# Patient Record
Sex: FEMALE
[De-identification: names, ages, dates, MRNs, and addresses within clinical notes are randomized per-mention and may not be internally consistent; named-entity substitution may affect disease eponyms.]

---

## 2018-01-30 ENCOUNTER — HOSPITAL ENCOUNTER (OUTPATIENT)
Dept: HOSPITAL 75 - RAD | Age: 58
End: 2018-01-30
Attending: NURSE PRACTITIONER
Payer: COMMERCIAL

## 2018-01-30 DIAGNOSIS — I73.9: Primary | ICD-10-CM

## 2018-01-30 PROCEDURE — 93922 UPR/L XTREMITY ART 2 LEVELS: CPT

## 2018-01-30 NOTE — DIAGNOSTIC IMAGING REPORT
INDICATION: Peripheral vascular disease.



FINDINGS: The right ankle-brachial index was 1.08. Left

ankle-brachial index was 1.18.



IMPRESSION: Normal bilateral ankle-brachial indices.



Dictated by: 



  Dictated on workstation # [id number]

## 2019-01-10 ENCOUNTER — HOSPITAL ENCOUNTER (EMERGENCY)
Dept: HOSPITAL 75 - ER | Age: 59
Discharge: HOME | End: 2019-01-10
Payer: COMMERCIAL

## 2019-01-10 VITALS — DIASTOLIC BLOOD PRESSURE: 97 MMHG | SYSTOLIC BLOOD PRESSURE: 151 MMHG

## 2019-01-10 VITALS — WEIGHT: 159 LBS | BODY MASS INDEX: 28.17 KG/M2 | HEIGHT: 63 IN

## 2019-01-10 DIAGNOSIS — N39.0: ICD-10-CM

## 2019-01-10 DIAGNOSIS — I10: ICD-10-CM

## 2019-01-10 DIAGNOSIS — L03.116: ICD-10-CM

## 2019-01-10 DIAGNOSIS — I73.9: ICD-10-CM

## 2019-01-10 DIAGNOSIS — Z98.890: ICD-10-CM

## 2019-01-10 DIAGNOSIS — F17.210: ICD-10-CM

## 2019-01-10 DIAGNOSIS — L03.115: Primary | ICD-10-CM

## 2019-01-10 DIAGNOSIS — M06.9: ICD-10-CM

## 2019-01-10 DIAGNOSIS — F41.9: ICD-10-CM

## 2019-01-10 DIAGNOSIS — F19.10: ICD-10-CM

## 2019-01-10 DIAGNOSIS — Z88.5: ICD-10-CM

## 2019-01-10 LAB
ALBUMIN SERPL-MCNC: 3.7 GM/DL (ref 3.2–4.5)
ALP SERPL-CCNC: 151 U/L (ref 40–136)
ALT SERPL-CCNC: 43 U/L (ref 0–55)
APTT BLD: 24 SEC (ref 24–35)
APTT PPP: (no result) S
BACTERIA #/AREA URNS HPF: (no result) /HPF
BARBITURATES UR QL: NEGATIVE
BASOPHILS # BLD AUTO: 0 10^3/UL (ref 0–0.1)
BASOPHILS NFR BLD AUTO: 0 % (ref 0–10)
BENZODIAZ UR QL SCN: NEGATIVE
BILIRUB SERPL-MCNC: 0.7 MG/DL (ref 0.1–1)
BILIRUB UR QL STRIP: NEGATIVE
BUN/CREAT SERPL: 14
CALCIUM SERPL-MCNC: 9.2 MG/DL (ref 8.5–10.1)
CAOX CRY #/AREA URNS LPF: (no result) /LPF
CHLORIDE SERPL-SCNC: 107 MMOL/L (ref 98–107)
CO2 SERPL-SCNC: 21 MMOL/L (ref 21–32)
COCAINE UR QL: NEGATIVE
CREAT SERPL-MCNC: 0.73 MG/DL (ref 0.6–1.3)
EOSINOPHIL # BLD AUTO: 0.1 10^3/UL (ref 0–0.3)
EOSINOPHIL NFR BLD AUTO: 2 % (ref 0–10)
ERYTHROCYTE [DISTWIDTH] IN BLOOD BY AUTOMATED COUNT: 13.3 % (ref 10–14.5)
ERYTHROCYTE [SEDIMENTATION RATE] IN BLOOD: 13 MM/HR (ref 0–30)
FIBRINOGEN PPP-MCNC: (no result) MG/DL
GFR SERPLBLD BASED ON 1.73 SQ M-ARVRAT: > 60 ML/MIN
GLUCOSE SERPL-MCNC: 139 MG/DL (ref 70–105)
GLUCOSE UR STRIP-MCNC: NEGATIVE MG/DL
HCT VFR BLD CALC: 38 % (ref 35–52)
HGB BLD-MCNC: 13.1 G/DL (ref 11.5–16)
INR PPP: 1 (ref 0.8–1.4)
KETONES UR QL STRIP: NEGATIVE
LEUKOCYTE ESTERASE UR QL STRIP: (no result)
LYMPHOCYTES # BLD AUTO: 0.8 X 10^3 (ref 1–4)
LYMPHOCYTES NFR BLD AUTO: 23 % (ref 12–44)
MAGNESIUM SERPL-MCNC: 1.7 MG/DL (ref 1.8–2.4)
MANUAL DIFFERENTIAL PERFORMED BLD QL: NO
MCH RBC QN AUTO: 31 PG (ref 25–34)
MCHC RBC AUTO-ENTMCNC: 35 G/DL (ref 32–36)
MCV RBC AUTO: 89 FL (ref 80–99)
METHADONE UR QL SCN: NEGATIVE
METHAMPHETAMINE SCREEN URINE S: NEGATIVE
MONOCYTES # BLD AUTO: 0.2 X 10^3 (ref 0–1)
MONOCYTES NFR BLD AUTO: 6 % (ref 0–12)
NEUTROPHILS # BLD AUTO: 2.3 X 10^3 (ref 1.8–7.8)
NEUTROPHILS NFR BLD AUTO: 70 % (ref 42–75)
NITRITE UR QL STRIP: NEGATIVE
OPIATES UR QL SCN: NEGATIVE
OXYCODONE UR QL: NEGATIVE
PH UR STRIP: 6 [PH] (ref 5–9)
PLATELET # BLD: 144 10^3/UL (ref 130–400)
PMV BLD AUTO: 9.8 FL (ref 7.4–10.4)
POTASSIUM SERPL-SCNC: 3.8 MMOL/L (ref 3.6–5)
PROPOXYPH UR QL: NEGATIVE
PROT SERPL-MCNC: 6.7 GM/DL (ref 6.4–8.2)
PROT UR QL STRIP: (no result)
PROTHROMBIN TIME: 13.5 SEC (ref 12.2–14.7)
RBC # BLD AUTO: 4.23 10^6/UL (ref 4.35–5.85)
RBC #/AREA URNS HPF: (no result) /HPF
SODIUM SERPL-SCNC: 140 MMOL/L (ref 135–145)
SP GR UR STRIP: 1.02 (ref 1.02–1.02)
SQUAMOUS #/AREA URNS HPF: (no result) /HPF
TRICYCLICS UR QL SCN: NEGATIVE
TSH SERPL DL<=0.05 MIU/L-ACNC: 1.29 UIU/ML (ref 0.35–4.94)
UROBILINOGEN UR-MCNC: 4 MG/DL
WBC # BLD AUTO: 3.3 10^3/UL (ref 4.3–11)
WBC #/AREA URNS HPF: (no result) /HPF

## 2019-01-10 PROCEDURE — 80320 DRUG SCREEN QUANTALCOHOLS: CPT

## 2019-01-10 PROCEDURE — 84443 ASSAY THYROID STIM HORMONE: CPT

## 2019-01-10 PROCEDURE — 80053 COMPREHEN METABOLIC PANEL: CPT

## 2019-01-10 PROCEDURE — 93005 ELECTROCARDIOGRAM TRACING: CPT

## 2019-01-10 PROCEDURE — 85610 PROTHROMBIN TIME: CPT

## 2019-01-10 PROCEDURE — 36415 COLL VENOUS BLD VENIPUNCTURE: CPT

## 2019-01-10 PROCEDURE — 85025 COMPLETE CBC W/AUTO DIFF WBC: CPT

## 2019-01-10 PROCEDURE — 84484 ASSAY OF TROPONIN QUANT: CPT

## 2019-01-10 PROCEDURE — 85652 RBC SED RATE AUTOMATED: CPT

## 2019-01-10 PROCEDURE — 83735 ASSAY OF MAGNESIUM: CPT

## 2019-01-10 PROCEDURE — 86141 C-REACTIVE PROTEIN HS: CPT

## 2019-01-10 PROCEDURE — 80306 DRUG TEST PRSMV INSTRMNT: CPT

## 2019-01-10 PROCEDURE — 83880 ASSAY OF NATRIURETIC PEPTIDE: CPT

## 2019-01-10 PROCEDURE — 71046 X-RAY EXAM CHEST 2 VIEWS: CPT

## 2019-01-10 PROCEDURE — 87088 URINE BACTERIA CULTURE: CPT

## 2019-01-10 PROCEDURE — 96372 THER/PROPH/DIAG INJ SC/IM: CPT

## 2019-01-10 PROCEDURE — 93041 RHYTHM ECG TRACING: CPT

## 2019-01-10 PROCEDURE — 81000 URINALYSIS NONAUTO W/SCOPE: CPT

## 2019-01-10 PROCEDURE — 85730 THROMBOPLASTIN TIME PARTIAL: CPT
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
## 2019-01-10 NOTE — XMS REPORT
Continuity of Care Document

 Created on: 01/10/2019



Cindy Negrete

External Reference #: [id number]

: 1960

Sex: Female



Demographics







 Preferred Language  Unknown

 

 Marital Status  Unknown

 

 Confucianist Affiliation  Unknown

 

 Race  Unknown

 

 Ethnic Group  Unknown





Author







 Author  [facility]

 

 Organization  [facility]

 

 Address  Unknown

 

 Phone  Unavailable



              



Allergies

      





 Active            Description            Code            Type            
Severity            Reaction            Onset            Reported/Identified   
         Relationship to Patient            Clinical Status        

 

 Yes            amphetamine                         Drug Allergy            N/A
            N/A                         2014                             
     

 

 Yes            Benzodiazepines                         Drug Allergy            
N/A            N/A                         2014                          
        

 

 Yes            hydrocodone                         Drug Allergy            N/A
            N/A                         2014                             
     



                      



Medications

      



There is no data.                  



Problems

      





 Date Dx Coded            Attending            Type            Code            
Diagnosis            Diagnosed By        

 

 2012            GALEN THOMPSON DO                         070.70         
   UNSPECIFIED VIRAL HEPATITIS C WITHOUT HEPATIC COMA                     

 

 2012            GALEN THOMPSON DO                         078.10         
   VIRAL WARTS UNSPECIFIED                     

 

 2012            GALEN THOMPSON DO                         401.1          
  HYPERTENSION, BENIGN ESSENTIAL                     

 

 2012            GALEN THOMPSON DO                         716.90         
   ARTHRITIS/ ARTHROPATHY, UNSPECIFIED                     

 

 2012            GALEN THOMPSON DO                         V70.0          
  ROUTINE GENERAL MEDICAL EXAMINATION AT A HEALTH CARE FACILITY                
     

 

 2012            GALEN THOMPSON DO                         V76.10         
   BREAST CANCER SCREENING                     

 

 2012            GALEN THOMPSON DO                         V76.2          
  CERVICAL CANCER SCREENING (PAP SMEAR)                     

 

 2012            RACHAEL DOAN PA-C                         070.70    
        UNSPECIFIED VIRAL HEPATITIS C WITHOUT HEPATIC COMA                     

 

 2012            RACHAEL DOAN PA-C                         078.10    
        VIRAL WARTS UNSPECIFIED                     

 

 2012            RACHAEL DOAN PA-C                         401.1     
       HYPERTENSION, BENIGN ESSENTIAL                     

 

 2012            RACHAEL DOAN PA-C                         716.90    
        ARTHRITIS/ ARTHROPATHY, UNSPECIFIED                     

 

 2012            RACHAEL DOAN PA-C                         V70.0     
       ROUTINE GENERAL MEDICAL EXAMINATION AT A HEALTH CARE FACILITY           
          

 

 2012            RACHAEL DOAN PA-C                         V76.10    
        BREAST CANCER SCREENING                     

 

 2012            RACHAEL DOAN PA-C                         V76.2     
       CERVICAL CANCER SCREENING (PAP SMEAR)                     

 

 2012                                      070.70            HEPATITIS, C 
VIRUS                     

 

 2012                                      078.10            VIRAL WARTS 
UNSPECIFIED                     

 

 2012                                      401.1            ESSENTIAL 
HYPERTENSION BENIGN                     

 

 2012                                      716.90            ARTHRITIS/ 
ARTHROPATHY, UNSPECIFIED                     

 

 2012                                      V70.0            ROUTINE 
GENERAL MEDICAL EXAMINATION AT A HEALTH CARE FACILITY                     

 

 2012                                      V76.10            BREAST 
CANCER SCREENING                     

 

 2012                                      V76.2            CERVICAL 
CANCER SCREENING (PAP SMEAR)                     

 

 2012                                      070.70            HEPATITIS, C 
VIRUS                     

 

 2012                                      078.10            VIRAL WARTS 
UNSPECIFIED                     

 

 2012                                      401.1            ESSENTIAL 
HYPERTENSION BENIGN                     

 

 2012                                      716.90            ARTHRITIS/ 
ARTHROPATHY, UNSPECIFIED                     

 

 2012                                      V70.0            ROUTINE 
GENERAL MEDICAL EXAMINATION AT A HEALTH CARE FACILITY                     

 

 2012                                      V76.10            BREAST 
CANCER SCREENING                     

 

 2012                                      V76.2            CERVICAL 
CANCER SCREENING (PAP SMEAR)                     

 

 2012                                      070.70            HEPATITIS, C 
VIRUS                     

 

 2012                                      078.10            VIRAL WARTS 
UNSPECIFIED                     

 

 2012                                      401.1            ESSENTIAL 
HYPERTENSION BENIGN                     

 

 2012                                      716.90            ARTHRITIS/ 
ARTHROPATHY, UNSPECIFIED                     

 

 2012                                      V70.0            ROUTINE 
GENERAL MEDICAL EXAMINATION AT A HEALTH CARE FACILITY                     

 

 2012                                      V76.10            BREAST 
CANCER SCREENING                     

 

 2012                                      V76.2            CERVICAL 
CANCER SCREENING (PAP SMEAR)                     

 

 2012                                      070.70            HEPATITIS, C 
VIRUS                     

 

 2012                                      078.10            VIRAL WARTS 
UNSPECIFIED                     

 

 2012                                      401.1            ESSENTIAL 
HYPERTENSION BENIGN                     

 

 2012                                      716.90            ARTHRITIS/ 
ARTHROPATHY, UNSPECIFIED                     

 

 2012                                      V70.0            ROUTINE 
GENERAL MEDICAL EXAMINATION AT A HEALTH CARE FACILITY                     

 

 2012                                      V76.10            BREAST 
CANCER SCREENING                     

 

 2012                                      V76.2            CERVICAL 
CANCER SCREENING (PAP SMEAR)                     

 

 2012                                      070.70            HEPATITIS, C 
VIRUS                     

 

 2012                                      078.10            VIRAL WARTS 
UNSPECIFIED                     

 

 2012                                      401.1            ESSENTIAL 
HYPERTENSION BENIGN                     

 

 2012                                      716.90            ARTHRITIS/ 
ARTHROPATHY, UNSPECIFIED                     

 

 2012                                      V70.0            ROUTINE 
GENERAL MEDICAL EXAMINATION AT A HEALTH CARE FACILITY                     

 

 2012                                      V76.10            BREAST 
CANCER SCREENING                     

 

 2012                                      V76.2            CERVICAL 
CANCER SCREENING (PAP SMEAR)                     

 

 2012                                      070.70            HEPATITIS, C 
VIRUS                     

 

 2012                                      078.10            VIRAL WARTS 
UNSPECIFIED                     

 

 2012                                      401.1            ESSENTIAL 
HYPERTENSION BENIGN                     

 

 2012                                      716.90            ARTHRITIS/ 
ARTHROPATHY, UNSPECIFIED                     

 

 2012                                      V70.0            ROUTINE 
GENERAL MEDICAL EXAMINATION AT A HEALTH CARE FACILITY                     

 

 2012                                      V76.10            BREAST 
CANCER SCREENING                     

 

 2012                                      V76.2            CERVICAL 
CANCER SCREENING (PAP SMEAR)                     

 

 2012                                      070.70            HEPATITIS, C 
VIRUS                     

 

 2012                                      078.10            VIRAL WARTS 
UNSPECIFIED                     

 

 2012                                      401.1            ESSENTIAL 
HYPERTENSION BENIGN                     

 

 2012                                      716.90            ARTHRITIS/ 
ARTHROPATHY, UNSPECIFIED                     

 

 2012                                      V70.0            ROUTINE 
GENERAL MEDICAL EXAMINATION AT A HEALTH CARE FACILITY                     

 

 2012                                      V76.10            BREAST 
CANCER SCREENING                     

 

 2012                                      V76.2            CERVICAL 
CANCER SCREENING (PAP SMEAR)                     

 

 2012                                      070.70            HEPATITIS, C 
VIRUS                     

 

 2012                                      078.10            VIRAL WARTS 
UNSPECIFIED                     

 

 2012                                      401.1            ESSENTIAL 
HYPERTENSION BENIGN                     

 

 2012                                      716.90            ARTHRITIS/ 
ARTHROPATHY, UNSPECIFIED                     

 

 2012                                      V70.0            ROUTINE 
GENERAL MEDICAL EXAMINATION AT A HEALTH CARE FACILITY                     

 

 2012                                      V76.10            BREAST 
CANCER SCREENING                     

 

 2012                                      V76.2            CERVICAL 
CANCER SCREENING (PAP SMEAR)                     

 

 2012            CARLINE DDS, MICHAEL ROMAN                         070.70    
        HEPATITIS, C VIRUS                     

 

 2012            CARLINE DDS, MICHAEL ROMAN                         078.10    
        VIRAL WARTS UNSPECIFIED                     

 

 2012            CARLINE DDS, MICHAEL ROMAN                         401.1     
       ESSENTIAL HYPERTENSION BENIGN                     

 

 2012            CARLINE DDS, MICHAEL ROMAN                         716.90    
        ARTHRITIS/ ARTHROPATHY, UNSPECIFIED                     

 

 2012            CARLINE DDS, MICHAEL ROMAN                         V70.0     
       ROUTINE GENERAL MEDICAL EXAMINATION AT A HEALTH CARE FACILITY           
          

 

 2012            CARLINE DDS, MICHAEL ROMAN                         V76.10    
        BREAST CANCER SCREENING                     

 

 2012            CARLINE DDS, MICHAEL ROMAN                         V76.2     
       CERVICAL CANCER SCREENING (PAP SMEAR)                     

 

 2012            CARLINE DDS, MICHAEL ROMAN                         070.70    
        HEPATITIS, C VIRUS                     

 

 2012            CARLINE DDS, MICHAEL ROMAN                         078.10    
        VIRAL WARTS UNSPECIFIED                     

 

 2012            CARLINE DDS, MICHAEL ROMAN                         401.1     
       ESSENTIAL HYPERTENSION BENIGN                     

 

 2012            CARLINE DDS, MICHAEL ROMAN                         716.90    
        ARTHRITIS/ ARTHROPATHY, UNSPECIFIED                     

 

 2012            MICHAEL CROWLEY DDS                         V70.0     
       ROUTINE GENERAL MEDICAL EXAMINATION AT A HEALTH CARE FACILITY           
          

 

 2012            MICHAEL CROWLEY DDS                         V76.10    
        BREAST CANCER SCREENING                     

 

 2012            MICHAEL CROWLEY DDS                         V76.2     
       CERVICAL CANCER SCREENING (PAP SMEAR)                     

 

 2012            GALEN THOMPSON DO                         070.70         
   HEPATITIS, C VIRUS                     

 

 2012            GALEN THOMPSON DO K                         078.10         
   VIRAL WARTS UNSPECIFIED                     

 

 2012            GALEN THOMPSON DO K                         401.1          
  ESSENTIAL HYPERTENSION BENIGN                     

 

 2012            GEENA THOMPSON DOA K                         716.90         
   ARTHRITIS/ ARTHROPATHY, UNSPECIFIED                     

 

 2012            GEENA THOMPSON DOA K                         V70.0          
  ROUTINE GENERAL MEDICAL EXAMINATION AT A HEALTH CARE FACILITY                
     

 

 2012            GALEN THOMPSON DO                         V76.10         
   BREAST CANCER SCREENING                     

 

 2012            GEENA THOMPSON DOA K                         V76.2          
  CERVICAL CANCER SCREENING (PAP SMEAR)                     

 

 2012            GALEN THOMPSON DO K                         070.70         
   HEPATITIS, C VIRUS                     

 

 2012            GALEN THOMPSON DO K                         078.10         
   VIRAL WARTS UNSPECIFIED                     

 

 2012            GEENA THOMPSON DOA K                         401.1          
  ESSENTIAL HYPERTENSION BENIGN                     

 

 2012            GEENA THOMPSON DOA K                         716.90         
   ARTHRITIS/ ARTHROPATHY, UNSPECIFIED                     

 

 2012            GEEAN THOMPSON DOA K                         V70.0          
  ROUTINE GENERAL MEDICAL EXAMINATION AT A HEALTH CARE FACILITY                
     

 

 2012            GEENA THOMPSON DOA K                         V76.10         
   BREAST CANCER SCREENING                     

 

 2012            GEENA THOMPSON DOA K                         V76.2          
  CERVICAL CANCER SCREENING (PAP SMEAR)                     

 

 2012            WHITE DDS, MAYURI J                         070.70     
       HEPATITIS, C VIRUS                     

 

 2012            WHITE DDS, MAYURI J                         078.10     
       VIRAL WARTS UNSPECIFIED                     

 

 2012            WHITE DDS, MAYURI J                         401.1      
      ESSENTIAL HYPERTENSION BENIGN                     

 

 2012            WHITE DDS, MAYURI J                         716.90     
       ARTHRITIS/ ARTHROPATHY, UNSPECIFIED                     

 

 2012            WHITE DDS, MAYURI J                         V70.0      
      ROUTINE GENERAL MEDICAL EXAMINATION AT A HEALTH CARE FACILITY            
         

 

 2012            WHITE DDS, MAYURI J                         V76.10     
       BREAST CANCER SCREENING                     

 

 2012            WHITE DDS, MAYURI J                         V76.2      
      CERVICAL CANCER SCREENING (PAP SMEAR)                     

 

 2012            THOMPSON DO, GALEN K                         070.70         
   HEPATITIS, C VIRUS                     

 

 2012            GALEN THOMPSON DO                         078.10         
   VIRAL WARTS UNSPECIFIED                     

 

 2012            GALEN THOMPSON DO                         401.1          
  ESSENTIAL HYPERTENSION BENIGN                     

 

 2012            GALEN THOMPSON DO                         716.90         
   ARTHRITIS/ ARTHROPATHY, UNSPECIFIED                     

 

 2012            GALEN THOMPSON DO                         V70.0          
  ROUTINE GENERAL MEDICAL EXAMINATION AT A HEALTH CARE FACILITY                
     

 

 2012            GALEN THOMPSON DO                         V76.10         
   BREAST CANCER SCREENING                     

 

 2012            GALEN THOMPSON DO                         V76.2          
  CERVICAL CANCER SCREENING (PAP SMEAR)                     

 

 2012            GALEN THOMPSON DO                         070.70         
   UNSPECIFIED VIRAL HEPATITIS C WITHOUT HEPATIC COMA                     

 

 2012            GALEN THOMPSON DO                         078.10         
   VIRAL WARTS UNSPECIFIED                     

 

 2012            GALEN THOMPSON DO                         401.1          
  HYPERTENSION, BENIGN ESSENTIAL                     

 

 2012            GALEN TOHMPSON DO                         716.90         
   ARTHRITIS/ ARTHROPATHY, UNSPECIFIED                     

 

 2012            GALEN THOMPSON DO                         V70.0          
  ROUTINE GENERAL MEDICAL EXAMINATION AT A HEALTH CARE FACILITY                
     

 

 2012            GALEN THOMPSON DO                         V76.10         
   BREAST CANCER SCREENING                     

 

 2012            GALEN THOMPSON DO                         V76.2          
  CERVICAL CANCER SCREENING (PAP SMEAR)                     

 

 10/22/2012            GALEN THOMPSON DO                         443.0          
  RAYNAUD'S SYNDROME                     

 

 10/22/2012            GALEN THOMPSON DO                         786.50         
   CHEST PAIN                     

 

 10/22/2012            RACHAEL DOAN PA-C                         443.0     
       RAYNAUD'S SYNDROME                     

 

 10/22/2012            RACHAEL DOAN PA-C                         786.50    
        CHEST PAIN                     

 

 10/22/2012                                      443.0            RAYNAUD'S 
SYNDROME                     

 

 10/22/2012                                      786.50            CHEST PAIN  
                   

 

 10/22/2012                                      443.0            RAYNAUD'S 
SYNDROME                     

 

 10/22/2012                                      786.50            CHEST PAIN  
                   

 

 10/22/2012                                      443.0            RAYNAUD'S 
SYNDROME                     

 

 10/22/2012                                      786.50            CHEST PAIN  
                   

 

 10/22/2012                                      443.0            RAYNAUD'S 
SYNDROME                     

 

 10/22/2012                                      786.50            CHEST PAIN  
                   

 

 10/22/2012                                      443.0            RAYNAUD'S 
SYNDROME                     

 

 10/22/2012                                      786.50            CHEST PAIN  
                   

 

 10/22/2012                                      443.0            RAYNAUD'S 
SYNDROME                     

 

 10/22/2012                                      786.50            CHEST PAIN  
                   

 

 10/22/2012                                      443.0            RAYNAUD'S 
SYNDROME                     

 

 10/22/2012                                      786.50            CHEST PAIN  
                   

 

 10/22/2012                                      443.0            RAYNAUD'S 
SYNDROME                     

 

 10/22/2012                                      786.50            CHEST PAIN  
                   

 

 10/22/2012            CARLINE DDS, MICHAEL ROMAN                         443.0     
       RAYNAUD'S SYNDROME                     

 

 10/22/2012            CARLINE DDS, MICHAEL ROMAN                         786.50    
        CHEST PAIN                     

 

 10/22/2012            CARLINE DDS, MICHAEL ROMAN                         443.0     
       RAYNAUD'S SYNDROME                     

 

 10/22/2012            CARLINE DDS, MICHAEL ROMAN                         786.50    
        CHEST PAIN                     

 

 10/22/2012            THOMPSON DO, GALEN K                         443.0          
  RAYNAUD'S SYNDROME                     

 

 10/22/2012            THOMPSON DO, GALEN K                         786.50         
   CHEST PAIN                     

 

 10/22/2012            THOMPSON DO, GALEN K                         443.0          
  RAYNAUD'S SYNDROME                     

 

 10/22/2012            THOMPSON DO, GALEN K                         786.50         
   CHEST PAIN                     

 

 10/22/2012            WHITE DDS, MAYURI J                         443.0      
      RAYNAUD'S SYNDROME                     

 

 10/22/2012            WHITE DDS, MAYURI J                         786.50     
       CHEST PAIN                     

 

 10/22/2012            THOMPSON DO, GALEN K                         443.0          
  RAYNAUD'S SYNDROME                     

 

 10/22/2012            THOMPSON DO, GALEN K                         786.50         
   CHEST PAIN                     

 

 10/22/2012            THOMPSON DO, GALEN K                         443.0          
  RAYNAUD'S SYNDROME                     

 

 10/22/2012            THOMPSON DO, GALEN K                         786.50         
   CHEST PAIN                     

 

 2012            THOMPSON DO, GALEN K                         714.0          
  RHEUMATOID ARTHRITIS                     

 

 2012            RACHAEL DOAN PA-C                         714.0     
       RHEUMATOID ARTHRITIS                     

 

 2012                                      714.0            RHEUMATOID 
ARTHRITIS                     

 

 2012                                      714.0            RHEUMATOID 
ARTHRITIS                     

 

 2012                                      714.0            RHEUMATOID 
ARTHRITIS                     

 

 2012                                      714.0            RHEUMATOID 
ARTHRITIS                     

 

 2012                                      714.0            RHEUMATOID 
ARTHRITIS                     

 

 2012                                      714.0            RHEUMATOID 
ARTHRITIS                     

 

 2012                                      714.0            RHEUMATOID 
ARTHRITIS                     

 

 2012                                      714.0            RHEUMATOID 
ARTHRITIS                     

 

 2012            CARLINE DDS, MICHAEL ROMAN                         714.0     
       RHEUMATOID ARTHRITIS                     

 

 2012            CARLINE HYLTONS, MICHAEL ROMAN                         714.0     
       RHEUMATOID ARTHRITIS                     

 

 2012            THOMPSON DO GALEN K                         714.0          
  RHEUMATOID ARTHRITIS                     

 

 2012            THOMPSON  GALEN YOON                         714.0          
  RHEUMATOID ARTHRITIS                     

 

 2012            WHITE CONCETTAS, MAYURI J                         714.0      
      RHEUMATOID ARTHRITIS                     

 

 2012            JAY HUTCHISON GALEN K                         714.0          
  RHEUMATOID ARTHRITIS                     

 

 2012            THOMPSON DO GALEN K                         714.0          
  RHEUMATOID ARTHRITIS                     

 

 2013            RACHAEL DOAN PA-C                         795.51    
        POSITIVE TB SKIN TEST                     

 

 2013                                      795.51            POSITIVE TB 
SKIN TEST                     

 

 2013                                      795.51            POSITIVE TB 
SKIN TEST                     

 

 2013                                      795.51            POSITIVE TB 
SKIN TEST                     

 

 2013                                      795.51            POSITIVE TB 
SKIN TEST                     

 

 2013                                      795.51            POSITIVE TB 
SKIN TEST                     

 

 2013                                      795.51            POSITIVE TB 
SKIN TEST                     

 

 2013                                      795.51            POSITIVE TB 
SKIN TEST                     

 

 2013                                      795.51            POSITIVE TB 
SKIN TEST                     

 

 2013            CARLINE CRUZ, MICHAEL ROMAN                         795.51    
        POSITIVE TB SKIN TEST                     

 

 2013            CARLINE HYLTONS, MICHAEL ROMAN                         795.51    
        POSITIVE TB SKIN TEST                     

 

 2013            GALEN THOMPSON DO                         795.51         
   POSITIVE TB SKIN TEST                     

 

 2013            THOMPSON GALEN HUTCHISON                         795.51         
   POSITIVE TB SKIN TEST                     

 

 2013            SHAHRAM HYLTONS, MAYURI BARRERA                         795.51     
       POSITIVE TB SKIN TEST                     

 

 2013            GALEN THOMPSON DO                         795.51         
   POSITIVE TB SKIN TEST                     

 

 2013                                      V05.3            [place] DX   
                  

 

 2013                                      V05.3            [place] DX   
                  

 

 2013                                      V05.3            [place] DX   
                  

 

 2013                                      V05.3            [place] DX   
                  

 

 2013                                      V05.3            [place] DX   
                  

 

 2013                                      V05.3            [place] DX   
                  

 

 2013                                      V05.3            [place] DX   
                  

 

 2013                                      V05.3            [place] DX   
                  

 

 2013            CARLINE CRUZ, MICHAEL ROMAN                         V05.3     
       [place] DX                     

 

 2013            CARLINE CRUZ, MICHAEL ROMAN                         V05.3     
       [place] DX                     

 

 2013            GALEN THOMPSON DO                         V05.3          
  [place] DX                     

 

 2013            GALEN THOMPSON DO                         V05.3          
  [place] DX                     

 

 2013            MAYURI OMALLEY DDS                         V05.3      
      [place] DX                     

 

 2013            GALEN THOMPSON DO                         V05.3          
  [place] DX                     

 

 2013                                      309.28            AD ADJ D/O W 
ANX DEP MOOD                     

 

 2013                                      309.28            AD ADJ D/O W 
ANX DEP MOOD                     

 

 2013                                      309.28            AD ADJ D/O W 
ANX DEP MOOD                     

 

 2013                                      309.28            AD ADJ D/O W 
ANX DEP MOOD                     

 

 2013                                      309.28            AD ADJ D/O W 
ANX DEP MOOD                     

 

 2013                                      309.28            AD ADJ D/O W 
ANX DEP MOOD                     

 

 2013                                      309.28            AD ADJ D/O W 
ANX DEP MOOD                     

 

 2013            MICHAEL CROWLEY DDS                         309.28    
        AD ADJ D/O W ANX DEP MOOD                     

 

 2013            MICHAEL CROWLEY DDS                         309.28    
        AD ADJ D/O W ANX DEP MOOD                     

 

 2013            GALEN THOMPSON DO                         309.28         
   AD ADJ D/O W ANX DEP MOOD                     

 

 2013            GALEN THOMPSON DO                         309.28         
   AD ADJ D/O W ANX DEP MOOD                     

 

 2013            MAYURI OMALLEY DDS                         309.28     
       AD ADJ D/O W ANX DEP MOOD                     

 

 2013            GALEN THOMPSON DO                         309.28         
   AD ADJ D/O W ANX DEP MOOD                     

 

 2013            MICHAEL CROWLEY DDS                         E870.3    
        ACCIDENT - CUT/PUNCTURE/PERF DRUING INJECTION                     

 

 2013            MICHAEL CROWLEY DDS                         E870.3    
        ACCIDENT - CUT/PUNCTURE/PERF DRUING INJECTION                     

 

 2013            GALEN THOMPSON DO                         E870.3         
   ACCIDENT - CUT/PUNCTURE/PERF DRUING INJECTION                     

 

 2013            GALNE THOMPSON DO                         E870.3         
   ACCIDENT - CUT/PUNCTURE/PERF DRUING INJECTION                     

 

 2013            MAYURI OMALLEY DDS                         E870.3     
       ACCIDENT - CUT/PUNCTURE/PERF DRUING INJECTION                     

 

 2013            GALEN THOMPSON DO                         E870.3         
   ACCIDENT - CUT/PUNCTURE/PERF DRUING INJECTION                     

 

 2014            GALEN THOMPSON DO                         625.8          
  OTHER SPECIFIED SYMPTOMS ASSOCIATED WITH FEMALE GENITAL ORGANS               
      

 

 2014            GALEN THOMPSON DO                         625.8          
  OTHER SPECIFIED SYMPTOMS ASSOCIATED WITH FEMALE GENITAL ORGANS               
      

 

 2014            MAYURI OMALLEY DDS                         625.8      
      OTHER SPECIFIED SYMPTOMS ASSOCIATED WITH FEMALE GENITAL ORGANS           
          

 

 2014            GALEN THOMPSON DO                         625.8          
  OTHER SPECIFIED SYMPTOMS ASSOCIATED WITH FEMALE GENITAL ORGANS               
      

 

 2014            GALEN THOMPSON DO                         305.70         
   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 
USE                     

 

 2014            GALEN THOMPSON DO                         719.40         
   PAIN IN JOINT SITE UNSPECIFIED                     

 

 2014            WHITE DDSMAYURI                         305.70     
       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 
UNSPECIFIED USE                     

 

 2014            WHITE DDSMAYURI                         719.40     
       PAIN IN JOINT SITE UNSPECIFIED                     

 

 2014            GALEN THOMPSON DO K                         305.70         
   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 
USE                     

 

 2014            GALEN THOMPSON DO                         719.40         
   PAIN IN JOINT SITE UNSPECIFIED                     

 

 2014            GALEN THOMPSON DO                         456.8          
  VARICES OF OTHER SITES                     

 

 2014            GALEN THOMPSON DO                         708.9          
  UNSPECIFIED URTICARIA                     

 

 2018            RACHAEL FITZGERALD            Ot            V76.12     
       OTH SCREEN MAMMO-MALIGN NEOPLASM OF TRISTAN                     

 

 2018            MADARON CLARISSA ARON ARNP            Ot            I73.9      
      PERIPHERAL VASCULAR DISEASE, UNSPECIFIED                     

 

 2018            JAY DOBSONA ARON ARNP            Ot            I73.9      
      PERIPHERAL VASCULAR DISEASE, UNSPECIFIED                     

 

 2018            RACHAEL FITZGERALD            Ot            V76.12     
       OTH SCREEN MAMMO-MALIGN NEOPLASM OF TRISTAN                     

 

 2018            JAY DOBSONA ARON ARNP            Ot            I73.9      
      PERIPHERAL VASCULAR DISEASE, UNSPECIFIED                     

 

 2018            RACHAEL FITZGERALD            Ot            V76.12     
       OTH SCREEN MAMMO-MALIGN NEOPLASM OF TRISTAN                     

 

 2018            JAY DOBSONA ARON ARNP            Ot            I73.9      
      PERIPHERAL VASCULAR DISEASE, UNSPECIFIED                     



                                                                               
                                                                               
                                                                               
                                                                               
                                                                               
                                                                               
                



Procedures

      





 Code            Description            Performed By            Performed On   
     

 

             [id number]                                  ROUTINE VENIPUNCTURE       
                            2012        

 

             33315                                  EKG, TRACING               
                    2012        

 

             36081                                  CMP                        
           2012        

 

             50410                                  LIVER PANEL (LFT)          
                         2012        

 

             23194                                  URINE DRUG SCREEN  (IN-HOUSE
)                                   2012        

 

             03517                                  BNP                        
           2012        

 

             75851                                  CBC                        
           2012        

 

             09037                                  PT/INR                     
              2012        

 

             22985                                  CRP HS (CARDIO)            
                       2012        

 

             90371                                  RA FACTOR                  
                 2012        

 

             57930                                  [facility]              
                     2012        

 

             77316                                  HEP B CORE ANTIBODY, IGM   
                                2012        

 

             39679                                  HEP A ANTIBODY, IGM (RML)  
                                 2012        

 

             14622                                  HEP B SURFACE ANTIGEN (STATE
)                                   2012        

 

             74711                                  HEP C PCR QUANT W/ANGELITO     
                              2012        

 

             ANAANA                                  [facility] (SCREEN)     
                              2012        

 

             42528                                  XRAY CHEST 2 VIEW          
                         2013        

 

             Infectiou                                  [facility]          
                         2013        

 

             52391                                  ROUTINE VENIPUNCTURE       
                            2013        

 

             70347                                  MAMMOGRAM, SCREENING       
                            2013        

 

             45769                                  CBC                        
           2013        

 

             35959                                  CMP                        
           2013        

 

             2991188                                  GFR CALC (RESULT ONLY)   
                                2013        

 

             15312                                  HEP C PCR QUANT (SERIAL)   
                                2013        

 

             02137                                  PSYCH DIAG EVAL W/MED SRVCS
                                   2013        

 

             44543                                  ROUTINE VENIPUNCTURE       
                            2013        

 

             56259                                  CMP                        
           2013        

 

             2377661                                  GFR CALC (RESULT ONLY)   
                                2013        

 

             13239                                  ROUTINE VENIPUNCTURE       
                            2013        

 

             72570                                  CBC                        
           2013        

 

             14216                                  CMP                        
           2013        

 

             24478                                  ROUTINE VENIPUNCTURE       
                            2013        

 

             40452                                  CBC                        
           2013        

 

             80122                                  CMP                        
           2013        

 

             2358091                                  GFR CALC (RESULT ONLY)   
                                2013        

 

             74389                                  HEP C PCR QUANT (SERIAL)   
                                2013        

 

             82473                                  UA W/ CULTURE IF INDICATED 
                                  2014        

 

             26396                                  URINE DRUG SCREEN  (IN-HOUSE
)                                   2014        

 

             ADDICTION                                  FAMILIA, [facility]      
                             2014        

 

             29915                                  ROUTINE VENIPUNCTURE       
                            2014        

 

             12742                                  CMP                        
           2014        

 

             44118                                  HIV ANTIBODIES (RML)       
                            2014        

 

             09253                                  HEP B SURFACE ANTIGEN (RML)
                                   2014        



                                                                               
                       



Results

      





 Test            Result            Range        









 CBC With Differential/Platelet - 16 16:43         









 WBC            6.0 x10E3/uL            3.4-10.8        

 

 RBC            5.53 x10E6/uL            3.77-5.28        

 

 Hemoglobin            17.4 g/dL            11.1-15.9        

 

 Hematocrit            50.3 %            34.0-46.6        

 

 MCV            91 fL            79-97        

 

 MCH            31.5 pg            26.6-33.0        

 

 MCHC            34.6 g/dL            31.5-35.7        

 

 RDW            13.5 %            12.3-15.4        

 

 Platelets            200 x10E3/uL            150-379        

 

 Neutrophils            70 %                     

 

 Lymphs            21 %                     

 

 Monocytes            5 %                     

 

 Eos            4 %                     

 

 Basos            0 %                     

 

 Neutrophils (Absolute)            4.2 x10E3/uL            1.4-7.0        

 

 Lymphs (Absolute)            1.3 x10E3/uL            0.7-3.1        

 

 Monocytes(Absolute)            0.3 x10E3/uL            0.1-0.9        

 

 Eos (Absolute)            0.2 x10E3/uL            0.0-0.4        

 

 Baso (Absolute)            0.0 x10E3/uL            0.0-0.2        

 

 Immature Granulocytes            0 %                     

 

 Immature Grans (Abs)            0.0 x10E3/uL            0.0-0.1        









 Comp. Metabolic Panel (14) - 16 16:43         









 Glucose, Serum            157 mg/dL            65-99        

 

 BUN            14 mg/dL            6-24        

 

 Creatinine, Serum            0.71 mg/dL            0.57-1.00        

 

 eGFR If NonAfricn Am            97 mL/min/1.73                >59        

 

 eGFR If Africn Am            112 mL/min/1.73                >59        

 

 BUN/Creatinine Ratio            20             9-23        

 

 Sodium, Serum            143 mmol/L            134-144        

 

 Potassium, Serum            4.2 mmol/L            3.5-5.2        

 

 Chloride, Serum            101 mmol/L                    

 

 Carbon Dioxide, Total            23 mmol/L            18-29        

 

 Calcium, Serum            9.2 mg/dL            8.7-10.2        

 

 Protein, Total, Serum            7.2 g/dL            6.0-8.5        

 

 Albumin, Serum            4.4 g/dL            3.5-5.5        

 

 Globulin, Total            2.8 g/dL            1.5-4.5        

 

 A/G Ratio            1.6             1.1-2.5        

 

 Bilirubin, Total            0.5 mg/dL            0.0-1.2        

 

 Alkaline Phosphatase, S            84 IU/L                    

 

 AST (SGOT)            66 IU/L            0-40        

 

 ALT (SGPT)            92 IU/L            0-32        









 TSH - 16 16:43         









 TSH            0.976 uIU/mL            0.450-4.500        









 Rheumatoid Arthritis Factor - 16 16:43         









 RA Latex Turbid.            121.1 IU/mL            0.0-13.9        









 Vitamin B12 - 16 16:43         









 Vitamin B12            439 pg/mL            211-946        









 CBC+Platelet+Hem Review - 10/18/17 18:20         









 WBC            5.8 x10E3/uL            3.4-10.8        

 

 RBC            4.45 x10E6/uL            3.77-5.28        

 

 Hemoglobin            14.2 g/dL            11.1-15.9        

 

 Hematocrit            41.1 %            34.0-46.6        

 

 MCV            92 fL            79-97        

 

 MCH            31.9 pg            26.6-33.0        

 

 MCHC            34.5 g/dL            31.5-35.7        

 

 RDW            13.6 %            12.3-15.4        

 

 Platelets            155 x10E3/uL            150-379        

 

 Neutrophils            61 %            Not Estab.        

 

 Lymphs            27 %            Not Estab.        

 

 Monocytes            7 %            Not Estab.        

 

 Eos            4 %            Not Estab.        

 

 Basos            1 %            Not Estab.        

 

 Neutrophils Absolute            3.5 X10E3/uL            1.4-7.0        

 

 Lymphs (Absolute)            1.6 X10E3/uL            0.7-3.1        

 

 Monocytes(Absolute)            0.4 X10E3/uL            0.1-0.9        

 

 Eos (Absolute Value)            0.2 X10E3/uL            0.0-0.4        

 

 Baso(Absolute)            0.1 X10E3/uL            0.0-0.2        

 

 RBC Comment            Note:             Normal        

 

 Platelet Comment            Note:             Adequate        









 Comp. Metabolic Panel (14) - 10/18/17 18:20         









 Glucose, Serum            139 mg/dL            65-99        

 

 BUN            16 mg/dL            6-24        

 

 Creatinine, Serum            0.71 mg/dL            0.57-1.00        

 

 eGFR If NonAfricn Am            96 mL/min/1.73                >59        

 

 eGFR If Africn Am            111 mL/min/1.73                >59        

 

 BUN/Creatinine Ratio            23             9-23        

 

 Sodium, Serum            142 mmol/L            134-144        

 

 Potassium, Serum            3.6 mmol/L            3.5-5.2        

 

 Chloride, Serum            103 mmol/L                    

 

 Carbon Dioxide, Total            21 mmol/L            18-29        

 

 Calcium, Serum            9.5 mg/dL            8.7-10.2        

 

 Protein, Total, Serum            7.0 g/dL            6.0-8.5        

 

 Albumin, Serum            4.2 g/dL            3.5-5.5        

 

 Globulin, Total            2.8 g/dL            1.5-4.5        

 

 A/G Ratio            1.5             1.2-2.2        

 

 Bilirubin, Total            0.8 mg/dL            0.0-1.2        

 

 Alkaline Phosphatase, S            114 IU/L                    

 

 AST (SGOT)            61 IU/L            0-40        

 

 ALT (SGPT)            81 IU/L            0-32        









 Sedimentation Rate-Westergren - 10/18/17 18:20         









 Sedimentation Rate-Westergren            9 mm/hr            0-40        









 C-Reactive Protein, Quant - 10/18/17 18:20         









 C-Reactive Protein, Quant            0.9 mg/L            0.0-4.9        









 CRP - 10/18/17 18:20         









 C-Reactive Protein, Quant            0.9 mg/L            0.0-4.9        









 CMP - 10/18/17 18:20         









 Glucose, Serum            139 mg/dL            65-99        

 

 BUN            16 mg/dL            6-24        

 

 Creatinine, Serum            0.71 mg/dL            0.57-1.00        

 

 eGFR If NonAfricn Am            96 mL/min/1.73                >59        

 

 eGFR If Africn Am            111 mL/min/1.73                >59        

 

 BUN/Creatinine Ratio            23             9-23        

 

 Sodium, Serum            142 mmol/L            134-144        

 

 Potassium, Serum            3.6 mmol/L            3.5-5.2        

 

 Chloride, Serum            103 mmol/L                    

 

 Carbon Dioxide, Total            21 mmol/L            18-29        

 

 Calcium, Serum            9.5 mg/dL            8.7-10.2        

 

 Protein, Total, Serum            7.0 g/dL            6.0-8.5        

 

 Albumin, Serum            4.2 g/dL            3.5-5.5        

 

 Globulin, Total            2.8 g/dL            1.5-4.5        

 

 A/G Ratio            1.5             1.2-2.2        

 

 Bilirubin, Total            0.8 mg/dL            0.0-1.2        

 

 Alkaline Phosphatase, S            114 IU/L                    

 

 AST (SGOT)            61 IU/L            0-40        

 

 ALT (SGPT)            81 IU/L            0-32        









 CBC w/ MANUAL DIFF - 10/18/17 18:20         









 WBC            5.8 x10E3/uL            3.4-10.8        

 

 RBC            4.45 x10E6/uL            3.77-5.28        

 

 Hemoglobin            14.2 g/dL            11.1-15.9        

 

 Hematocrit            41.1 %            34.0-46.6        

 

 MCV            92 fL            79-97        

 

 MCH            31.9 pg            26.6-33.0        

 

 MCHC            34.5 g/dL            31.5-35.7        

 

 RDW            13.6 %            12.3-15.4        

 

 Platelets            155 x10E3/uL            150-379        

 

 Neutrophils            61 %            Not Estab.        

 

 Lymphs            27 %            Not Estab.        

 

 Monocytes            7 %            Not Estab.        

 

 Eos            4 %            Not Estab.        

 

 Basos            1 %            Not Estab.        

 

 Neutrophils Absolute            3.5 X10E3/uL            1.4-7.0        

 

 Lymphs (Absolute)            1.6 X10E3/uL            0.7-3.1        

 

 Monocytes(Absolute)            0.4 X10E3/uL            0.1-0.9        

 

 Eos (Absolute Value)            0.2 X10E3/uL            0.0-0.4        

 

 Baso(Absolute)            0.1 X10E3/uL            0.0-0.2        

 

 Differential Comment                         NRG        

 

 RBC Comment            Note:             Normal        

 

 Platelet Comment            Note:             Adequate        









 TSH - 17 16:40         









 TSH            0.81 mIU/L            NRG        









 PROTEIN E-PHORESIS, URINE-RANDOM - 10/04/18 14:15         









 CREATININE, RANDOM URINE            157 mg/dL                    

 

 PROTEIN/CREATININE RATIO            146 mg/g creat                    

 

 PROTEIN, TOTAL, RANDOM UR            23 mg/dL            5-24        

 

 ALBUMIN            17 %            NRG        

 

 ALPHA-1-GLOBULINS            5 %            NRG        

 

 ALPHA-2-GLOBULINS            17 %            NRG        

 

 BETA GLOBULINS            20 %            NRG        

 

 GAMMA GLOBULINS            42 %            NRG        

 

 INTERPRETATION                         NRG        



                                          



Encounters

      





 ACCT No.            Visit Date/Time            Discharge            Status    
        Pt. Type            Provider            Facility            Loc./Unit  
          Complaint        

 

 [id number]            10/19/2017 18:08:00                                   
   Document Registration                                                       
     

 

 [id number]            2016 13:05:00                                   
   Document Registration                                                       
     

 

 [id number]            2019 17:55:00            2019 23:59:59          
  CLS            Outpatient            DEVIN MCLAUGHLIN, JENARO HERNANDEZ                     
    [facility] [facility]                     

 

 [id number]            10/04/2018 11:40:00                                      
Document Registration                                                          
  

 

 [id number]            2017 15:20:00                                      
Document Registration                                                          
  

 

 [id number]            10/18/2017 17:20:00                                      
Document Registration                                                          
  

 

 [id number]            2018 16:20:00            2018 23:59:59    
        CLS            Preadmit            ZAIRE KRYSTAL MCLAUGHLIN            Via 
[facility]            CARD            R07.89 ANTERIOR CHEST 
WALL PAIN        

 

 [id number]            2018 16:19:00            2018 23:59:59    
        CLS            Preadmit            KRYSTAL TAI MD            Via 
[facility]            CARD            R07.89 ANTERIOR CHEST 
WALL PAIN        

 

 [id number]            2018 16:49:00            2018 23:59:59    
        CLS            Outpatient            CLARISSA DOBSON ARNP            Via 
[facility]            RAD            I73.9 PERIPHERAL 
VASCULAR DISEASE        

 

 [id number]            2013 10:53:00            2013 23:59:59    
        CLS            Outpatient            RACHAEL FITZGERALD            Via 
[facility]            RAD            SCREENING        

 

 [id number]            2014 10:16:00            2014 23:59:59          
  CLS            Outpatient            GALEN THOMPSON DO                        
                       

 

 [id number]            2014 10:04:00            2014 23:59:59          
  CLS            Outpatient            MAYURI OMALLEY DDS                    
                           

 

 [id number]            2014 15:06:00            2014 23:59:59          
  CLS            Outpatient            GALEN THOMPSON DO                        
                       

 

 [id number]            2014 16:00:00            2014 23:59:59          
  CLS            Outpatient            GALEN THOMPSON DO                        
                       

 

 [id number]            2013 10:54:00            2013 23:59:59          
  CLS            Outpatient            MICHAEL CROWLEY DDS                   
                            

 

 [id number]            2013 08:55:00            2013 23:59:59          
  CLS            Outpatient            CARLINE MICHAEL CRUZ                   
                            

 

 [id number]            2013 15:55:00            2013 23:59:59          
  CLS            Outpatient                                                    
        

 

 [id number]            2013 12:03:00            2013 23:59:59          
  CLS            Outpatient            RACHAEL DOAN PA-C                   
                            

 

 [id number]            2012 14:32:00            2012 23:59:59          
  CLS            Outpatient            GALEN THOMPSON DO                        
                       

 

 [id number]            2012 09:10:00            2012 23:59:59            
CLS            Outpatient            GALEN THOMPSON DO                          
                     

 

 [id number]            2013 16:37:00                                      
Document Registration                                                          
  

 

 [id number]            2013 17:12:00                                      
Document Registration                                                          
  

 

 [id number]            2013 12:16:00                                      
Document Registration                                                          
  

 

 [id number]            2013 17:04:00                                      
Document Registration                                                          
  

 

 [id number]            2013 13:33:00                                      
Document Registration                                                          
  

 

 [id number]            2013 14:14:00                                      
Document Registration                                                          
  

 

 [id number]            2013 13:15:00                                      
Document Registration
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
## 2019-01-10 NOTE — XMS REPORT
[facility]

 Created on: 2017



Cindy Negrete

External Reference #: [id number]

: 1960

Sex: Female



Demographics







 Address  [address] [place] [address]  [address]

 

 Preferred Language  Unknown

 

 Marital Status  Unknown

 

 Worship Affiliation  Unknown

 

 Race  Unknown

 

 Ethnic Group  Unknown





Author







 Author  CLARISSA DOBSON

 

 Organization  [facility]

 

 Address  [address] [place]  [address]



 

 Phone  [phone]







Care Team Providers







 Care Team Member Name  Role  Phone

 

 CLARISSA DOBSON  Unavailable  [phone]







PROBLEMS







 Type  Condition  ICD9-CM Code  ZFT94-YE Code  Onset Dates  Condition Status  
SNOMED Code

 

 Problem  Left shoulder pain     M25.512     Active  32797543

 

 Problem  Rheumatoid arthritis with positive rheumatoid factor, involving 
unspecified site     M05.9     Active  978754203

 

 Problem  Allergic rhinitis, unspecified allergic rhinitis trigger, unspecified 
rhinitis seasonality     J30.9     Active  27261789

 

 Problem  Peripheral polyneuropathy     G62.9     Active  46866252

 

 Problem  Numbness in feet     R20.0     Active  120964922

 

 Problem  Hepatitis C     B19.20     Active  11198978

 

 Problem  Raynauds disease without gangrene     I73.00     Active  309216449

 

 Problem  Essential hypertension     I10     Active  99170981







ALLERGIES







 Substance  Reaction  Event Type  Date  Status

 

 Benzodiazepines  Failed UDS  Non Drug Allergy  15 Dec, 2016  Active

 

 Amphetamine  Failed UDS  Non Drug Allergy  15 Dec, 2016  Active

 

 Hydrocodone  Failed UDS  Non Drug Allergy  15 Dec, 2016  Active







SOCIAL HISTORY

No smoking Hx information available



PLAN OF CARE







 Activity  Details









  









 Follow Up  2-3 months for Emily, referral to Dr. Hughes for rhematoid 
treatmetnt Reason:







VITAL SIGNS







 Height  64 in  2016-12-15

 

 Weight  167.8 lbs  2016-12-15

 

 Temperature  97.4 degrees Fahrenheit  2016-12-15

 

 Heart Rate  88 bpm  2016-12-15

 

 Respiratory Rate  20   2016-12-15

 

 BMI  28.80 kg/m2  2016-12-15

 

 Blood pressure systolic  136 mmHg  2016-12-15

 

 Blood pressure diastolic  80 mmHg  2016-12-15







MEDICATIONS







 Medication  Instructions  Dosage  Frequency  Start Date  End Date  Duration  
Status

 

 Potassium Chloride 10 mEq  oral daily  10 mEq by Oral route 1 time per day  
24h           Active

 

 Hydrochlorothiazide 25 mg  oral daily  1 tablet by Oral route 1 time per day  
24h           Active

 

 amlodipine 5 mg  oral daily  1 tablet by Oral route 1 time per day  24h       
    Active

 

 Losartan Potassium 100 MG  Orally Once a day at hs  1/2  tablet     10 Mar, 
2016     90  Active

 

 Fish Oil Concentrate 1000 mg     1 Capsule by Oral route 1 time per day     22 
Oct, 2012        Active

 

 HydrOXYzine HCl 10 mg     1 tablet by Oral route 4 times per day PRN for 
anxiety     19 Dec, 2014        Active

 

 Celebrex 200 mg  oral daily  1 capsule by Oral route 1 time per day  24h      
     Active

 

 Gabapentin 300 MG  Orally at bedtime  1 capsule     15 Dec, 2016     30 day(s)
  Active







RESULTS

No Results



PROCEDURES







 Procedure  Date Ordered  Related Diagnosis  Body Site

 

 Office Visit, Est Pt., Level 4  Dec 15, 2016      







IMMUNIZATIONS

No Known Immunizations
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
## 2019-01-10 NOTE — XMS REPORT
[facility]

 Created on: 2016



EryndellaCindy petty

External Reference #: [id number]

: 1960

Sex: Female



Demographics







 Address  [address] [place] [address]  [address]

 

 Home Phone  [phone]

 

 Preferred Language  Unknown

 

 Marital Status  Unknown

 

 Evangelical Affiliation  Unknown

 

 Race  White

 

 Ethnic Group  Not  or 





Author







 Author  CLARISSA DOBSON

 

 Organization  [employer]

 

 Address  Unknown

 

 Phone  Unavailable







Care Team Providers







 Care Team Member Name  Role  Phone

 

 CLARISSA DOBSON  CP  Unavailable



                                                                



Allergies

          No Known Allergies                                                   
                                     



Problems

          





 Problem Type  Condition  Code  Onset Dates  Condition Status

 

 Problem  STATE HEP A (ADULT) DX  V05.3     Active

 

 Problem  Varices of other sites  456.8     Active

 

 Problem  Unspecified urticaria  708.9     Active

 

 Problem  Rheumatoid aortitis  I01.1     Active

 

 Assessment  Essential hypertension  I10     Active

 

 Problem  Numbness in feet  R20.0     Active

 

 Assessment  Hepatitis C  B19.20     Active

 

 Assessment  Numbness in feet  R20.0     Active

 

 Problem  Essential hypertension  I10     Active

 

 Problem  Pain in joint, site unspecified  719.40     Active

 

 Problem  Rheumatoid arthritis  714.0     Active

 

 Problem  Hepatitis C  B19.20     Active

 

 Problem  Left shoulder pain  M25.512     Active

 

 Problem  Unspecified breast screening  V76.10     Active

 

 Problem  Unspecified viral hepatitis C without hepatic coma  070.70     Active

 

 Problem  Viral warts, unspecified  078.10     Active

 

 Problem  Screening for malignant neoplasm of the cervix  V76.2     Active

 

 Problem  Essential hypertension, benign  401.1     Active

 

 Problem  Accidental cut, puncture, perforation, or hemorrhage during injection 
or vaccination  E870.3     Active

 

 Problem  Unspecified arthropathy, site unspecified  716.90     Active

 

 Problem  Chest pain, unspecified  786.50     Active

 

 Assessment  Rheumatoid aortitis  I01.1     Active

 

 Problem  Routine general medical examination at health care facility  V70.0   
  Active

 

 Problem  Raynaud's syndrome  443.0     Active



                                                                               
                                                                               
                                                                               
                                                                               
  



Medications

          No Known Medications                                                 
                                       



Procedures

          





 Procedure  Coding System  Code  Date

 

 No Charge  CPT-4  11926  2016



                                                                              



Results

          No Known Results                                                     
               



Summary Purpose

          [employer] Submission
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
## 2019-01-10 NOTE — XMS REPORT
[facility]

 Created on: 2015



Cindy Negrete

External Reference #: [id number]

: 1960

Sex: Female



Demographics







 Address  [address] [place] [address]  [address]

 

 Home Phone  [phone]

 

 Preferred Language  Unknown

 

 Marital Status  Unknown

 

 Christian Affiliation  Unknown

 

 Race  White

 

 Ethnic Group  Not  or 





Author







 Author  CLARISSA DOBSON

 

 [facility]  [employer]

 

 Address  Unknown

 

 Phone  Unavailable







Care Team Providers







 Care Team Member Name  Role  Phone

 

 CLARISSA DOBSON  CP  Unavailable



                                                                



Allergies, Adverse Reactions, Alerts

          





 Substance  Reaction  Event Type

 

 Amphetamine  Failed UDS  Non Drug Allergy

 

 Benzodiazepines  Failed UDS  Non Drug Allergy

 

 Hydrocodone  Failed UDS  Non Drug Allergy



                                                                               
                             



Problems

          





 Problem Type  Condition  Code  Onset Dates  Condition Status

 

 Problem  Chest pain, unspecified  786.50     Active

 

 Assessment  Left shoulder pain  M25.512     Active

 

 Problem  Raynaud's syndrome  443.0     Active

 

 Assessment  Hepatitis C  B19.20     Active

 

 Problem  STATE HEP A (ADULT) DX  V05.3     Active

 

 Problem  Varices of other sites  456.8     Active

 

 Problem  Unspecified urticaria  708.9     Active

 

 Problem  Rheumatoid aortitis  I01.1     Active

 

 Problem  Numbness in feet  R20.0     Active

 

 Assessment  Essential hypertension  I10     Active

 

 Assessment  Rheumatoid aortitis  I01.1     Active

 

 Problem  Essential hypertension  I10     Active

 

 Assessment  Numbness in feet  R20.0     Active

 

 Problem  Pain in joint, site unspecified  719.40     Active

 

 Problem  Rheumatoid arthritis  714.0     Active

 

 Problem  Hepatitis C  B19.20     Active

 

 Problem  Left shoulder pain  M25.512     Active

 

 Problem  Unspecified breast screening  V76.10     Active

 

 Problem  Unspecified viral hepatitis C without hepatic coma  070.70     Active

 

 Problem  Viral warts, unspecified  078.10     Active

 

 Problem  Screening for malignant neoplasm of the cervix  V76.2     Active

 

 Problem  Essential hypertension, benign  401.1     Active

 

 Problem  Accidental cut, puncture, perforation, or hemorrhage during injection 
or vaccination  E870.3     Active

 

 Problem  Unspecified arthropathy, site unspecified  716.90     Active

 

 Problem  Routine general medical examination at health care facility  V70.0   
  Active



                                                                               
                                                                               
                                                                               
                                                                               
            



Medications

          





 Medication  Code System  Code  Instructions  Start Date  End Date  Status  
Dosage

 

 amlodipine  NDC  [address]-84  5 mg oral daily  Dec 19, 2014        1 tablet 
by Oral route 1 time per day

 

 Hydrochlorothiazide  NDC  [address]-94  25 mg oral daily  Dec 19, 2014        
1 tablet by Oral route 1 time per day

 

 Potassium Chloride  NDC  [address]-37  10 mEq oral daily  Dec 19, 2014        
10 mEq by Oral route 1 time per day

 

 Celebrex  NDC  [address]-47  200 mg oral daily  Dec 19, 2014        1 capsule 
by Oral route 1 time per day



                                                                               
                                       



Procedures

          





 Procedure  Coding System  Code  Date

 

 Office Visit, Est Pt., Level 5  CPT-4  71258  Dec 30, 2015



                                                                               
                   



Vital Signs

          





 Date/Time:  Dec 30, 2015

 

 Temperature  98.1 F

 

 Weight  158.9 lbs

 

 Height  64 in

 

 BMI  27.27 Index

 

 Blood Pressure Diastolic  98 mmHg

 

 Blood Pressure Systolic  136 mmHg

 

 Cardiac Monitoring Heart Rate  96 bpm



                                                                              



Results

          No Known Results                                                     
               



Summary Purpose

          eClinicalWorks Submission
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
## 2019-01-10 NOTE — XMS REPORT
[facility]

 Created on: 2017



Caden Cindy

External Reference #: [id number]

: 1960

Sex: Female



Demographics







 Address  [address] [place]  [address]

 

 Preferred Language  Unknown

 

 Marital Status  Unknown

 

 Protestant Affiliation  Unknown

 

 Race  Unknown

 

 Ethnic Group  Unknown





Author







 Author  CLARISSA DOBSON

 

 [facility]

 

 Address  [address] [place]  [address]



 

 Phone  [phone]







Care Team Providers







 Care Team Member Name  Role  Phone

 

 CLARISSA DOBSON  Unavailable  [phone]







PROBLEMS







 Type  Condition  ICD9-CM Code  CPI25-OK Code  Onset Dates  Condition Status  
SNOMED Code

 

 Problem  Essential hypertension     I10     Active  11005578

 

 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

 

 Problem  Left shoulder pain     M25.512     Active  55717429

 

 Assessment  Essential hypertension     I10  28 Sep, 2016  Active  17202903

 

 Problem  Numbness in feet     R20.0     Active  779016384

 

 Problem  Hepatitis C     B19.20     Active  12527409







ALLERGIES

Unknown Allergies



SOCIAL HISTORY

No smoking Hx information available



PLAN OF CARE





VITAL SIGNS





MEDICATIONS

Unknown Medications



RESULTS







 Name  Result  Date  Reference Range

 

 VITAMIN B12     2016   

 

 Vitamin B12  439     211-946

 

 TSH     2016   

 

 TSH  0.976     0.450-4.500

 

 CBC     2016   

 

 WBC  6.0     3.4-10.8

 

 RBC  5.53     3.77-5.28

 

 Hemoglobin  17.4     11.1-15.9

 

 Hematocrit  50.3     34.0-46.6

 

 MCV  91     79-97

 

 MCH  31.5     26.6-33.0

 

 MCHC  34.6     31.5-35.7

 

 RDW  13.5     12.3-15.4

 

 Platelets  200     150-379

 

 Neutrophils  70      

 

 Lymphs  21      

 

 Monocytes  5      

 

 Eos  4      

 

 Basos  0      

 

 Neutrophils (Absolute)  4.2     1.4-7.0

 

 Lymphs (Absolute)  1.3     0.7-3.1

 

 Monocytes(Absolute)  0.3     0.1-0.9

 

 Eos (Absolute)  0.2     0.0-0.4

 

 Baso (Absolute)  0.0     0.0-0.2

 

 Immature Granulocytes  0      

 

 Immature Grans (Abs)  0.0     0.0-0.1

 

 RA (RHEUMATOID) FACTOR     2016   

 

 RA Latex Turbid.  121.1     0.0-13.9

 

 CMP     2016   

 

 Glucose, Serum  157     65-99

 

 BUN  14     6-24

 

 Creatinine, Serum  0.71     0.57-1.00

 

 eGFR If NonAfricn Am  97         >59

 

 eGFR If Africn Am  112         >59

 

 BUN/Creatinine Ratio  20     9-23

 

 Sodium, Serum  143     134-144

 

 Potassium, Serum  4.2     3.5-5.2

 

 Chloride, Serum  101     

 

 Carbon Dioxide, Total  23     18-29

 

 Calcium, Serum  9.2     8.7-10.2

 

 Protein, Total, Serum  7.2     6.0-8.5

 

 Albumin, Serum  4.4     3.5-5.5

 

 Globulin, Total  2.8     1.5-4.5

 

 A/G Ratio  1.6     1.1-2.5

 

 Bilirubin, Total  0.5     0.0-1.2

 

 Alkaline Phosphatase, S  84     

 

 AST (SGOT)  66     0-40

 

 ALT (SGPT)  92     0-32







PROCEDURES







 Procedure  Date Ordered  Related Diagnosis  Body Site

 

 COMPLETE CBC W/AUTO DIFF WBC  2016      

 

 COMPREHEN METABOLIC PANEL  2016      

 

 VITAMIN B-12  2016      

 

 ASSAY THYROID STIM HORMONE  2016      

 

 VENIPUNCT, ROUTINE*  2016      

 

 RHEUMATOID FACTOR, QUANT  2016      







IMMUNIZATIONS

No Known Immunizations
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
## 2019-11-12 ENCOUNTER — HOSPITAL ENCOUNTER (INPATIENT)
Dept: HOSPITAL 75 - ER | Age: 59
LOS: 3 days | Discharge: HOME | DRG: 603 | End: 2019-11-15
Attending: FAMILY MEDICINE | Admitting: FAMILY MEDICINE
Payer: COMMERCIAL

## 2019-11-12 VITALS — HEIGHT: 62.99 IN | WEIGHT: 153.66 LBS | BODY MASS INDEX: 27.23 KG/M2

## 2019-11-12 DIAGNOSIS — F15.90: ICD-10-CM

## 2019-11-12 DIAGNOSIS — F17.210: ICD-10-CM

## 2019-11-12 DIAGNOSIS — R05: ICD-10-CM

## 2019-11-12 DIAGNOSIS — D61.818: ICD-10-CM

## 2019-11-12 DIAGNOSIS — R19.7: ICD-10-CM

## 2019-11-12 DIAGNOSIS — I25.119: ICD-10-CM

## 2019-11-12 DIAGNOSIS — I87.8: ICD-10-CM

## 2019-11-12 DIAGNOSIS — E11.51: ICD-10-CM

## 2019-11-12 DIAGNOSIS — F41.9: ICD-10-CM

## 2019-11-12 DIAGNOSIS — I77.6: ICD-10-CM

## 2019-11-12 DIAGNOSIS — E11.65: ICD-10-CM

## 2019-11-12 DIAGNOSIS — M06.9: ICD-10-CM

## 2019-11-12 DIAGNOSIS — E78.5: ICD-10-CM

## 2019-11-12 DIAGNOSIS — L97.229: ICD-10-CM

## 2019-11-12 DIAGNOSIS — I10: ICD-10-CM

## 2019-11-12 DIAGNOSIS — Z91.14: ICD-10-CM

## 2019-11-12 DIAGNOSIS — R78.89: ICD-10-CM

## 2019-11-12 DIAGNOSIS — N28.9: ICD-10-CM

## 2019-11-12 DIAGNOSIS — D89.1: ICD-10-CM

## 2019-11-12 DIAGNOSIS — B19.20: ICD-10-CM
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
## 2019-11-12 DIAGNOSIS — R09.81: ICD-10-CM

## 2019-11-12 DIAGNOSIS — E11.40: ICD-10-CM

## 2019-11-12 DIAGNOSIS — S81.832A: ICD-10-CM

## 2019-11-12 DIAGNOSIS — L03.116: Primary | ICD-10-CM

## 2019-11-12 DIAGNOSIS — R31.9: ICD-10-CM

## 2019-11-12 LAB
ALBUMIN SERPL-MCNC: 3.3 GM/DL (ref 3.2–4.5)
ALP SERPL-CCNC: 144 U/L (ref 40–136)
ALT SERPL-CCNC: 24 U/L (ref 0–55)
AMORPH SED URNS QL MICRO: (no result) /LPF
APTT BLD: 24 SEC (ref 24–35)
APTT PPP: (no result) S
BACTERIA #/AREA URNS HPF: (no result) /HPF
BARBITURATES UR QL: NEGATIVE
BASOPHILS # BLD AUTO: 0 10^3/UL (ref 0–0.1)
BASOPHILS NFR BLD AUTO: 0 % (ref 0–10)
BENZODIAZ UR QL SCN: NEGATIVE
BILIRUB SERPL-MCNC: 0.6 MG/DL (ref 0.1–1)
BILIRUB UR QL STRIP: NEGATIVE
BUN/CREAT SERPL: 31
CALCIUM SERPL-MCNC: 8.5 MG/DL (ref 8.5–10.1)
CAOX CRY #/AREA URNS LPF: (no result) /LPF
CHLORIDE SERPL-SCNC: 107 MMOL/L (ref 98–107)
CO2 SERPL-SCNC: 20 MMOL/L (ref 21–32)
COCAINE UR QL: NEGATIVE
CREAT SERPL-MCNC: 0.75 MG/DL (ref 0.6–1.3)
EOSINOPHIL # BLD AUTO: 0 10^3/UL (ref 0–0.3)
EOSINOPHIL NFR BLD AUTO: 1 % (ref 0–10)
ERYTHROCYTE [DISTWIDTH] IN BLOOD BY AUTOMATED COUNT: 15.4 % (ref 10–14.5)
ERYTHROCYTE [SEDIMENTATION RATE] IN BLOOD: 18 MM/HR (ref 0–30)
FIBRINOGEN PPP-MCNC: (no result) MG/DL
GFR SERPLBLD BASED ON 1.73 SQ M-ARVRAT: > 60 ML/MIN
GLUCOSE SERPL-MCNC: 246 MG/DL (ref 70–105)
GLUCOSE UR STRIP-MCNC: (no result) MG/DL
HCT VFR BLD CALC: 33 % (ref 35–52)
HGB BLD-MCNC: 10.6 G/DL (ref 11.5–16)
INR PPP: 1.1 (ref 0.8–1.4)
KETONES UR QL STRIP: NEGATIVE
LEUKOCYTE ESTERASE UR QL STRIP: NEGATIVE
LYMPHOCYTES # BLD AUTO: 0.3 X 10^3 (ref 1–4)
LYMPHOCYTES NFR BLD AUTO: 9 % (ref 12–44)
MANUAL DIFFERENTIAL PERFORMED BLD QL: NO
MCH RBC QN AUTO: 28 PG (ref 25–34)
MCHC RBC AUTO-ENTMCNC: 32 G/DL (ref 32–36)
MCV RBC AUTO: 87 FL (ref 80–99)
METHADONE UR QL SCN: NEGATIVE
METHAMPHETAMINE SCREEN URINE S: POSITIVE
MONOCYTES # BLD AUTO: 0.2 X 10^3 (ref 0–1)
MONOCYTES NFR BLD AUTO: 6 % (ref 0–12)
NEUTROPHILS # BLD AUTO: 3.1 X 10^3 (ref 1.8–7.8)
NEUTROPHILS NFR BLD AUTO: 84 % (ref 42–75)
NITRITE UR QL STRIP: NEGATIVE
OPIATES UR QL SCN: NEGATIVE
OXYCODONE UR QL: NEGATIVE
PH UR STRIP: 5.5 [PH] (ref 5–9)
PLATELET # BLD: 176 10^3/UL (ref 130–400)
PMV BLD AUTO: 10.3 FL (ref 7.4–10.4)
POTASSIUM SERPL-SCNC: 3.8 MMOL/L (ref 3.6–5)
PROPOXYPH UR QL: NEGATIVE
PROT SERPL-MCNC: 6.1 GM/DL (ref 6.4–8.2)
PROT UR QL STRIP: (no result)
PROTHROMBIN TIME: 14.1 SEC (ref 12.2–14.7)
RBC #/AREA URNS HPF: (no result) /HPF
SODIUM SERPL-SCNC: 137 MMOL/L (ref 135–145)
SP GR UR STRIP: >=1.03 (ref 1.02–1.02)
SQUAMOUS #/AREA URNS HPF: (no result) /HPF
TRICYCLICS UR QL SCN: NEGATIVE
WBC # BLD AUTO: 3.7 10^3/UL (ref 4.3–11)
WBC #/AREA URNS HPF: (no result) /HPF

## 2019-11-12 PROCEDURE — 83605 ASSAY OF LACTIC ACID: CPT

## 2019-11-12 PROCEDURE — 36415 COLL VENOUS BLD VENIPUNCTURE: CPT

## 2019-11-12 PROCEDURE — 85027 COMPLETE CBC AUTOMATED: CPT

## 2019-11-12 PROCEDURE — 93923 UPR/LXTR ART STDY 3+ LVLS: CPT

## 2019-11-12 PROCEDURE — 96361 HYDRATE IV INFUSION ADD-ON: CPT

## 2019-11-12 PROCEDURE — 93306 TTE W/DOPPLER COMPLETE: CPT

## 2019-11-12 PROCEDURE — 73590 X-RAY EXAM OF LOWER LEG: CPT

## 2019-11-12 PROCEDURE — 84484 ASSAY OF TROPONIN QUANT: CPT

## 2019-11-12 PROCEDURE — 85025 COMPLETE CBC W/AUTO DIFF WBC: CPT

## 2019-11-12 PROCEDURE — 93458 L HRT ARTERY/VENTRICLE ANGIO: CPT

## 2019-11-12 PROCEDURE — 81000 URINALYSIS NONAUTO W/SCOPE: CPT

## 2019-11-12 PROCEDURE — 96367 TX/PROPH/DG ADDL SEQ IV INF: CPT

## 2019-11-12 PROCEDURE — 80053 COMPREHEN METABOLIC PANEL: CPT

## 2019-11-12 PROCEDURE — 80202 ASSAY OF VANCOMYCIN: CPT

## 2019-11-12 PROCEDURE — 96365 THER/PROPH/DIAG IV INF INIT: CPT

## 2019-11-12 PROCEDURE — 82962 GLUCOSE BLOOD TEST: CPT

## 2019-11-12 PROCEDURE — 90715 TDAP VACCINE 7 YRS/> IM: CPT

## 2019-11-12 PROCEDURE — 86141 C-REACTIVE PROTEIN HS: CPT

## 2019-11-12 PROCEDURE — 93005 ELECTROCARDIOGRAM TRACING: CPT

## 2019-11-12 PROCEDURE — 80306 DRUG TEST PRSMV INSTRMNT: CPT

## 2019-11-12 PROCEDURE — 85007 BL SMEAR W/DIFF WBC COUNT: CPT

## 2019-11-12 PROCEDURE — 87040 BLOOD CULTURE FOR BACTERIA: CPT

## 2019-11-12 PROCEDURE — 85652 RBC SED RATE AUTOMATED: CPT

## 2019-11-12 PROCEDURE — 85610 PROTHROMBIN TIME: CPT

## 2019-11-12 PROCEDURE — 80320 DRUG SCREEN QUANTALCOHOLS: CPT

## 2019-11-12 PROCEDURE — 85730 THROMBOPLASTIN TIME PARTIAL: CPT

## 2019-11-12 RX ADMIN — SODIUM CHLORIDE SCH MLS/HR: 900 INJECTION, SOLUTION INTRAVENOUS at 23:59

## 2019-11-12 NOTE — NUR
SOLO LOPEZ admitted to room 430-1, with an admitting diagnosis of LEFT LEG 
CELLULITIS, on 11/12/19 from ED via WHEELCHAIR, accompanied by STAFF.SOLO LOPEZ 
introduced to surroundings, call light, bed controls, phone, TV, temperature control, 
lights, meal times, smoking policy, visitor policy, side rail policy, bathrooms and showers. 
 Patient Rights given to patient in the handbook. SOLO LOPEZ verbalizes 
understanding that [facility] is not responsible for the loss or damage to any personal 
effects or valuables that are kept in the patients posession during their hospitalization.

## 2019-11-12 NOTE — DIAGNOSTIC IMAGING REPORT
CLINICAL HISTORY: Leg pain.



COMPARISON: None



TECHNIQUE: 2 views of the left tibia and fibula.



FINDINGS: 

There is no acute fracture or dislocation of the tibia and

fibula.  Alignment is anatomic.  The imaged joint spaces are

preserved.  No joint effusion is seen in the left knee.  The soft

tissues are unremarkable.



IMPRESSION: 

1. No acute fracture or dislocation in the left tibia and fibula.

No focal osseous lesions.



Dictated by: 



  Dictated on workstation # [id number]

## 2019-11-12 NOTE — ED INTEGUMENTARY GENERAL
General


Chief Complaint:  Skin/Wound Problems


Stated Complaint:  L LEG WOUND INFECTION


Nursing Triage Note:  


INJURED LEG IN AUGUST AND IS NOT HEALING SAW JENARO SCHULER TODAY AND WAS TOLD TO




COME TO THE ER FOR POTENTIAL IV ANTIBIOTICS, NOTED SCAB , REDNESS, WARMNESS TO 


ANTERIOR LEFT SHIN


Source:  patient





History of Present Illness


Date Seen by Provider:  2019


Time Seen by Provider:  20:47


Initial Comments


PT ARRIVES VIA POV 


STATES SHE WAS TOLD TO COME HERE BY DR. RAMYA SCHULER-- "MIGHT NEED ANTIBIOTICS" 


PT HAS WOUND TO LEFT MID SHIN AREA SINCE --STATES SHE POKED HER LEG ON A W

ICKER BASKET, AND HAS NOT HEALED


HAS NOT SOUGHT CARE FOR IT UNTIL TODAY, WHEN SHE SAW DR. SCHULER AT [facility]


SYMPTOMS ARE NO DIFFERENT TODAY IN ANY WAY


NO FEVER


ONLY DRAINAGE FROM IT WAS WHEN SHE HAD COVERED IT WITH A BANDAID, AND THE SCAB 

CAME OFF WITH THE BANDAID--THIS WAS NOT RECENTLY. IT IS NOT DRAINING NOW, AND IS

SCABBED OVER


HAS REDNESS, SWELLING AND WARMTH TO THE AREA





PT IS DIABETIC, BUT HAS NOT BEEN TAKING HER MEDICATION ( PILLS) ON ANY REGULAR 

BASIS


PT ONLY OCCASIONALLY CHECKS HER BLOOD SUGAR--STATES IT  YESTERDAY





PT STATES SHE WAS IN [place] IN SEPTEMBER FOR "VASCULITIS" OF HER LEGS FOR 3 DAYS. 


STATES SHE HAD THIS WOUND ON HER LEG THEN, "BUT IT WASN'T LIKE IT IS NOW" 





MESSAGE WAS LEFT WITH ER STAFF, BY DR. RAMYA SCHULER--SHE HAD SENT TEXT MESSAGE TO 

DR. ASTORGA WITH WOUND CARE AND SHE WANTS PT ADMITTED AND IV ANTIBIOTICS STARTED. 


PT IS KNOWN IV METH USER, ALTHOUGH PT CLAIMS "NOT FOR YEARS" 


PT HAS UNTREATED HEPATITIS C


PT ALSO HAS HISTORY OF RHEUMATOID ARTHRITIS, AND CRYOGLOBINEMIA--STATES SHE HAS 

NEVER SEEN A RHEUMATOLOGIST OR HEMATOLOGIST/ONCOLOGIST





PCP: DR. RAMYA SCHULER, [facility]





Allergies and Home Medications


Allergies


Coded Allergies:  


     hydrocodone (Verified  Allergy, Unknown, 1/10/19)





Home Medications


Sulfamethoxazole/Trimethoprim 1 Each Tablet, 2 EACH PO BID


   Prescribed by: YADIRA THOMAS on 1/10/19 3854





Patient Home Medication List


Home Medication List Reviewed:  Yes





Review of Systems


Review of Systems


Constitutional:  no symptoms reported


Respiratory:  no symptoms reported


Cardiovascular:  no symptoms reported


Gastrointestinal:  no symptoms reported


Musculoskeletal:  see HPI


Skin:  see HPI


Psychiatric/Neurological:  No Symptoms Reported


Endocrine:  See HPI


Hematologic/Lymphatic:  No Symptoms Reported





Past Medical-Social-Family Hx


Past Med/Social Hx:  Reviewed and Corrections made


Patient Social History


Alcohol Use:  Past History (HISTORY OF ABUSE, CLAIMS NO RECENT USE)


Recreational Drug Use:  Yes (+ IV METH USE)


Drug of Choice:  + IV METH USE


Smoking Status:  Current Everyday Smoker (1 PPD)


Type Used:  Cigarettes (1 PPD)


2nd Hand Smoke Exposure:  Yes


Recent Foreign Travel:  No


Contact w/Someone Who Travel:  No


Recent Infectious Disease Expo:  No


Recent Hopitalizations:  No


Physical Abuse:  No


Sexual Abuse:  No


Mistreated:  No


Fear:  No





Immunizations Up To Date


Tetanus Booster (TDap):  More than 5yrs ()





Seasonal Allergies


Seasonal Allergies:  No





Past Medical History


Surgeries:  Yes (LITHOTRIPSY; FACIAL RECONSTRUCTION;  X 1-TWINS)


 Section, Renal, Tubal Ligation


Respiratory:  No


Cardiac:  Yes (VASCULITIS OF LEGS)


Chronic Edema/Swelling, Hypertension, Peripheral Vascular


Neurological:  Yes


Neuropathy


Pregnant:  No


GYN History:  Menopausal


Genitourinary:  Yes


Kidney Stones


Gastrointestinal:  Yes (HEPATITIS C--NO TREATMENT)


Hepatitis


Musculoskeletal:  Yes (CRYOGLOBULINEMIA; CHRONIC LEG EDEMA, CELLULITIS AND 

VASCULITIS; PT CLAIMS "RA" BUT HAS NEVER SEEN RHEUMATOLOGIST OR BEEN ON ANY 

MEDICATION FOR RHEUMATOID; ALSO CLAIMS "RAYNAUD'S" BUT ALSO HAS NEVER BEEN SEEN 

BY SPECIALIST OR BEEN ON ANY MEDICATION FOR IT. )


Arthritis, Rheumatoid Arthritis


Endocrine:  Yes


Diabetes, Non-Insulin dep


HEENT:  No


Cancer:  No


Psychosocial:  Yes


Anxiety


Integumentary:  Yes (CHRONIC LEG EDEMA AND CELLULITIS ; VASCULITIS)


Blood Disorders:  No





Physical Exam


Vital Signs





Vital Signs - First Documented








 19





 20:26 21:00


 


Temp 37.0 


 


Pulse 108 


 


Resp 20 


 


B/P (MAP) 151/92 (111) 


 


Pulse Ox 100 


 


O2 Delivery  Room Air





Capillary Refill : Less Than 3 Seconds


General Appearance:  WD/WN, no apparent distress, other (REEKS OF CIGARETTES)


Cardiovascular:  normal peripheral pulses, regular rate, rhythm, no murmur


Respiratory:  normal breath sounds, no respiratory distress, no accessory muscle

use


Gastrointestinal:  non tender, soft


Extremities:  normal range of motion, no calf tenderness, other (BOTH LEGS WITH 

CHRONIC VENOUS STASIS CHANGES AND BRAWNY INDURATION; HAS EXTENSIVE MOTTLING FROM

JUST BELOW KNEES UP TO GROIN BILATERALLY. NO EDEMA NOTED AT THIS TIME.  ANTERIOR

ASPECT OF LEFT LOWER LEG/MID SHIN AREA WITH SCABBED WOUND WITH SWELLING, 

ERYTHEMA, WARMTH AND TENDERNESS. NO AREAS OF FLUCTUANCE, NO DRAINAGE. NO 

STREAKS. BOTH FEET ARE VERY COOL, BUT ARE PINK. CAPILLARY REFILL IS < 5 SECONDS 

TO FEET. NO WOUNDS TO FEET.  HANDS ALSO ARE VERY COOL AND BOTH ARE DUSKY--RIGHT 

> LEFT.  PEDAL PULSES +1 ON LEFT, AND VERY FAINT /BARELY PALPABLE ON LEFT. 

RADIAL PULSES ARE FAINT BILATERALLY. )


Neurologic/Psychiatric:  CNs II-XII nml as tested, no motor/sensory deficits 

(PERIPHERAL NEUROPATHY --FEET HYPERSENSITIVE TO TOUCH AT THIS TIME), alert, 

oriented x 3


Skin:  warm/dry, other (AS ABOVE; MULTIPLE SORES/SCARS/SCABS TO ARMS AND LEGS, A

S WELL AS OLD "TRACK MARKS"/SCARRING IN ARMS. )





Progress/Results/Core Measures


Results/Orders


Lab Results





Laboratory Tests








Test


 19


21:03 19


21:15 Range/Units


 


 


Glucometer 254 H    MG/DL


 


White Blood Count


 


 3.7 L


 4.3-11.0


10^3/uL


 


Red Blood Count


 


 3.78 L


 4.35-5.85


10^6/uL


 


Hemoglobin  10.6 L 11.5-16.0  G/DL


 


Hematocrit  33 L 35-52  %


 


Mean Corpuscular Volume  87  80-99  FL


 


Mean Corpuscular Hemoglobin  28  25-34  PG


 


Mean Corpuscular Hemoglobin


Concent 


 32 


 32-36  G/DL





 


Red Cell Distribution Width  15.4 H 10.0-14.5  %


 


Platelet Count


 


 176 


 130-400


10^3/uL


 


Mean Platelet Volume  10.3  7.4-10.4  FL


 


Neutrophils (%) (Auto)  84 H 42-75  %


 


Lymphocytes (%) (Auto)  9 L 12-44  %


 


Monocytes (%) (Auto)  6  0-12  %


 


Eosinophils (%) (Auto)  1  0-10  %


 


Basophils (%) (Auto)  0  0-10  %


 


Neutrophils # (Auto)  3.1  1.8-7.8  X 10^3


 


Lymphocytes # (Auto)  0.3 L 1.0-4.0  X 10^3


 


Monocytes # (Auto)  0.2  0.0-1.0  X 10^3


 


Eosinophils # (Auto)


 


 0.0 


 0.0-0.3


10^3/uL


 


Basophils # (Auto)


 


 0.0 


 0.0-0.1


10^3/uL


 


Erythrocyte Sedimentation Rate  18  0-30  MM/HR


 


Prothrombin Time  14.1  12.2-14.7  SEC


 


INR Comment  1.1  0.8-1.4  


 


Activated Partial


Thromboplast Time 


 24 


 24-35  SEC





 


Sodium Level  137  135-145  MMOL/L


 


Potassium Level  3.8  3.6-5.0  MMOL/L


 


Chloride Level  107    MMOL/L


 


Carbon Dioxide Level  20 L 21-32  MMOL/L


 


Anion Gap  10  5-14  MMOL/L


 


Blood Urea Nitrogen  23 H 7-18  MG/DL


 


Creatinine


 


 0.75 


 0.60-1.30


MG/DL


 


Estimat Glomerular Filtration


Rate 


 > 60 


  





 


BUN/Creatinine Ratio  31   


 


Glucose Level  246 H   MG/DL


 


Lactic Acid Level


 


 1.68 


 0.50-2.00


MMOL/L


 


Calcium Level  8.5  8.5-10.1  MG/DL


 


Corrected Calcium  9.1  8.5-10.1  MG/DL


 


Total Bilirubin  0.6  0.1-1.0  MG/DL


 


Aspartate Amino Transf


(AST/SGOT) 


 28 


 5-34  U/L





 


Alanine Aminotransferase


(ALT/SGPT) 


 24 


 0-55  U/L





 


Alkaline Phosphatase  144 H   U/L


 


C-Reactive Protein High


Sensitivity 


 3.15 H


 0.00-0.50


MG/DL


 


Total Protein  6.1 L 6.4-8.2  GM/DL


 


Albumin  3.3  3.2-4.5  GM/DL


 


Serum Alcohol  < 10  <10  MG/DL








My Orders





Orders - YADIRA THOMAS DO


Accucheck Stat ONCE (19 20:57)


Ed Iv/Invasive Line Start (19 20:57)


Tibia/Fibula, Left, 2 Views (19 20:57)


Alcohol (19 20:57)


Cbc With Automated Diff (19 20:57)


Comprehensive Metabolic Panel (19 20:57)


Hs C Reactive Protein (19 20:57)


Drug Screen Stat (Urine) (19 20:57)


Lactic Acid Analyzer (19 20:57)


Protime With Inr (19 20:57)


Partial Thromboplastin Time (19 20:57)


Ua Culture If Indicated (19 20:57)


Blood Culture (19 20:57)


Erythrocyte Sedimentation Rate (19 20:57)


Piperacillin Sodium/Tazobactam (Zosyn Vi (19 21:00)


Vancomycin Injection (Vancomycin Injecti (19 21:00)


Ed Iv/Invasive Line Start (19 21:23)


Ns Iv 1000 Ml (Sodium Chloride 0.9%) (19 21:23)





Medications Given in ED





Current Medications








 Medications  Dose


 Ordered  Sig/Irving


 Route  Start Time


 Stop Time Status Last Admin


Dose Admin


 


 Piperacillin Sod/


 Tazobactam Sod


 4.5 gm/Sodium


 Chloride  100 ml @ 


 200 mls/hr  ONCE  ONCE


 IV  19 21:00


 19 21:29 DC 19 21:37


200 MLS/HR


 


 Vancomycin HCl


 1000 mg/Sodium


 Chloride  250 ml @ 


 250 mls/hr  ONCE  ONCE


 IV  19 21:00


 19 21:59 DC 19 22:11


250 MLS/HR








Vital Signs/I&O











 19





 20:26 21:00 21:15 21:30


 


Temp 37.0   


 


Pulse 108 99 95 105


 


Resp 20 18 18 18


 


B/P (MAP) 151/92 (111) 145/89 140/91 156/96


 


Pulse Ox 100 99 100 98


 


O2 Delivery  Room Air Room Air 


 


    





 19   





 21:45   


 


Temp 36.5   


 


Pulse 104   


 


Resp 18   


 


B/P (MAP) 131/91   


 


Pulse Ox 100   


 


O2 Delivery Room Air   














Blood Pressure Mean:                    111


POS








Progress


Progress Note :  


Progress Note


UNEVENTFUL ER STAY


PT REMAINED CALM AND COOPERATIVE FOR ENTIRE ER STAY


VITALS STABLE.





Diagnostic Imaging





Comments


XRAYS LEFT TIB-FIB--NO ACUTE PROCESS, PER RADIOLOGIST REPORT AT 2220


   Reviewed:  Reviewed by Me





Departure


Communication (Admissions)


--SPOKE WITH DR. SCHUMACHER, ACCEPTS PT FOR ADMIT.





Impression





   Primary Impression:  


   CELLULITIS LEFT LOWER LEG


   Additional Impressions:  


   CHRONIC WOUND LEFT LOWER LEG


   NIDDM


   Methamphetamine use


   HX OF UNTREATED HEPATITIS C


   IV drug abuse


Disposition:   ADMITTED AS INPATIENT


Condition:  Stable





Admissions


Decision to Admit Reason:  Admit from ER (General)


Decision to Admit/Date:  2019


Time/Decision to Admit Time:  21:55





Departure-Patient Inst.


Referrals:  


[facility] (PCP)


Primary Care Physician








JENARO SCHULER MD (Family)


Primary Care Physician











YADIRA THOMAS DO                 2019 21:06


POS

## 2019-11-13 VITALS — DIASTOLIC BLOOD PRESSURE: 97 MMHG | SYSTOLIC BLOOD PRESSURE: 161 MMHG

## 2019-11-13 VITALS — DIASTOLIC BLOOD PRESSURE: 79 MMHG | SYSTOLIC BLOOD PRESSURE: 159 MMHG

## 2019-11-13 VITALS — SYSTOLIC BLOOD PRESSURE: 165 MMHG | DIASTOLIC BLOOD PRESSURE: 104 MMHG

## 2019-11-13 VITALS — DIASTOLIC BLOOD PRESSURE: 100 MMHG | SYSTOLIC BLOOD PRESSURE: 175 MMHG

## 2019-11-13 VITALS — SYSTOLIC BLOOD PRESSURE: 153 MMHG | DIASTOLIC BLOOD PRESSURE: 92 MMHG

## 2019-11-13 VITALS — DIASTOLIC BLOOD PRESSURE: 96 MMHG | SYSTOLIC BLOOD PRESSURE: 169 MMHG

## 2019-11-13 VITALS — SYSTOLIC BLOOD PRESSURE: 164 MMHG | DIASTOLIC BLOOD PRESSURE: 85 MMHG

## 2019-11-13 LAB
ALBUMIN SERPL-MCNC: 3 GM/DL (ref 3.2–4.5)
ALP SERPL-CCNC: 171 U/L (ref 40–136)
ALT SERPL-CCNC: 23 U/L (ref 0–55)
BASOPHILS # BLD AUTO: 0 10^3/UL (ref 0–0.1)
BASOPHILS NFR BLD AUTO: 0 % (ref 0–10)
BILIRUB SERPL-MCNC: 0.5 MG/DL (ref 0.1–1)
BUN/CREAT SERPL: 26
CALCIUM SERPL-MCNC: 8.1 MG/DL (ref 8.5–10.1)
CHLORIDE SERPL-SCNC: 111 MMOL/L (ref 98–107)
CO2 SERPL-SCNC: 17 MMOL/L (ref 21–32)
CREAT SERPL-MCNC: 0.73 MG/DL (ref 0.6–1.3)
EOSINOPHIL # BLD AUTO: 0 10^3/UL (ref 0–0.3)
EOSINOPHIL NFR BLD AUTO: 1 % (ref 0–10)
ERYTHROCYTE [DISTWIDTH] IN BLOOD BY AUTOMATED COUNT: 15.3 % (ref 10–14.5)
GFR SERPLBLD BASED ON 1.73 SQ M-ARVRAT: > 60 ML/MIN
GLUCOSE SERPL-MCNC: 193 MG/DL (ref 70–105)
HCT VFR BLD CALC: 32 % (ref 35–52)
HGB BLD-MCNC: 10.5 G/DL (ref 11.5–16)
LYMPHOCYTES # BLD AUTO: 0.4 X 10^3 (ref 1–4)
LYMPHOCYTES NFR BLD AUTO: 10 % (ref 12–44)
MANUAL DIFFERENTIAL PERFORMED BLD QL: NO
MCH RBC QN AUTO: 28 PG (ref 25–34)
MCHC RBC AUTO-ENTMCNC: 33 G/DL (ref 32–36)
MCV RBC AUTO: 87 FL (ref 80–99)
MONOCYTES # BLD AUTO: 0.2 X 10^3 (ref 0–1)
MONOCYTES NFR BLD AUTO: 7 % (ref 0–12)
NEUTROPHILS # BLD AUTO: 2.9 X 10^3 (ref 1.8–7.8)
NEUTROPHILS NFR BLD AUTO: 82 % (ref 42–75)
PLATELET # BLD: 127 10^3/UL (ref 130–400)
PMV BLD AUTO: 9.8 FL (ref 7.4–10.4)
POTASSIUM SERPL-SCNC: 3.8 MMOL/L (ref 3.6–5)
PROT SERPL-MCNC: 5.4 GM/DL (ref 6.4–8.2)
SODIUM SERPL-SCNC: 138 MMOL/L (ref 135–145)
WBC # BLD AUTO: 3.5 10^3/UL (ref 4.3–11)

## 2019-11-13 RX ADMIN — VANCOMYCIN HYDROCHLORIDE SCH MLS/HR: 500 INJECTION, POWDER, LYOPHILIZED, FOR SOLUTION INTRAVENOUS at 21:29

## 2019-11-13 RX ADMIN — SODIUM CHLORIDE SCH MLS/HR: 900 INJECTION, SOLUTION INTRAVENOUS at 05:10

## 2019-11-13 RX ADMIN — SODIUM CHLORIDE SCH MLS/HR: 900 INJECTION, SOLUTION INTRAVENOUS at 11:47

## 2019-11-13 RX ADMIN — INSULIN ASPART SCH UNIT: 100 INJECTION, SOLUTION INTRAVENOUS; SUBCUTANEOUS at 21:37

## 2019-11-13 RX ADMIN — ENOXAPARIN SODIUM SCH MG: 100 INJECTION SUBCUTANEOUS at 16:47

## 2019-11-13 RX ADMIN — SODIUM CHLORIDE SCH MLS/HR: 900 INJECTION, SOLUTION INTRAVENOUS at 16:54

## 2019-11-13 RX ADMIN — SODIUM CHLORIDE SCH MLS/HR: 900 INJECTION, SOLUTION INTRAVENOUS at 18:38

## 2019-11-13 RX ADMIN — SODIUM CHLORIDE SCH MLS/HR: 900 INJECTION, SOLUTION INTRAVENOUS at 20:46

## 2019-11-13 RX ADMIN — VANCOMYCIN HYDROCHLORIDE SCH MLS/HR: 500 INJECTION, POWDER, LYOPHILIZED, FOR SOLUTION INTRAVENOUS at 10:31

## 2019-11-13 RX ADMIN — INSULIN ASPART SCH UNIT: 100 INJECTION, SOLUTION INTRAVENOUS; SUBCUTANEOUS at 16:21

## 2019-11-13 RX ADMIN — LOSARTAN POTASSIUM SCH MG: 50 TABLET, FILM COATED ORAL at 16:48

## 2019-11-13 RX ADMIN — INSULIN ASPART SCH UNIT: 100 INJECTION, SOLUTION INTRAVENOUS; SUBCUTANEOUS at 05:10

## 2019-11-13 RX ADMIN — INSULIN ASPART SCH UNIT: 100 INJECTION, SOLUTION INTRAVENOUS; SUBCUTANEOUS at 11:47

## 2019-11-13 RX ADMIN — GABAPENTIN SCH MG: 100 CAPSULE ORAL at 20:44

## 2019-11-13 NOTE — WOUND CARE ASSESSMENT
Wound Care Assessment


Date Seen by Provider:  Nov 13, 2019


Time Seen by Provider:  12:20


Chief Complaint


Ulcer L calf.


HPI


The patient is a 59 year old female with infected L calf ulcer in a complex 

setting of vasculitis, uncontrolled diabetes, tobacco abuse, ?Venous 

insufficiency?, and potential nutritional issues. Arterial studies ordered.


Smoking Status:  Current Everyday Smoker (1 PPD)


Recreational Drug Use:  Yes (+ IV METH USE)


Alcohol Use:  Denies Use


Exam





Vital Signs








  Date Time  Temp Pulse Resp B/P (MAP) Pulse Ox O2 Delivery O2 Flow Rate FiO2


 


11/13/19 08:00 37.1 107 24 164/85 (111) 96 Room Air  





Capillary Refill : Less Than 3 Seconds





Results


Laboratory Tests


11/12/19 21:03: Glucometer 254H


11/12/19 21:15: 


White Blood Count 3.7L, Red Blood Count 3.78L, Hemoglobin 10.6L, Hematocrit 33L,

Mean Corpuscular Volume 87, Mean Corpuscular Hemoglobin 28, Mean Corpuscular 

Hemoglobin Concent 32, Red Cell Distribution Width 15.4H, Platelet Count 176, 

Mean Platelet Volume 10.3, Neutrophils (%) (Auto) 84H, Lymphocytes (%) (Auto) 9L

, Monocytes (%) (Auto) 6, Eosinophils (%) (Auto) 1, Basophils (%) (Auto) 0, 

Neutrophils # (Auto) 3.1, Lymphocytes # (Auto) 0.3L, Monocytes # (Auto) 0.2, 

Eosinophils # (Auto) 0.0, Basophils # (Auto) 0.0, Erythrocyte Sedimentation Rate

18, Prothrombin Time 14.1, INR Comment 1.1, Activated Partial Thromboplast Time 

24, Sodium Level 137, Potassium Level 3.8, Chloride Level 107, Carbon Dioxide 

Level 20L, Anion Gap 10, Blood Urea Nitrogen 23H, Creatinine 0.75, Estimat 

Glomerular Filtration Rate > 60, BUN/Creatinine Ratio 31, Glucose Level 246H, La

ctic Acid Level 1.68, Calcium Level 8.5, Corrected Calcium 9.1, Total Bilirubin 

0.6, Aspartate Amino Transf (AST/SGOT) 28, Alanine Aminotransferase (ALT/SGPT) 

24, Alkaline Phosphatase 144H, C-Reactive Protein High Sensitivity 3.15H, Total 

Protein 6.1L, Albumin 3.3, Serum Alcohol < 10


11/12/19 22:17: 


Urine Color DARK YELLOW, Urine Clarity SL CLOUDY, Urine pH 5.5, Urine Specific 

Gravity >=1.030, Urine Protein TRACE, Urine Glucose (UA) 1+H, Urine Ketones 

NEGATIVE, Urine Nitrite NEGATIVE, Urine Bilirubin NEGATIVE, Urine Urobilinogen 

1.0, Urine Leukocyte Esterase NEGATIVE, Urine RBC (Auto) 3+H, Urine RBC 5-10H, 

Urine WBC NONE, Urine Squamous Epithelial Cells 2-5, Urine Crystals PRESENTH, 

Urine Calcium Oxalate Crystals RAREH, Urine Amorphous Sediment FEW JACKSON URATESH,

Urine Bacteria TRACE, Urine Casts NONE, Urine Mucus MODERATEH, Urine Culture 

Indicated NO, Urine Opiates Screen NEGATIVE, Urine Oxycodone Screen NEGATIVE, 

Urine Methadone Screen NEGATIVE, Urine Propoxyphene Screen NEGATIVE, Urine 

Barbiturates Screen NEGATIVE, Ur Tricyclic Antidepressants Screen NEGATIVE, 

Urine Phencyclidine Screen NEGATIVE, Urine Amphetamines Screen POSITIVEH, Urine 

Methamphetamines Screen POSITIVEH, Urine Benzodiazepines Screen NEGATIVE, Urine 

Cocaine Screen NEGATIVE, Urine Cannabinoids Screen NEGATIVE


11/13/19 04:57: Glucometer 181H


11/13/19 05:10: 


Sodium Level 138, Potassium Level 3.8, Chloride Level 111H, Carbon Dioxide Level

17L, Anion Gap 10, Blood Urea Nitrogen 19H, Creatinine 0.73, Estimat Glomerular 

Filtration Rate > 60, BUN/Creatinine Ratio 26, Glucose Level 193H, Calcium Level

8.1L, Corrected Calcium 8.9, Total Bilirubin 0.5, Aspartate Amino Transf 

(AST/SGOT) 24, Alanine Aminotransferase (ALT/SGPT) 23, Alkaline Phosphatase 171H

, Total Protein 5.4L, Albumin 3.0L


11/13/19 05:20: 


White Blood Count 3.5L, Red Blood Count 3.70L, Hemoglobin 10.5L, Hematocrit 32L,

Mean Corpuscular Volume 87, Mean Corpuscular Hemoglobin 28, Mean Corpuscular 

Hemoglobin Concent 33, Red Cell Distribution Width 15.3H, Platelet Count 127L, 

Mean Platelet Volume 9.8, Neutrophils (%) (Auto) 82H, Lymphocytes (%) (Auto) 10L

, Monocytes (%) (Auto) 7, Eosinophils (%) (Auto) 1, Basophils (%) (Auto) 0, 

Neutrophils # (Auto) 2.9, Lymphocytes # (Auto) 0.4L, Monocytes # (Auto) 0.2, 

Eosinophils # (Auto) 0.0, Basophils # (Auto) 0.0


11/13/19 11:22: Glucometer 165H











IRASEMA ASTORGA MD             Nov 13, 2019 12:41


POS

## 2019-11-13 NOTE — NUR
SPOKE WITH THE PATIENT ABOUT HER MEDICATIONS. SHE LISTED WHAT SHE IS TAKING. SHE IS PAST DUE 
FOR REFILL ON SEVERAL AND ADMITS SHE WAS NOT TAKING THEM AS PRESCRIBED FOR AWHILE BUT HAS 
RESUMED RECENTLY. 



I NOTED THE PAST DUE FILL DATES ON THE MED REC.



I HAD A LIST FAXED OVER FROM [facility] MEDICAL RECORDS AS WELL. IN ADDITION TO WHAT 
[facility] METHOTREXATE #16 TABS 6-21-19.



SHE TAKES FISH OIL OTC. 



LEVSIN WAS ON THE LIST FROM [facility] FROM 9-6-19 HOWEVER NOT FILLED RECENTLY AND SHE DID NOT LIST 
SHE WAS TAKING IT SO I DID NOT INCLUDE IT ON THE MED REC AT THIS TIME.



SHE THOUGHT SHE WAS GIVEN SOME OF HER METFORMIN FROM  WHEN SHE WAS SENT HOME FROM THERE 
HOWEVER I CALLED AND THEY DID NOT HAVE RECORD OF DISPENSING ANY MEDICATIONS TO HER.

## 2019-11-13 NOTE — HISTORY & PHYSICAL
HPI


History of Present Illness:


Pt came to ER due to nonhealing wound on leg, hit on picnic basket this summer 

and didn't heal, started getting worse about a week ago and feeling sick. She 

has  not been on antibiotics for it so far. She has felt drained, fatigued, sick

to her stomach. No fever. She admits diarrhea as well as nasal congestion. No 

sore throat. She does admit cough.


Date seen by provider:  2019


Time Seen by Provider:  10:11


Attending Physician


Claudia Barton MD


PCP


[place] [place]


Consult





Date of Admission


2019 at 21:55





Home Medications


Home Medications


Reviewed patient Home Medication Reconciliation performed by pharmacy medication

reconciliations technician and/or nursing.


Patients Allergies have been reviewed.





Allergies


Coded Allergies:  


     hydrocodone (Verified  Allergy, Unknown, 1/10/19)





PMH-Social-Family Hx


Patient Social History


Alcohol Use:  Denies Use


Recreational Drug Use:  Yes (+ IV METH USE)


Drug of Choice:  + IV METH USE


Smoking Status:  Current Everyday Smoker (1 PPD)


Type Used:  Cigarettes (1 PPD)


2nd Hand Smoke Exposure:  Yes


Recent Foreign Travel:  No


Contact w/other who traveled:  No


Recent Hopitalizations:  No


Recent Infectious Disease Expo:  No





Immunizations Up To Date


Tetanus Booster (TDap):  More than 5yrs ()


Date of Influenza Vaccine:  Oct 1, 2019





Past Medical History





PMHx:


HTN


DMII


Hep C


Rheumatoid arthritis


Cryoglobulinemia





SurgHx:








Family Medical History


Significant Family History:  Heart Disease, Cancer (lung), Diabetes





Review of Systems ([facility])


Constitutional:  see HPI





Reviewed Test Results


Reviewed Test Results


Lab





Laboratory Tests








Test


 19


21:03 19


21:15 19


22:17 19


04:57 Range/Units


 


 


Glucometer 254 H   181 H   MG/DL


 


White Blood Count


 


 3.7 L


 


 


 4.3-11.0


10^3/uL


 


Red Blood Count


 


 3.78 L


 


 


 4.35-5.85


10^6/uL


 


Hemoglobin  10.6 L   11.5-16.0  G/DL


 


Hematocrit  33 L   35-52  %


 


Mean Corpuscular Volume  87    80-99  FL


 


Mean Corpuscular Hemoglobin  28    25-34  PG


 


Mean Corpuscular Hemoglobin


Concent 


 32 


 


 


 32-36  G/DL





 


Red Cell Distribution Width  15.4 H   10.0-14.5  %


 


Platelet Count


 


 176 


 


 


 130-400


10^3/uL


 


Mean Platelet Volume  10.3    7.4-10.4  FL


 


Neutrophils (%) (Auto)  84 H   42-75  %


 


Lymphocytes (%) (Auto)  9 L   12-44  %


 


Monocytes (%) (Auto)  6    0-12  %


 


Eosinophils (%) (Auto)  1    0-10  %


 


Basophils (%) (Auto)  0    0-10  %


 


Neutrophils # (Auto)  3.1    1.8-7.8  X 10^3


 


Lymphocytes # (Auto)  0.3 L   1.0-4.0  X 10^3


 


Monocytes # (Auto)  0.2    0.0-1.0  X 10^3


 


Eosinophils # (Auto)


 


 0.0 


 


 


 0.0-0.3


10^3/uL


 


Basophils # (Auto)


 


 0.0 


 


 


 0.0-0.1


10^3/uL


 


Erythrocyte Sedimentation Rate  18    0-30  MM/HR


 


Prothrombin Time  14.1    12.2-14.7  SEC


 


INR Comment  1.1    0.8-1.4  


 


Activated Partial


Thromboplast Time 


 24 


 


 


 24-35  SEC





 


Sodium Level  137    135-145  MMOL/L


 


Potassium Level  3.8    3.6-5.0  MMOL/L


 


Chloride Level  107      MMOL/L


 


Carbon Dioxide Level  20 L   21-32  MMOL/L


 


Anion Gap  10    5-14  MMOL/L


 


Blood Urea Nitrogen  23 H   7-18  MG/DL


 


Creatinine


 


 0.75 


 


 


 0.60-1.30


MG/DL


 


Estimat Glomerular Filtration


Rate 


 > 60 


 


 


  





 


BUN/Creatinine Ratio  31     


 


Glucose Level  246 H     MG/DL


 


Lactic Acid Level


 


 1.68 


 


 


 0.50-2.00


MMOL/L


 


Calcium Level  8.5    8.5-10.1  MG/DL


 


Corrected Calcium  9.1    8.5-10.1  MG/DL


 


Total Bilirubin  0.6    0.1-1.0  MG/DL


 


Aspartate Amino Transf


(AST/SGOT) 


 28 


 


 


 5-34  U/L





 


Alanine Aminotransferase


(ALT/SGPT) 


 24 


 


 


 0-55  U/L





 


Alkaline Phosphatase  144 H     U/L


 


C-Reactive Protein High


Sensitivity 


 3.15 H


 


 


 0.00-0.50


MG/DL


 


Total Protein  6.1 L   6.4-8.2  GM/DL


 


Albumin  3.3    3.2-4.5  GM/DL


 


Serum Alcohol  < 10    <10  MG/DL


 


Urine Color   DARK YELLOW    


 


Urine Clarity   SL CLOUDY    


 


Urine pH   5.5   5-9  


 


Urine Specific Gravity   >=1.030   1.016-1.022  


 


Urine Protein   TRACE   NEGATIVE  


 


Urine Glucose (UA)   1+ H  NEGATIVE  


 


Urine Ketones   NEGATIVE   NEGATIVE  


 


Urine Nitrite   NEGATIVE   NEGATIVE  


 


Urine Bilirubin   NEGATIVE   NEGATIVE  


 


Urine Urobilinogen   1.0   < = 1.0  MG/DL


 


Urine Leukocyte Esterase   NEGATIVE   NEGATIVE  


 


Urine RBC (Auto)   3+ H  NEGATIVE  


 


Urine RBC   5-10 H   /HPF


 


Urine WBC   NONE    /HPF


 


Urine Squamous Epithelial


Cells 


 


 2-5 


 


  /HPF





 


Urine Crystals   PRESENT H   /LPF


 


Urine Calcium Oxalate Crystals   RARE H   /LPF


 


Urine Amorphous Sediment


 


 


 FEW JACK[place]


URATES H 


  /LPF





 


Urine Bacteria   TRACE    /HPF


 


Urine Casts   NONE    /LPF


 


Urine Mucus   MODERATE H   /LPF


 


Urine Culture Indicated   NO    


 


Urine Opiates Screen   NEGATIVE   NEGATIVE  


 


Urine Oxycodone Screen   NEGATIVE   NEGATIVE  


 


Urine Methadone Screen   NEGATIVE   NEGATIVE  


 


Urine Propoxyphene Screen   NEGATIVE   NEGATIVE  


 


Urine Barbiturates Screen   NEGATIVE   NEGATIVE  


 


Ur Tricyclic Antidepressants


Screen 


 


 NEGATIVE 


 


 NEGATIVE  





 


Urine Phencyclidine Screen   NEGATIVE   NEGATIVE  


 


Urine Amphetamines Screen   POSITIVE H  NEGATIVE  


 


Urine Methamphetamines Screen   POSITIVE H  NEGATIVE  


 


Urine Benzodiazepines Screen   NEGATIVE   NEGATIVE  


 


Urine Cocaine Screen   NEGATIVE   NEGATIVE  


 


Urine Cannabinoids Screen   NEGATIVE   NEGATIVE  


 


Test


 19


05:10 19


05:20 19


11:22 


 Range/Units


 


 


Sodium Level 138     135-145  MMOL/L


 


Potassium Level 3.8     3.6-5.0  MMOL/L


 


Chloride Level 111 H      MMOL/L


 


Carbon Dioxide Level 17 L    21-32  MMOL/L


 


Anion Gap 10     5-14  MMOL/L


 


Blood Urea Nitrogen 19 H    7-18  MG/DL


 


Creatinine


 0.73 


 


 


 


 0.60-1.30


MG/DL


 


Estimat Glomerular Filtration


Rate > 60 


 


 


 


  





 


BUN/Creatinine Ratio 26      


 


Glucose Level 193 H      MG/DL


 


Calcium Level 8.1 L    8.5-10.1  MG/DL


 


Corrected Calcium 8.9     8.5-10.1  MG/DL


 


Total Bilirubin 0.5     0.1-1.0  MG/DL


 


Aspartate Amino Transf


(AST/SGOT) 24 


 


 


 


 5-34  U/L





 


Alanine Aminotransferase


(ALT/SGPT) 23 


 


 


 


 0-55  U/L





 


Alkaline Phosphatase 171 H      U/L


 


Total Protein 5.4 L    6.4-8.2  GM/DL


 


Albumin 3.0 L    3.2-4.5  GM/DL


 


White Blood Count


 


 3.5 L


 


 


 4.3-11.0


10^3/uL


 


Red Blood Count


 


 3.70 L


 


 


 4.35-5.85


10^6/uL


 


Hemoglobin  10.5 L   11.5-16.0  G/DL


 


Hematocrit  32 L   35-52  %


 


Mean Corpuscular Volume  87    80-99  FL


 


Mean Corpuscular Hemoglobin  28    25-34  PG


 


Mean Corpuscular Hemoglobin


Concent 


 33 


 


 


 32-36  G/DL





 


Red Cell Distribution Width  15.3 H   10.0-14.5  %


 


Platelet Count


 


 127 L


 


 


 130-400


10^3/uL


 


Mean Platelet Volume  9.8    7.4-10.4  FL


 


Neutrophils (%) (Auto)  82 H   42-75  %


 


Lymphocytes (%) (Auto)  10 L   12-44  %


 


Monocytes (%) (Auto)  7    0-12  %


 


Eosinophils (%) (Auto)  1    0-10  %


 


Basophils (%) (Auto)  0    0-10  %


 


Neutrophils # (Auto)  2.9    1.8-7.8  X 10^3


 


Lymphocytes # (Auto)  0.4 L   1.0-4.0  X 10^3


 


Monocytes # (Auto)  0.2    0.0-1.0  X 10^3


 


Eosinophils # (Auto)


 


 0.0 


 


 


 0.0-0.3


10^3/uL


 


Basophils # (Auto)


 


 0.0 


 


 


 0.0-0.1


10^3/uL


 


Glucometer   165 H    MG/DL








Radiology


 Left tib/fib x-ray unremarkable





Physical Exam-([facility])


Physical Exam


Vital Signs





                          VS - Last 72 Hours, by Label


POS





 19





 20:26 21:00 21:15 21:30


 


Temp 37.0   


 


Pulse 108 99 95 105


 


Resp 20 18 18 18


 


B/P (MAP) 151/92 (111) 145/89 140/91 156/96


 


Pulse Ox 100 99 100 98


 


O2 Delivery  Room Air Room Air 


 


    





 19





 21:45 22:00 22:15 22:30


 


Temp 36.5   


 


Pulse 104 102 102 101


 


Resp 18 18 18 18


 


B/P (MAP) 131/91 132/93 141/85 135/96


 


Pulse Ox 100 99 99 99


 


O2 Delivery Room Air   Room Air


 


    





 1119





 22:42 00:26 01:47 04:00


 


Temp 36.5  36.7 37.2


 


Pulse 101  102 109


 


Resp 18  18 20


 


B/P (MAP) 135/96 (104)  153/92 161/97 (118)


 


Pulse Ox 99  100 100


 


O2 Delivery Room Air Room Air Room Air Room Air


 


    





 19  





 08:00 12:00  


 


Temp 37.1 37.3  


 


Pulse 107 105  


 


Resp 24 24  


 


B/P (MAP) 164/85 (111) 169/96 (120)  


 


Pulse Ox 96 97  


 


O2 Delivery Room Air Room Air  





Capillary Refill : Less Than 3 Seconds


General Appearance:  no apparent distress


Respiratory:  lungs clear, normal breath sounds


Cardiovascular:  regular rate, rhythm, no murmur


Gastrointestinal:  normal bowel sounds, non tender, soft


Extremities:  no pedal edema


Neurologic/Psychiatric:  normal mood/affect


Skin:  other (both legs with hemosiderin staining to knees, left anterior shin 

with scabbed lesion about 4 cm in diameter without current drainage with mild 

surrounding erythema and peeling skin)





Assessment/Plan


Assessment/Plan


Admission Status:  Observation





(1) Left leg cellulitis


Assessment & Plan:  Started vancomycin and zosyn in ER. Will transition to oral 

when tolerating PO well.





(2) chronic leg wound


Assessment & Plan:  Dr. Cabrera consulted, appreciate recommendations.





(3) Hypertension


Status:  Chronic


Assessment & Plan:  Resume home meds.


Qualifiers:  


   Qualified Codes:  I10 - Essential (primary) hypertension


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


(5) Diabetes


Status:  Chronic


Assessment & Plan:  Sliding scale insulin. Holding metformin.


Qualifiers:  


   


(6) Rheumatoid arthritis


Status:  Chronic


Assessment & Plan:  On methotrexate weekly outpatient.





(7) Methamphetamine use


Status:  Acute


Assessment & Plan:  Getting set up with addiction treatment outpatient at [facility], 

someone to come see her inpatient today.





(8) Chronic hepatitis C


Status:  Chronic


Assessment & Plan:  In the process of getting started on treatment.





(9) Pancytopenia


Status:  Acute


Assessment & Plan:  Suspect related to hepatitis C. Monitor.





(10) Hematuria


Status:  Acute


Assessment & Plan:  Without clear evidence of infection. May need further work-

up outpatient.





(11) DVT prophylaxis


Status:  Acute


Assessment & Plan:  Enoxaparin








Clinical Quality Measures


DVT/VTE Risk/Contraindication:


Risk Factor Score Per Nursin


RFS Level Per Nursing on Admit:  4+=Very High











CLAUDIA BARTON MD             2019 10:13


POS

## 2019-11-13 NOTE — NUR
VANCOMYCIN DOSING



SCR 0.73; CRCL ~ 79; VANC 15 MG/KG X 69 KG ~ 1 GM Q12H

CHECK TROUGH LEVEL 11/14 0900

HOLD DOSE AND CONTACT PHARMACY IF LEVEL IS GREATER THAN 20

## 2019-11-13 NOTE — NUR
CM/SS spoke with the patient to assess for needs.  CM/SS asked the patient and her visitor 
if she needed assistance or help with anything. The patient verbalized that she did not have 
any needs at this time. Will continue to follow to assess for needs.

## 2019-11-13 NOTE — NUR
RD ASSESSMENT 



PMHx: HTN; Hep-C; cellulitis; DM



PT INTERACTION: Pt was awake and pleasant during nutrition assessment. Pt states current 
appetite is pretty poor and has been for the past 2 days. Pt states follwoing a regular diet 
at home, and currently has no difficulties chewing/swallowing food. Pt states recent issues 
with nausea, but not vomiting over the last few days. Pt states episodes of diarrhea during 
this timeframe as well. Note no BM has been recorded and pt not currently on bowel regimen 
per chart review. 



Pt states no recent wt changes. Note unable to determine recent wt hx, per chart review. 



Pt states current DM management is "pretty good" and that she tries to keep her blood sugar 
levels where they should be.



Note pt has wound on left lower leg. 



ABNORMAL NUTRITION-RELATED LAB VALUES:  Cl 111 (H); BUN 19 (H); glu 193 (H); alkphos 171 
(H); Ca 8.1 (L); Pro 5.4 (L); alb 3.0 (L)



Est. kcal needs: 2814-1751 kcal (20-25 kcal/kg) 

Est. Pro needs:  83-98 g Pro (1.2-1.4 g Pro/kg)



PES STATEMENT: Inadequate oral intake (NI-2.1) related to loss of appetite | nausea as 
evidenced by pt interview



Inadequate protein intake (NI-5.6.1) related to increased protein needs as evidenced by 
wounds (lower left leg wound)



INTERVENTION:  

Continue with current diet order of CHO 45g/m 1 snack diet. 

Add Ensure HP to meals BID. Provides 160 kcal and 16 g Pro per serving for perceived benefit 
to wound healing. 



MONITOR/EVALUATE:  

PO Intake; Plan of Care; Hydration Status; Weight Status; Lab Values 





PAM Kat, MS, RD, LD

## 2019-11-14 VITALS — SYSTOLIC BLOOD PRESSURE: 144 MMHG | DIASTOLIC BLOOD PRESSURE: 94 MMHG

## 2019-11-14 VITALS — SYSTOLIC BLOOD PRESSURE: 121 MMHG | DIASTOLIC BLOOD PRESSURE: 55 MMHG

## 2019-11-14 VITALS — SYSTOLIC BLOOD PRESSURE: 112 MMHG | DIASTOLIC BLOOD PRESSURE: 67 MMHG

## 2019-11-14 VITALS — SYSTOLIC BLOOD PRESSURE: 117 MMHG | DIASTOLIC BLOOD PRESSURE: 73 MMHG

## 2019-11-14 VITALS — SYSTOLIC BLOOD PRESSURE: 152 MMHG | DIASTOLIC BLOOD PRESSURE: 100 MMHG

## 2019-11-14 VITALS — DIASTOLIC BLOOD PRESSURE: 61 MMHG | SYSTOLIC BLOOD PRESSURE: 111 MMHG

## 2019-11-14 VITALS — DIASTOLIC BLOOD PRESSURE: 87 MMHG | SYSTOLIC BLOOD PRESSURE: 138 MMHG

## 2019-11-14 LAB
ALBUMIN SERPL-MCNC: 3.1 GM/DL (ref 3.2–4.5)
ALP SERPL-CCNC: 165 U/L (ref 40–136)
ALT SERPL-CCNC: 27 U/L (ref 0–55)
ANISOCYTOSIS BLD QL SMEAR: SLIGHT
BASOPHILS # BLD AUTO: 0 10^3/UL (ref 0–0.1)
BASOPHILS NFR BLD AUTO: 0 % (ref 0–10)
BILIRUB SERPL-MCNC: 1.4 MG/DL (ref 0.1–1)
BUN/CREAT SERPL: 16
CALCIUM SERPL-MCNC: 7.8 MG/DL (ref 8.5–10.1)
CHLORIDE SERPL-SCNC: 110 MMOL/L (ref 98–107)
CO2 SERPL-SCNC: 16 MMOL/L (ref 21–32)
CREAT SERPL-MCNC: 0.67 MG/DL (ref 0.6–1.3)
EOSINOPHIL # BLD AUTO: 0 10^3/UL (ref 0–0.3)
EOSINOPHIL NFR BLD AUTO: 0 % (ref 0–10)
ERYTHROCYTE [DISTWIDTH] IN BLOOD BY AUTOMATED COUNT: 15 % (ref 10–14.5)
GFR SERPLBLD BASED ON 1.73 SQ M-ARVRAT: > 60 ML/MIN
GLUCOSE SERPL-MCNC: 165 MG/DL (ref 70–105)
HCT VFR BLD CALC: 33 % (ref 35–52)
HGB BLD-MCNC: 10.6 G/DL (ref 11.5–16)
LYMPHOCYTES # BLD AUTO: 0.4 X 10^3 (ref 1–4)
LYMPHOCYTES NFR BLD AUTO: 6 % (ref 12–44)
MCH RBC QN AUTO: 28 PG (ref 25–34)
MCHC RBC AUTO-ENTMCNC: 32 G/DL (ref 32–36)
MCV RBC AUTO: 86 FL (ref 80–99)
MONOCYTES # BLD AUTO: 0.5 X 10^3 (ref 0–1)
MONOCYTES NFR BLD AUTO: 7 % (ref 0–12)
MONOCYTES NFR BLD: 5 %
NEUTROPHILS # BLD AUTO: 5.8 X 10^3 (ref 1.8–7.8)
NEUTROPHILS NFR BLD AUTO: 87 % (ref 42–75)
NEUTS BAND NFR BLD MANUAL: 86 %
NEUTS BAND NFR BLD: 7 %
OVALOCYTES BLD QL SMEAR: (no result)
PLATELET # BLD: 150 10^3/UL (ref 130–400)
PMV BLD AUTO: 10 FL (ref 7.4–10.4)
POIKILOCYTOSIS BLD QL SMEAR: SLIGHT
POTASSIUM SERPL-SCNC: 3.9 MMOL/L (ref 3.6–5)
PROT SERPL-MCNC: 5.6 GM/DL (ref 6.4–8.2)
SODIUM SERPL-SCNC: 137 MMOL/L (ref 135–145)
TOXIC GRANULES BLD QL SMEAR: (no result)
VANCOMYCIN TROUGH SERPL-MCNC: 8.2 UG/ML (ref 10–20)
VARIANT LYMPHS NFR BLD MANUAL: 2 %
WBC # BLD AUTO: 6.7 10^3/UL (ref 4.3–11)

## 2019-11-14 RX ADMIN — ENOXAPARIN SODIUM SCH MG: 100 INJECTION SUBCUTANEOUS at 14:45

## 2019-11-14 RX ADMIN — VANCOMYCIN HYDROCHLORIDE SCH MLS/HR: 500 INJECTION, POWDER, LYOPHILIZED, FOR SOLUTION INTRAVENOUS at 10:30

## 2019-11-14 RX ADMIN — NITROGLYCERIN PRN MG: 0.4 TABLET SUBLINGUAL at 08:45

## 2019-11-14 RX ADMIN — SODIUM CHLORIDE SCH MLS/HR: 900 INJECTION, SOLUTION INTRAVENOUS at 11:33

## 2019-11-14 RX ADMIN — CLINDAMYCIN HYDROCHLORIDE SCH MG: 150 CAPSULE ORAL at 23:42

## 2019-11-14 RX ADMIN — HYDROCHLOROTHIAZIDE SCH MG: 25 TABLET ORAL at 08:45

## 2019-11-14 RX ADMIN — GABAPENTIN SCH MG: 100 CAPSULE ORAL at 20:09

## 2019-11-14 RX ADMIN — INSULIN ASPART SCH UNIT: 100 INJECTION, SOLUTION INTRAVENOUS; SUBCUTANEOUS at 21:04

## 2019-11-14 RX ADMIN — LOSARTAN POTASSIUM SCH MG: 50 TABLET, FILM COATED ORAL at 08:45

## 2019-11-14 RX ADMIN — PANTOPRAZOLE SODIUM SCH MG: 40 TABLET, DELAYED RELEASE ORAL at 14:44

## 2019-11-14 RX ADMIN — HYDROCHLOROTHIAZIDE SCH MG: 25 TABLET ORAL at 00:25

## 2019-11-14 RX ADMIN — CELECOXIB SCH MG: 100 CAPSULE ORAL at 08:45

## 2019-11-14 RX ADMIN — SODIUM CHLORIDE SCH MLS/HR: 900 INJECTION, SOLUTION INTRAVENOUS at 05:50

## 2019-11-14 RX ADMIN — Medication SCH APPLIC: at 20:09

## 2019-11-14 RX ADMIN — SODIUM CHLORIDE SCH MLS/HR: 900 INJECTION, SOLUTION INTRAVENOUS at 18:29

## 2019-11-14 RX ADMIN — NITROGLYCERIN PRN MG: 0.4 TABLET SUBLINGUAL at 08:30

## 2019-11-14 RX ADMIN — SODIUM CHLORIDE SCH MLS/HR: 900 INJECTION, SOLUTION INTRAVENOUS at 02:57

## 2019-11-14 RX ADMIN — GABAPENTIN SCH MG: 100 CAPSULE ORAL at 08:45

## 2019-11-14 RX ADMIN — CLINDAMYCIN HYDROCHLORIDE SCH MG: 150 CAPSULE ORAL at 18:28

## 2019-11-14 RX ADMIN — INSULIN ASPART SCH UNIT: 100 INJECTION, SOLUTION INTRAVENOUS; SUBCUTANEOUS at 16:28

## 2019-11-14 RX ADMIN — INSULIN ASPART SCH UNIT: 100 INJECTION, SOLUTION INTRAVENOUS; SUBCUTANEOUS at 05:15

## 2019-11-14 RX ADMIN — INSULIN ASPART SCH UNIT: 100 INJECTION, SOLUTION INTRAVENOUS; SUBCUTANEOUS at 11:12

## 2019-11-14 RX ADMIN — FOLIC ACID SCH MG: 1 TABLET ORAL at 08:45

## 2019-11-14 NOTE — CONSULTATION-CARDIOLOGY
HPI-Cardiology


Cardiology Consultation


Date of Consultation


19


Date of Admission





Time Seen by Provider:  12:40


Indication:  Chest pain





HPI


59-year-old lady with history of diabetes mellitus, hypertension hyperlipidemia,

had nonhealing wound in her left leg for the past few months, she was seen and 

treated at  in September with no full recovery.  Still an active smoker.  Was 

in the hospital receiving antibiotic when she had sudden onset of chest pain 

described it as dull in nature in the retrosternal area radiating to left arm.  

Resolved at this time, troponin was mildly elevated.  Currently not having any 

chest pain.  No shortness of breath.  No syncope or near syncopal episodes





Home Medications & Allergies


Allergies:  


Coded Allergies:  


     hydrocodone (Verified  Allergy, Unknown, 1/10/19)


Home Medication List Reviewed:  Yes





PMH-Social-Family Hx


Patient Social History


Marital Status:  


Alcohol Use:  Denies Use


Recreational Drug Use:  Yes (+ IV METH USE)


Drug of Choice:  + IV METH USE


Smoking Status:  Current Everyday Smoker (1 PPD)


Type Used:  Cigarettes (1 PPD)


2nd Hand Smoke Exposure:  Yes


Recent Foreign Travel:  No


Recent Infectious Disease Expo:  No


Recent Hopitalizations:  No





Immunizations Up To Date


Tetanus Booster (TDap):  More than 5yrs ()


Date of Influenza Vaccine:  Oct 1, 2019





Past Medical History


Discussed below





Family Medical History


Significant Family History:  Heart Disease, Cancer (lung), Diabetes


Family Medical Hx


Strong family history of heart disease





Review of Systems-General


Review of Systems


Constitutional:  see HPI


EENTM:  see HPI, no symptoms reported


Respiratory:  see HPI; No cough, No dyspnea on exertion, No hemoptysis, No 

orthopnea, No phlegm, No short of breath, No stridor, No wheezing, No other


Cardiovascular:  see HPI, chest pain; No edema, No Hx of Intervention, No 

palpitations, No syncope, No vascular heart diseas, No other


Gastrointestinal:  no symptoms reported, see HPI


Genitourinary:  no symptoms reported, see HPI


Musculoskeletal:  see HPI


Skin:  see HPI, other (Ulceration on the left leg)


Psychiatric/Neurological:  No Symptoms Reported, See HPI





Reviewed Test Results


Reviewed Test Results


Lab





Laboratory Tests








Test


 19


16:19 19


20:48 19


05:12 19


09:00 Range/Units


 


 


Glucometer 192 H 151 H 157 H    MG/DL


 


White Blood Count


 


 


 


 6.7 


 4.3-11.0


10^3/uL


 


Red Blood Count


 


 


 


 3.80 L


 4.35-5.85


10^6/uL


 


Hemoglobin    10.6 L 11.5-16.0  G/DL


 


Hematocrit    33 L 35-52  %


 


Mean Corpuscular Volume    86  80-99  FL


 


Mean Corpuscular Hemoglobin    28  25-34  PG


 


Mean Corpuscular Hemoglobin


Concent 


 


 


 32 


 32-36  G/DL





 


Red Cell Distribution Width    15.0 H 10.0-14.5  %


 


Platelet Count


 


 


 


 150 


 130-400


10^3/uL


 


Mean Platelet Volume    10.0  7.4-10.4  FL


 


Neutrophils (%) (Auto)    87 H 42-75  %


 


Lymphocytes (%) (Auto)    6 L 12-44  %


 


Monocytes (%) (Auto)    7  0-12  %


 


Eosinophils (%) (Auto)    0  0-10  %


 


Basophils (%) (Auto)    0  0-10  %


 


Neutrophils # (Auto)    5.8  1.8-7.8  X 10^3


 


Lymphocytes # (Auto)    0.4 L 1.0-4.0  X 10^3


 


Monocytes # (Auto)    0.5  0.0-1.0  X 10^3


 


Eosinophils # (Auto)


 


 


 


 0.0 


 0.0-0.3


10^3/uL


 


Basophils # (Auto)


 


 


 


 0.0 


 0.0-0.1


10^3/uL


 


Neutrophils % (Manual)    86   %


 


Lymphocytes % (Manual)    2   %


 


Monocytes % (Manual)    5   %


 


Band Neutrophils    7   %


 


Toxic Granulation    2+   


 


Poikilocytosis    SLIGHT   


 


Anisocytosis    SLIGHT   


 


Elliptocytes    MODERATE   


 


Sodium Level    137  135-145  MMOL/L


 


Potassium Level    3.9  3.6-5.0  MMOL/L


 


Chloride Level    110 H   MMOL/L


 


Carbon Dioxide Level    16 L 21-32  MMOL/L


 


Anion Gap    11  5-14  MMOL/L


 


Blood Urea Nitrogen    11  7-18  MG/DL


 


Creatinine


 


 


 


 0.67 


 0.60-1.30


MG/DL


 


Estimat Glomerular Filtration


Rate 


 


 


 > 60 


  





 


BUN/Creatinine Ratio    16   


 


Glucose Level    165 H   MG/DL


 


Calcium Level    7.8 L 8.5-10.1  MG/DL


 


Corrected Calcium    8.5  8.5-10.1  MG/DL


 


Total Bilirubin    1.4 H 0.1-1.0  MG/DL


 


Aspartate Amino Transf


(AST/SGOT) 


 


 


 27 


 5-34  U/L





 


Alanine Aminotransferase


(ALT/SGPT) 


 


 


 27 


 0-55  U/L





 


Alkaline Phosphatase    165 H   U/L


 


Troponin I    0.083 H <0.028  NG/ML


 


Total Protein    5.6 L 6.4-8.2  GM/DL


 


Albumin    3.1 L 3.2-4.5  GM/DL


 


Vancomycin Level Trough


 


 


 


 8.2 L


 10.0-20.0


UG/ML


 


Test


 19


10:49 


 


 


 Range/Units


 


 


Glucometer 156 H      MG/DL








Radiology


 Left tib/fib x-ray unremarkable





Physical Exam


Physical Exam


Vital Signs





Vital Signs - First Documented








 19





 20:26 21:00 12:00


 


Temp 37.0  


 


Pulse 108  


 


Resp 20  


 


B/P (MAP) 151/92 (111)  


 


Pulse Ox 100  


 


O2 Delivery  Room Air 


 


O2 Flow Rate   2.00





Capillary Refill : Less Than 3 Seconds


Height, Weight, BMI


Height: 5'3.00"


Weight: 159lbs. 0oz. 72.990678ha; 27.22 BMI


Method:Stated


General Appearance:  No Apparent Distress, WD/WN


Eyes:  Bilateral Eye Normal Inspection, Bilateral Eye PERRL, Bilateral Eye EOMI


HEENT:  PERRL/EOMI, TMs Normal, Normal ENT Inspection, Pharynx Normal, Moist 

Mucous Membranes


Neck:  Full Range of Motion, Normal Inspection, Non Tender, Supple, Carotid 

Bruit


Respiratory:  Chest Non Tender, Normal Breath Sounds, No Accessory Muscle Use, 

No Respiratory Distress


Cardiovascular:  Regular Rate, Rhythm, No Edema, No Gallop, No JVD, No Murmur, 

Normal Peripheral Pulses


Gastrointestinal:  Normal Bowel Sounds, No Organomegaly, No Pulsatile Mass, Non 

Tender, Soft


Back:  Normal Inspection, No CVA Tenderness, No Vertebral Tenderness


Extremity:  Normal Capillary Refill, Normal Inspection, Normal Range of Motion, 

Non Tender, No Calf Tenderness, No Pedal Edema


Neurologic/Psychiatric:  Alert, Oriented x3, No Motor/Sensory Deficits, Normal 

Mood/Affect


Skin:  Normal Color, Warm/Dry


Lymphatic:  No Adenopathy





A/P-Cardiology


Admission Diagnosis


Chest pain


Coronary artery disease


Hypertension


Hyperlipidemia





Assessment/Plan


Chest pain nonspecific etiology resulting in angina, mild elevation in troponin,

I will start aspirin, monitor troponin level.  Educated on risk factors for 

coronary artery disease next





Coronary artery disease, continue to monitor, if troponin persisted to be 

elevated or rise I would consider cardiac catheterization otherwise we'll 

monitor and planning for stress test as an outpatient





Nonhealing leg ulcer, had a trauma in December, normal DIANNE, seen in , 

currently being treated and healing slowly





Hypertension, continue home medication monitor blood pressure next





Hyperlipidemia, monitor lipids





Diabetes mellitus, followed and managed by primary care physician next





Tobaccoism, educated on smoking cessation





Strong family history of atherosclerosis





History of methamphetamine use, educated on avoiding illicit drugs





Clinical Quality Measures


DVT/VTE Risk/Contraindication:


Risk Factor Score Per Nursin


RFS Level Per Nursing on Admit:  4+=Very High











KRYSTAL TAI MD              2019 12:43


POS

## 2019-11-14 NOTE — NUR
Dr. Barton notified of bp at 152/100 with hr at 105.  New order rec to continue home med of 
HCTZ 25mg po daily and give one dose now.  Will continue to monitor.

## 2019-11-14 NOTE — WOUND CARE ASSESSMENT
Wound Care Assessment


Date Seen by Provider:  Nov 14, 2019


Time Seen by Provider:  17:20


Chief Complaint


Ulcer L calf.


HPI


The patient is a 59 year old female with infected L calf ulcer in a complex 

setting of vasculitis, uncontrolled diabetes, tobacco abuse, ?Venous 

insufficiency?, and potential nutritional issues. Arterial studies ordered. 





11/14/19 Interval Note:  The wound appears much improved.  There is a dry scab. 

Silvasorb dressings ordered.  Will follow-up as out-patient post discharge.


Smoking Status:  Current Everyday Smoker (1 PPD)


Recreational Drug Use:  Yes (+ IV METH USE)


Alcohol Use:  Denies Use


Exam





Vital Signs








  Date Time  Temp Pulse Resp B/P (MAP) Pulse Ox O2 Delivery O2 Flow Rate FiO2


 


11/14/19 16:00 37.5 98 18 117/73 (88) 98 Room Air  


 


11/14/19 12:00       2.00 





Capillary Refill : Less Than 3 Seconds





Results


Laboratory Tests


11/13/19 20:48: Glucometer 151H


11/14/19 05:12: Glucometer 157H


11/14/19 09:00: 


White Blood Count 6.7, Red Blood Count 3.80L, Hemoglobin 10.6L, Hematocrit 33L, 

Mean Corpuscular Volume 86, Mean Corpuscular Hemoglobin 28, Mean Corpuscular 

Hemoglobin Concent 32, Red Cell Distribution Width 15.0H, Platelet Count 150, 

Mean Platelet Volume 10.0, Neutrophils (%) (Auto) 87H, Lymphocytes (%) (Auto) 6L

, Monocytes (%) (Auto) 7, Eosinophils (%) (Auto) 0, Basophils (%) (Auto) 0, 

Neutrophils # (Auto) 5.8, Lymphocytes # (Auto) 0.4L, Monocytes # (Auto) 0.5, 

Eosinophils # (Auto) 0.0, Basophils # (Auto) 0.0, Neutrophils % (Manual) 86, 

Lymphocytes % (Manual) 2, Monocytes % (Manual) 5, Band Neutrophils 7, Toxic 

Granulation 2+, Poikilocytosis SLIGHT, Anisocytosis SLIGHT, Elliptocytes 

MODERATE, Sodium Level 137, Potassium Level 3.9, Chloride Level 110H, Carbon 

Dioxide Level 16L, Anion Gap 11, Blood Urea Nitrogen 11, Creatinine 0.67, 

Estimat Glomerular Filtration Rate > 60, BUN/Creatinine Ratio 16, Glucose Level 

165H, Calcium Level 7.8L, Corrected Calcium 8.5, Total Bilirubin 1.4H, Aspartate

Amino Transf (AST/SGOT) 27, Alanine Aminotransferase (ALT/SGPT) 27, Alkaline 

Phosphatase 165H, Troponin I 0.083H, Total Protein 5.6L, Albumin 3.1L, 

Vancomycin Level Trough 8.2L


11/14/19 10:49: Glucometer 156H


11/14/19 16:25: Glucometer 196H





Microbiology


11/12/19 Blood Culture - Preliminary, Resulted


           No growth





Microbiology


11/12/19 Blood Culture - Preliminary, Resulted


           No growth


11/12/19 Blood Culture - Preliminary, Resulted


           No growth











IRASEMA ASTORGA MD             Nov 14, 2019 18:14


POS

## 2019-11-14 NOTE — NUR
Pt given SL nitro x2, pain has decreased from 8 to 4 out of 10.  Morning meds given, pt 
asked to hold up on eating or drinking until seen by physician.

## 2019-11-14 NOTE — NUR
VANCOMYCIN DOSING



TROUGH LEVEL 8.2 - GIVE ADDITIONAL DOSE OF VANC 500 MG (TO MAKE 1500 MG TOTAL) THEN CHANGE 
DOSE TO VANC 1500 MG Q12H

CHECK LEVEL 11/15 0900 BEFORE 3RD DOSE

HOLD DOSE AND CONTACT PHARMACY IF LEVEL IS GREATER THAN 20

## 2019-11-14 NOTE — PROGRESS NOTE
Subjective


Subjective/Events-last exam


Seen at 1030 am. Had an episode of pressure like chest pain this morning with 

diaphoresis. EKG at the time was unremarkable, but troponin slightly elevated. 

She is feeling well now, denies concerns.





Focused Exam


Lactate Level


19 21:15: Lactic Acid Level 1.68





Objective


Exam


Last Set of Vital Signs





Vital Signs








  Date Time  Temp Pulse Resp B/P (MAP) Pulse Ox O2 Delivery O2 Flow Rate FiO2


 


19 12:00 35.4 91 24 112/67 (82) 98 Nasal Cannula 2.00 





Capillary Refill : Less Than 3 Seconds


I&O











Intake and Output 


 


 19





 23:59


 


Intake Total 2400 ml


 


Balance 2400 ml


 


 


 


Intake Oral 1760 ml


 


IV Total 640 ml


 


# Voids 4


 


Daily Weight Change No





 No








General:  Alert, No Acute Distress


Lungs:  Clear to Auscultation, Normal Air Movement


Heart:  Regular Rate, No Murmurs


Abdomen:  Normal Bowel Sounds, Soft


Skin:  Other (hemosiderin staining of both legs to knees, mild erythema 

surrounding scabbed lesion on left anterior shin)


Neuro:  Normal Speech





Results/Procedures


Lab


Laboratory Tests


19 20:48: Glucometer 151H


19 05:12: Glucometer 157H


19 09:00: 


White Blood Count 6.7, Red Blood Count 3.80L, Hemoglobin 10.6L, Hematocrit 33L, 

Mean Corpuscular Volume 86, Mean Corpuscular Hemoglobin 28, Mean Corpuscular 

Hemoglobin Concent 32, Red Cell Distribution Width 15.0H, Platelet Count 150, 

Mean Platelet Volume 10.0, Neutrophils (%) (Auto) 87H, Lymphocytes (%) (Auto) 6L

, Monocytes (%) (Auto) 7, Eosinophils (%) (Auto) 0, Basophils (%) (Auto) 0, 

Neutrophils # (Auto) 5.8, Lymphocytes # (Auto) 0.4L, Monocytes # (Auto) 0.5, 

Eosinophils # (Auto) 0.0, Basophils # (Auto) 0.0, Neutrophils % (Manual) 86, 

Lymphocytes % (Manual) 2, Monocytes % (Manual) 5, Band Neutrophils 7, Toxic 

Granulation 2+, Poikilocytosis SLIGHT, Anisocytosis SLIGHT, Elliptocytes 

MODERATE, Sodium Level 137, Potassium Level 3.9, Chloride Level 110H, Carbon 

Dioxide Level 16L, Anion Gap 11, Blood Urea Nitrogen 11, Creatinine 0.67, 

Estimat Glomerular Filtration Rate > 60, BUN/Creatinine Ratio 16, Glucose Level 

165H, Calcium Level 7.8L, Corrected Calcium 8.5, Total Bilirubin 1.4H, Aspartate

Amino Transf (AST/SGOT) 27, Alanine Aminotransferase (ALT/SGPT) 27, Alkaline 

Phosphatase 165H, Troponin I 0.083H, Total Protein 5.6L, Albumin 3.1L, 

Vancomycin Level Trough 8.2L


19 10:49: Glucometer 156H





Microbiology


19 Blood Culture - Preliminary, Resulted


           No growth


Radiology


 Left tib/fib x-ray unremarkable





Assessment/Plan


Assessment/Plan





(1) Left leg cellulitis


Assessment & Plan:  Started vancomycin and zosyn in ER. Will transition to oral 

when tolerating PO well.


 change to PO clindamycin. Blood cx with no growth so far.





(2) chronic leg wound


Assessment & Plan:  Dr. Cabrera consulted, appreciate recommendations.





(3) Hypertension


Status:  Chronic


Assessment & Plan:  Resume home meds.


Qualifiers:  


   Qualified Codes:  I10 - Essential (primary) hypertension


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


(5) Diabetes


Status:  Chronic


Assessment & Plan:  Sliding scale insulin. Holding metformin.


Qualifiers:  


   


(6) Rheumatoid arthritis


Status:  Chronic


Assessment & Plan:  On methotrexate weekly outpatient.





(7) Methamphetamine use


Status:  Acute


Assessment & Plan:  Getting set up with addiction treatment outpatient at [facility], 

someone to come see her inpatient.





(8) Chronic hepatitis C


Status:  Chronic


Assessment & Plan:  In the process of getting started on treatment.





(9) Pancytopenia


Status:  Acute


Assessment & Plan:  Suspect related to hepatitis C. Monitor.





(10) Hematuria


Status:  Acute


Assessment & Plan:  Without clear evidence of infection. May need further work-

up outpatient.





(11) DVT prophylaxis


Status:  Acute


Assessment & Plan:  Enoxaparin





(12) Chest pain


Status:  Acute


Assessment & Plan:  EKG okay, troponin slight elevation, Cardiology consulted, 

appreciate recommendations.








Clinical Quality Measures


DVT/VTE Risk/Contraindication:


Risk Factor Score Per Nursin


RFS Level Per Nursing on Admit:  4+=Very High











CLAUDIA SCHUMACHER MD             2019 16:27


POS

## 2019-11-14 NOTE — NUR
Dr. Barton notified of pt's complaint of CP (sharp, substernal) 8 out of 10, STAT EKG 
ordered, pt placed on 2L O2.  Order for Nitro SL placed.

## 2019-11-15 VITALS — DIASTOLIC BLOOD PRESSURE: 72 MMHG | SYSTOLIC BLOOD PRESSURE: 150 MMHG

## 2019-11-15 VITALS — SYSTOLIC BLOOD PRESSURE: 117 MMHG | DIASTOLIC BLOOD PRESSURE: 74 MMHG

## 2019-11-15 VITALS — DIASTOLIC BLOOD PRESSURE: 73 MMHG | SYSTOLIC BLOOD PRESSURE: 117 MMHG

## 2019-11-15 VITALS — DIASTOLIC BLOOD PRESSURE: 67 MMHG | SYSTOLIC BLOOD PRESSURE: 104 MMHG

## 2019-11-15 LAB
ALBUMIN SERPL-MCNC: 2.7 GM/DL (ref 3.2–4.5)
ALP SERPL-CCNC: 139 U/L (ref 40–136)
ALT SERPL-CCNC: 20 U/L (ref 0–55)
BILIRUB SERPL-MCNC: 0.6 MG/DL (ref 0.1–1)
BUN/CREAT SERPL: 14
CALCIUM SERPL-MCNC: 7.6 MG/DL (ref 8.5–10.1)
CHLORIDE SERPL-SCNC: 111 MMOL/L (ref 98–107)
CO2 SERPL-SCNC: 17 MMOL/L (ref 21–32)
CREAT SERPL-MCNC: 1.57 MG/DL (ref 0.6–1.3)
ERYTHROCYTE [DISTWIDTH] IN BLOOD BY AUTOMATED COUNT: 15 % (ref 10–14.5)
GFR SERPLBLD BASED ON 1.73 SQ M-ARVRAT: 34 ML/MIN
GLUCOSE SERPL-MCNC: 133 MG/DL (ref 70–105)
HCT VFR BLD CALC: 30 % (ref 35–52)
HGB BLD-MCNC: 9.7 G/DL (ref 11.5–16)
MCH RBC QN AUTO: 28 PG (ref 25–34)
MCHC RBC AUTO-ENTMCNC: 33 G/DL (ref 32–36)
MCV RBC AUTO: 87 FL (ref 80–99)
PLATELET # BLD: 124 10^3/UL (ref 130–400)
PMV BLD AUTO: 11.1 FL (ref 7.4–10.4)
POTASSIUM SERPL-SCNC: 3.6 MMOL/L (ref 3.6–5)
PROT SERPL-MCNC: 5.1 GM/DL (ref 6.4–8.2)
SODIUM SERPL-SCNC: 139 MMOL/L (ref 135–145)
WBC # BLD AUTO: 3.7 10^3/UL (ref 4.3–11)

## 2019-11-15 PROCEDURE — 4A023N7 MEASUREMENT OF CARDIAC SAMPLING AND PRESSURE, LEFT HEART, PERCUTANEOUS APPROACH: ICD-10-PCS | Performed by: INTERNAL MEDICINE

## 2019-11-15 PROCEDURE — B2111ZZ FLUOROSCOPY OF MULTIPLE CORONARY ARTERIES USING LOW OSMOLAR CONTRAST: ICD-10-PCS | Performed by: INTERNAL MEDICINE

## 2019-11-15 RX ADMIN — SODIUM CHLORIDE SCH MLS/HR: 900 INJECTION, SOLUTION INTRAVENOUS at 11:26

## 2019-11-15 RX ADMIN — LOSARTAN POTASSIUM SCH MG: 50 TABLET, FILM COATED ORAL at 11:24

## 2019-11-15 RX ADMIN — SODIUM CHLORIDE SCH MLS/HR: 900 INJECTION, SOLUTION INTRAVENOUS at 01:53

## 2019-11-15 RX ADMIN — ENOXAPARIN SODIUM SCH MG: 100 INJECTION SUBCUTANEOUS at 14:44

## 2019-11-15 RX ADMIN — Medication SCH APPLIC: at 11:25

## 2019-11-15 RX ADMIN — CELECOXIB SCH MG: 100 CAPSULE ORAL at 11:24

## 2019-11-15 RX ADMIN — HYDROCHLOROTHIAZIDE SCH MG: 25 TABLET ORAL at 11:25

## 2019-11-15 RX ADMIN — CLINDAMYCIN HYDROCHLORIDE SCH MG: 150 CAPSULE ORAL at 06:04

## 2019-11-15 RX ADMIN — SODIUM CHLORIDE SCH MLS/HR: 900 INJECTION, SOLUTION INTRAVENOUS at 11:52

## 2019-11-15 RX ADMIN — GABAPENTIN SCH MG: 100 CAPSULE ORAL at 11:25

## 2019-11-15 RX ADMIN — CLINDAMYCIN HYDROCHLORIDE SCH MG: 150 CAPSULE ORAL at 15:01

## 2019-11-15 RX ADMIN — PANTOPRAZOLE SODIUM SCH MG: 40 TABLET, DELAYED RELEASE ORAL at 11:25

## 2019-11-15 RX ADMIN — FOLIC ACID SCH MG: 1 TABLET ORAL at 11:25

## 2019-11-15 RX ADMIN — INSULIN ASPART SCH UNIT: 100 INJECTION, SOLUTION INTRAVENOUS; SUBCUTANEOUS at 13:49

## 2019-11-15 RX ADMIN — INSULIN ASPART SCH UNIT: 100 INJECTION, SOLUTION INTRAVENOUS; SUBCUTANEOUS at 06:01

## 2019-11-15 NOTE — NUR
CALLED DR TAI TO INQUIRE ABOUT NPO STATUS AND POSSIBLE STRESS TEST,  HE IS NOT GOING TO DO 
THE STRESS TEST. ITIS OKAY FOR THE PATIENT TO EAT AND BE DISCHARGED HOMES.

## 2019-11-15 NOTE — DISCHARGE SUMMARY
Discharge Summary


Hospital Course


Problems Reviewed?:  Yes


Problems/Diagnosis:  


(1) Left leg cellulitis


Assessment & Plan:  Started vancomycin and zosyn in ER. Will transition to oral 

when tolerating PO well.


 change to PO clindamycin. Blood cx with no growth so far.





(2) chronic leg wound


Assessment & Plan:  Dr. Cabrera consulted, appreciate recommendations. Silvasorb 

ordered. Will follow up with Dr. Cabrera outpatient.





(3) Hypertension


Status:  Chronic


Assessment & Plan:  Resumed home meds.


Qualifiers:  


   Qualified Codes:  I10 - Essential (primary) hypertension


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


(5) Diabetes


Status:  Chronic


Assessment & Plan:  Sliding scale insulin. Holding metformin.


Metformin remains on hold at d/c due to elevated creatinine.


Qualifiers:  


   


(6) Rheumatoid arthritis


Status:  Chronic


Assessment & Plan:  On methotrexate weekly outpatient.





(7) Methamphetamine use


Status:  Acute


Assessment & Plan:  Getting set up with addiction treatment outpatient at [facility], 

someone to come see her inpatient.





(8) Chronic hepatitis C


Status:  Chronic


Assessment & Plan:  In the process of getting started on treatment.





(9) Pancytopenia


Status:  Acute


Assessment & Plan:  Suspect related to hepatitis C. Monitor.





(10) Hematuria


Status:  Acute


Assessment & Plan:  Without clear evidence of infection. May need further work-

up outpatient.





(11) Chest pain


Status:  Acute


Assessment & Plan:  EKG okay, troponin slight elevation, Cardiology consulted, 

appreciate recommendations. Started on ASA, troponin decreased and no further 

chest pain. Echo 19 showed PFO, grade 1 diastolic dysfunction and normal 

systolic function.





(12) Elevated serum creatinine


Status:  Acute


Assessment & Plan:  Perhaps secondary to vancomycin use, recheck outpatient. 

Hold metformin and celebrex.





Hospital Course


Date of Admission: 2019 at 21:55 


Admission Diagnosis :  





Family Physician/Provider: Jenaro Schuler MD  





Date of Discharge: 11/15/19 


Discharge Diagnosis: 


See problem list








Hospital Course:


See problem list














Labs and Pending Lab Test:


Laboratory Tests


19 09:00: 


White Blood Count 6.7, Red Blood Count 3.80L, Hemoglobin 10.6L, Hematocrit 33L, 

Mean Corpuscular Volume 86, Mean Corpuscular Hemoglobin 28, Mean Corpuscular 

Hemoglobin Concent 32, Red Cell Distribution Width 15.0H, Platelet Count 150, 

Mean Platelet Volume 10.0, Neutrophils (%) (Auto) 87H, Lymphocytes (%) (Auto) 6L

, Monocytes (%) (Auto) 7, Eosinophils (%) (Auto) 0, Basophils (%) (Auto) 0, 

Neutrophils # (Auto) 5.8, Lymphocytes # (Auto) 0.4L, Monocytes # (Auto) 0.5, 

Eosinophils # (Auto) 0.0, Basophils # (Auto) 0.0, Neutrophils % (Manual) 86, 

Lymphocytes % (Manual) 2, Monocytes % (Manual) 5, Band Neutrophils 7, Toxic 

Granulation 2+, Poikilocytosis SLIGHT, Anisocytosis SLIGHT, Elliptocytes 

MODERATE, Sodium Level 137, Potassium Level 3.9, Chloride Level 110H, Carbon 

Dioxide Level 16L, Anion Gap 11, Blood Urea Nitrogen 11, Creatinine 0.67, 

Estimat Glomerular Filtration Rate > 60, BUN/Creatinine Ratio 16, Glucose Level 

165H, Calcium Level 7.8L, Corrected Calcium 8.5, Total Bilirubin 1.4H, Aspartate

Amino Transf (AST/SGOT) 27, Alanine Aminotransferase (ALT/SGPT) 27, Alkaline 

Phosphatase 165H, Troponin I 0.083H, Total Protein 5.6L, Albumin 3.1L, 

Vancomycin Level Trough 8.2L


19 10:49: Glucometer 156H


19 16:25: Glucometer 196H


19 20:52: Glucometer 155H


11/15/19 05:20: 


White Blood Count 3.7L, Red Blood Count 3.41L, Hemoglobin 9.7L, Hematocrit 30L, 

Mean Corpuscular Volume 87, Mean Corpuscular Hemoglobin 28, Mean Corpuscular 

Hemoglobin Concent 33, Red Cell Distribution Width 15.0H, Platelet Count 124L, 

Mean Platelet Volume 11.1H, Sodium Level 139, Potassium Level 3.6, Chloride 

Level 111H, Carbon Dioxide Level 17L, Anion Gap 11, Blood Urea Nitrogen 22H, 

Creatinine 1.57H, Estimat Glomerular Filtration Rate 34, BUN/Creatinine Ratio 

14, Glucose Level 133H, Calcium Level 7.6L, Corrected Calcium 8.6, Total 

Bilirubin 0.6, Aspartate Amino Transf (AST/SGOT) 20, Alanine Aminotransferase 

(ALT/SGPT) 20, Alkaline Phosphatase 139H, Troponin I 0.038H, Total Protein 5.1L,

Albumin 2.7L


11/15/19 05:55: Glucometer 140H





Microbiology


19 Blood Culture - Preliminary, Resulted


           No growth





Home Meds


Active


Silvasorb (Silver) 480 Ml Gel.er.ml. 0 Oz TP BID


Aspirin EC (Aspirin) 81 Mg Tablet.dr 81 Mg PO DAILY


Clindamycin HCl 150 Mg Capsule 300 Mg PO Q6HR 5 Days


Reported


Fish Oil 1,000 mg Capsule (Omega 3 Polyunsat Fatty Acids) 1,000 Mg Cap 1,000 Mg 

PO DAILY


Hydrochlorothiazide 25 Mg Tablet 25 Mg PO DAILY


     LAST FILLED #30 19


Folic Acid 1 Mg Tablet 1 Mg PO DAILY


     LAST FILLED #30 19


Methotrexate (Methotrexate Sodium) 2.5 Mg Tablet 10 Tab PO SA


     LAST FILLED #16 19


     TAKES 4 (2.5MG) TABLETS


Metformin HCl 500 Mg Tablet 500 Mg PO 1800


     LAST FILLED #30 19


Gabapentin 100 Mg Capsule 100 Mg PO BID


Celecoxib 200 Mg Capsule 200 Mg PO DAILY


Losartan Potassium 50 Mg Tablet 50 Mg PO DAILY


Potassium Chloride 10 Meq Tab.er.prt 10 Meq PO DAILY


Assessment/Pt DC Instructions


Follow up with Dr. Schuler on  at 11:40 am.


Follow up with Dr. Cabrera as directed.


You need to get labs drawn on Monday to recheck your kidney function. Until you 

get lab results, do not take metformin or celebrex.





Orders-Post D/C & Referrals


Pneu Vac Indicated:  Yes





Discharge Physical Examination


Allergies:  


Coded Allergies:  


     hydrocodone (Verified  Allergy, Unknown, 1/10/19)


General Appearance:  No Apparent Distress


Respiratory:  Lungs Clear


Cardiovascular:  Regular Rate, Rhythm, No Murmur


Gastrointestinal:  Normal Bowel Sounds, Soft


Extremity:  Other (hemosiderin staining to both lower legs, left lower leg 

wrapped in gauze with no drainage.)


Neurologic/Psychiatric:  Alert, Normal Mood/Affect





Copy


Copies To 1:   JENARO SCHULER MD





Discharge Summary


Date of Admission


2019 at 21:55


Date of Discharge








Clinical Quality Measures


DVT/VTE Risk/Contraindication:


Risk Factor Score Per Nursin


RFS Level Per Nursing on Admit:  4+=Very High











CLAUDIA SCHUMACHER MD             Nov 15, 2019 08:38


POS

## 2019-11-15 NOTE — NUR
Attempted visit by Chaplain Nicole Torres: Pastoral department will follow up as able and/or 
requested for support. Pt is registered as [employer].

## 2019-11-15 NOTE — CARDIAC PROCEDURE NOTE-CS/ASA
Pre-Procedure Note


Pre-Op Procedure Note


H&P Reviewed


The H&P was reviewed, patient examined and no changes noted.


Date H&P Reviewed:  Nov 15, 2019


Time H&P Reviewed:  10:43





Conscious Sedation Pre-Proced


Time


10:43





ASA Score


3


For ASA 3 and 4: Consider anesthesia and medical clearance. Also, for patients

with a history of failed moderate sedation consider anesthesia.

















Airway 


 


Lungs 


 


Heart 


 


 ASA score


 


 ASA 1: a normal healthy patient


 


 ASA 2:  a patient with a mild systemic disease (mid diabetes, controlled 

hypertension, obesity 


 


x ASA 3:  a patient with a severe systemic disease that limits activity  

(angina, COPD, prior Myocardial infarction)


 


 ASA 4:  a patient with an incapacitating disease that is a constant threat to 

life (CHF, renal failure)


 


 ASA 5:  a moribund patient not expected to survive 24 hrs.  (ruptured aneurysm)


 


 ASA 6:  a declared brain-dead patient whose organs are being harvested.


 


 For emergent operations, add the letter E after the classification











Mallampati Classification


Grade 3





Sedation Plan


Analgesia, Amnesia, Plan communicated to team members, Discussed options with 

patient/fam, Discussed risks with patient/fam


The patient is an appropriate candidate to undergo the planned procedure, 

sedation, and anesthesia.





The patient immediately re-assessed prior to indication.











KRYSTAL TAI MD              Nov 15, 2019 10:43


POS
yes

## 2019-11-15 NOTE — CARDIAC CATH REPORT
Cardiac Cath Report


Physician (s)/Assistant (s)


Physician


KRYSTAL TAI MD





Pre-Procedure Diagnosis


Pre-Procedure Diagnosis:  Coronary artery disease





Post-Procedure Note


Procedure Start Date:  Nov 15, 2019


Name of Procedure:  


Left heart catheterization


Findings/Procedure Note


PROCEDURE NOTE:


59-year-old lady admitted with cellulitis, had elevation in troponin and chest 

pain, mild renal insufficiency, received IV fluid and brought for cardiac 

catheterization possible PTCA.


After explaining the procedure to the patient, all pros and cons were explained,

all questions were answered.  The patient signed the consent and then she  was 

placed on the cardiac catheterization laboratory. Groin was prepped SL fashion 

local anesthesia was used. Sheath placed in the right femoral artery. [place] 

right and left catheter were used to access the coronary system. JR catheter was

prolapsed into the left ventricular cavity pressure was measured pullback LV to 

aorta was done, no left ventriculogram was done.


At the end of the procedure the sheath was removed. Closure device





FINDINGS:





Hemodynamics 


/20, end-diastolic pressure of 20


Aorta 115/70 mean of 92





ANATOMY:


Left Main is free of obstructive disease


Left Anterior Descending has 40-50 percent stenosis proximally nonobstructive 

disease


Left Circumflex has mild disease nonobstructive disease


Right Coronory Artery is dominant artery with moderate disease at the distal PDA

nonobstructive disease


LV Gram was not done, pressure was measured





CONCLUSION:


1.  Mild-to-moderate coronary artery disease nonobstructive disease


2.  Normal left ventricular end-diastolic pressure





DISCUSSION AND RECOMMENDATION:


troponin elevation is probably due to small vessel disease.  No intervention is 

warranted.  Medical therapy is recommended


Anesthesia Type:  Conscious Sedation


Estimated blood loss (mL):  10 ml


Contrast Amount:  25 ml


Total Radiation Dose:  286 mGy





Post-Procedure Diagnosis


Post-operative diagnosis:  


Chest pain


Coronary artery disease


Cellulitis


Hypertension











KRYSTAL TAI MD              Nov 15, 2019 12:00


POS

## 2019-11-15 NOTE — CARDIOLOGY PROGRESS NOTE
Subjective


Date Seen by Provider:  Nov 15, 2019


Time Seen by Provider:  10:41


Subjective/Events-last exam


Patient is in bed, feeling better, no new complaint


Review of Systems


General:  No Chills, No Night Sweats, No Fatigue, No Malaise, No Appetite, No 

Other


HEENT:  No Head Aches, No Visual Changes, No Eye Pain, No Ear Pain, No 

Dysphasia, No Sinus Congestion, No Post Nasal Drip, No Sore Throat, No Other


Pulmonary:  No Dyspnea, No Cough, No Pleuritic Chest Pain, No Other


Cardiovascular:  No: Chest Pain, Palpitations, Orthopnea, Paroxysmal Noc. 

Dyspnea, Edema, Lt Headedness, Other





Focused Exam


Lactate Level


19 21:15: Lactic Acid Level 1.68








Objective-Cardiology


Exam


Last Set of Vital Signs





Vital Signs








 11/14/19 11/15/19





 12:00 08:00


 


Temp  36.6


 


Pulse  101


 


Resp  20


 


B/P (MAP)  117/73 (88)


 


Pulse Ox  97


 


O2 Delivery  Room Air


 


O2 Flow Rate 2.00 





Capillary Refill : Less Than 3 Seconds


I&O











Intake and Output 


 


 11/15/19





 00:00


 


Intake Total 5190 ml


 


Balance 5190 ml


 


 


 


Intake Oral 2590 ml


 


IV Total 2600 ml


 


# Voids 6


 


# Bowel Movements 1








General:  Alert, Oriented X3, Cooperative, No Acute Distress


HEENT:  Atraumatic, PERRLA


Neck:  Supple, No JVD


Lungs:  Clear to Auscultation, Normal Air Movement


Heart:  Regular Rate, Normal S1, Normal S2, No Murmurs


Abdomen:  Normal Bowel Sounds, Soft


Extremities:  No Clubbing


Skin:  No Rashes, Other (hemosiderin staining of both legs to knees, mild 

erythema surrounding scabbed lesion on left anterior shin)


Neuro:  Normal Speech


Psych/Mental Status:  Mental Status NL





Results


Lab


Laboratory Tests


11/15/19 05:20














A/P-Cardiology


Admission Diagnosis


Chest pain


Coronary artery disease


Hypertension


Hyperlipidemia





Assessment/Plan


Chest pain nonspecific etiology resembling angina, mild elevation in troponin, I

will proceed with a heart cath





Coronary artery disease, planning for a heart cath





Nonhealing leg ulcer, had a trauma in December, normal DIANNE, seen in , 

currently being treated and healing slowly





Hypertension, continue home medication monitor blood pressure next





Hyperlipidemia, monitor lipids





Diabetes mellitus, followed and managed by primary care physician next





Tobaccoism, educated on smoking cessation





Strong family history of atherosclerosis





History of methamphetamine use, educated on avoiding illicit drugs





Clinical Quality Measures


DVT/VTE Risk/Contraindication:


Risk Factor Score Per Nursin


RFS Level Per Nursing on Admit:  4+=Very High











KRYSTAL TAI MD              Nov 15, 2019 10:43


POS

## 2019-11-18 ENCOUNTER — HOSPITAL ENCOUNTER (EMERGENCY)
Dept: HOSPITAL 75 - ER | Age: 59
Discharge: HOME | End: 2019-11-18
Payer: COMMERCIAL

## 2019-11-18 VITALS — WEIGHT: 156.53 LBS | HEIGHT: 62.99 IN | BODY MASS INDEX: 27.73 KG/M2

## 2019-11-18 VITALS — SYSTOLIC BLOOD PRESSURE: 165 MMHG | DIASTOLIC BLOOD PRESSURE: 91 MMHG

## 2019-11-18 DIAGNOSIS — E87.70: Primary | ICD-10-CM

## 2019-11-18 DIAGNOSIS — Z88.5: ICD-10-CM

## 2019-11-18 DIAGNOSIS — I10: ICD-10-CM

## 2019-11-18 DIAGNOSIS — F17.210: ICD-10-CM

## 2019-11-18 DIAGNOSIS — E11.40: ICD-10-CM

## 2019-11-18 DIAGNOSIS — B19.20: ICD-10-CM

## 2019-11-18 DIAGNOSIS — Z98.51: ICD-10-CM

## 2019-11-18 DIAGNOSIS — Z87.442: ICD-10-CM

## 2019-11-18 DIAGNOSIS — M06.9: ICD-10-CM

## 2019-11-18 DIAGNOSIS — Z82.49: ICD-10-CM

## 2019-11-18 DIAGNOSIS — F41.9: ICD-10-CM

## 2019-11-18 DIAGNOSIS — Z79.82: ICD-10-CM

## 2019-11-18 LAB
ALBUMIN SERPL-MCNC: 2.9 GM/DL (ref 3.2–4.5)
ALP SERPL-CCNC: 147 U/L (ref 40–136)
ALT SERPL-CCNC: 17 U/L (ref 0–55)
BASOPHILS # BLD AUTO: 0 10^3/UL (ref 0–0.1)
BASOPHILS NFR BLD AUTO: 0 % (ref 0–10)
BILIRUB SERPL-MCNC: 0.5 MG/DL (ref 0.1–1)
BUN/CREAT SERPL: 20
BURR CELLS BLD QL SMEAR: SLIGHT
CALCIUM SERPL-MCNC: 8.1 MG/DL (ref 8.5–10.1)
CHLORIDE SERPL-SCNC: 108 MMOL/L (ref 98–107)
CO2 SERPL-SCNC: 16 MMOL/L (ref 21–32)
CREAT SERPL-MCNC: 1.69 MG/DL (ref 0.6–1.3)
DACRYOCYTES BLD QL SMEAR: SLIGHT
EOSINOPHIL # BLD AUTO: 0 10^3/UL (ref 0–0.3)
EOSINOPHIL NFR BLD AUTO: 0 % (ref 0–10)
ERYTHROCYTE [DISTWIDTH] IN BLOOD BY AUTOMATED COUNT: 14.6 % (ref 10–14.5)
GFR SERPLBLD BASED ON 1.73 SQ M-ARVRAT: 31 ML/MIN
GLUCOSE SERPL-MCNC: 221 MG/DL (ref 70–105)
HCT VFR BLD CALC: 27 % (ref 35–52)
HGB BLD-MCNC: 8.9 G/DL (ref 11.5–16)
LYMPHOCYTES # BLD AUTO: 0.2 X 10^3 (ref 1–4)
LYMPHOCYTES NFR BLD AUTO: 3 % (ref 12–44)
MANUAL DIFFERENTIAL PERFORMED BLD QL: YES
MCH RBC QN AUTO: 28 PG (ref 25–34)
MCHC RBC AUTO-ENTMCNC: 33 G/DL (ref 32–36)
MCV RBC AUTO: 84 FL (ref 80–99)
MONOCYTES # BLD AUTO: 0.2 X 10^3 (ref 0–1)
MONOCYTES NFR BLD AUTO: 4 % (ref 0–12)
MONOCYTES NFR BLD: 3 %
NEUTROPHILS # BLD AUTO: 5.9 X 10^3 (ref 1.8–7.8)
NEUTROPHILS NFR BLD AUTO: 93 % (ref 42–75)
NEUTS BAND NFR BLD MANUAL: 93 %
OVALOCYTES BLD QL SMEAR: SLIGHT
PLATELET # BLD: 87 10^3/UL (ref 130–400)
PMV BLD AUTO: 10.4 FL (ref 7.4–10.4)
POTASSIUM SERPL-SCNC: 3.8 MMOL/L (ref 3.6–5)
PROT SERPL-MCNC: 5.8 GM/DL (ref 6.4–8.2)
SODIUM SERPL-SCNC: 134 MMOL/L (ref 135–145)
VARIANT LYMPHS NFR BLD MANUAL: 4 %
WBC # BLD AUTO: 6.3 10^3/UL (ref 4.3–11)

## 2019-11-18 PROCEDURE — 71046 X-RAY EXAM CHEST 2 VIEWS: CPT

## 2019-11-18 PROCEDURE — 83880 ASSAY OF NATRIURETIC PEPTIDE: CPT

## 2019-11-18 PROCEDURE — 36415 COLL VENOUS BLD VENIPUNCTURE: CPT

## 2019-11-18 PROCEDURE — 85007 BL SMEAR W/DIFF WBC COUNT: CPT

## 2019-11-18 PROCEDURE — 86141 C-REACTIVE PROTEIN HS: CPT

## 2019-11-18 PROCEDURE — 80053 COMPREHEN METABOLIC PANEL: CPT

## 2019-11-18 PROCEDURE — 85027 COMPLETE CBC AUTOMATED: CPT

## 2019-11-18 NOTE — ED RESPIRATORY
General


Chief Complaint:  General Problems/Pain


Stated Complaint:  BLOATED


Nursing Triage Note:  


PT AMB TO [facility] WITH COMPLAINT OF BLOATING AND SOA. PT STATES SHE WAS ADMITTED 


AND DISCHARGED ON  WITH CELLUITIS. STATES SHE FEELS SHE IS FULL OF FLUIDS


Source:  patient


Exam Limitations:  no limitations





History of Present Illness


Date Seen by Provider:  2019


Time Seen by Provider:  13:16


Initial Comments


The patient presents to ER by private conveyance from the primary care clinic at

[facility] sent by Dr. Schuler. She was having some shortness of breath 

ever since her stay in the hospital and she was in from Tuesday to Friday of 

last week or cellulitis. She is followed by Dr. Naegele for chronic nonhealing 

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 She has a history of diabetes not on insulin as 

well as hepatitis C. She was sent over from the clinic because they said she was

tachypneic and tachycardic and would need IV Lasix but they did not think she 

would meet admission criteria. Patient says she's not having any pain nausea 

fever chills or cough just some shortness of breath and about 8 pounds weight 

gain since her admission. She has no history of coronary disease and had a heart

catheter last week by Dr. Jordan and was told it was normal.





Allergies and Home Medications


Allergies


Coded Allergies:  


     hydrocodone (Verified  Allergy, Unknown, 1/10/19)





Home Medications


Aspirin 81 Mg Tablet.dr, 81 MG PO DAILY


   Prescribed by: CLAUDIA SCHUMACHER on 11/15/19 0832


Celecoxib 200 Mg Capsule, 200 MG PO DAILY, (Reported)


Clindamycin HCl 150 Mg Capsule, 300 MG PO Q6HR


   Prescribed by: CLAUDIA SCHUMACHER on 11/15/19 0832


Folic Acid 1 Mg Tablet, 1 MG PO DAILY, (Reported)


   LAST FILLED #30 19 


Gabapentin 100 Mg Capsule, 100 MG PO BID, (Reported)


Hydrochlorothiazide 25 Mg Tablet, 25 MG PO DAILY, (Reported)


   LAST FILLED #30 19 


Losartan Potassium 50 Mg Tablet, 50 MG PO DAILY, (Reported)


Methotrexate Sodium 2.5 Mg Tablet, 10 TAB PO Sa, (Reported)


   LAST FILLED #16 19 TAKES 4 (2.5MG) TABLETS 


Omega 3 Polyunsat Fatty Acids 1,000 Mg Cap, 1,000 MG PO DAILY, (Reported)


Potassium Chloride 10 Meq Tab.er.prt, 10 MEQ PO DAILY, (Reported)


Silver 480 Ml Gel.er.ml., 0 OZ TP BID


   Prescribed by: CLAUDIA SCHUMACHER on 11/15/19 0832





Patient Home Medication List


Home Medication List Reviewed:  Yes





Review of Systems


Review of Systems


Constitutional:  No chills, No fever; malaise


EENTM:  No ear discharge


Respiratory:  No cough; short of breath


Cardiovascular:  No chest pain; edema; No Hx of Intervention, No palpitations, 

No vascular heart diseas


Gastrointestinal:  No abdominal pain, No constipation, No diarrhea


Genitourinary:  No discharge, No dysuria





All Other Systems Reviewed


Negative Unless Noted:  Yes





Past Medical-Social-Family Hx


Patient Social History


Alcohol Use:  Denies Use


Recreational Drug Use:  No


Drug of Choice:  + IV METH USE


Smoking Status:  Current Everyday Smoker


Type Used:  Cigarettes


2nd Hand Smoke Exposure:  Yes


Recent Foreign Travel:  No


Contact w/Someone Who Travel:  No


Recent Infectious Disease Expo:  No


Recent Hopitalizations:  No


Physical Abuse:  No


Sexual Abuse:  No


Mistreated:  No


Fear:  No





Immunizations Up To Date


Tetanus Booster (TDap):  More than 5yrs


Date of Influenza Vaccine:  Oct 1, 2019





Seasonal Allergies


Seasonal Allergies:  No





Past Medical History


Surgeries:  Yes (LITHOTRIPSY; FACIAL RECONSTRUCTION;  X 1-TWINS)


 Section, Renal, Tubal Ligation


Respiratory:  No


Cardiac:  Yes (VASCULITIS OF LEGS)


Chronic Edema/Swelling, Hypertension, Peripheral Vascular


Neurological:  Yes


Neuropathy


GYN History:  Menopausal


Genitourinary:  Yes


Kidney Stones


Gastrointestinal:  Yes (HEPATITIS C--NO TREATMENT)


Hepatitis


Musculoskeletal:  Yes


Arthritis, Rheumatoid Arthritis


Endocrine:  Yes


Diabetes, Non-Insulin dep


HEENT:  No


Cancer:  No


Psychosocial:  Yes


Anxiety


Integumentary:  Yes (CHRONIC LEG EDEMA AND CELLULITIS ; VASCULITIS)


Blood Disorders:  No





Family Medical History


Heart Disease, Cancer, Diabetes





Physical Exam





Vital Signs - First Documented








 19





 12:54


 


Temp 37.1


 


Pulse 95


 


Resp 20


 


B/P (MAP) 181/101 (127)


 


Pulse Ox 94


 


O2 Delivery Room Air





Capillary Refill : Less Than 3 Seconds


Height: 5'3.00"


Weight: 159lbs. 0oz. 72.198005iy; 27.00 BMI


Method:Stated


General Appearance:  WD/WN, no apparent distress


Eyes:  Bilateral Eye Normal Inspection, Bilateral Eye PERRL, Bilateral Eye EOMI


HEENT:  PERRL/EOMI, normal ENT inspection, pharynx normal


Neck:  full range of motion, other (1 cm of mild JVD)


Respiratory:  lungs clear, normal breath sounds, no respiratory distress, no 

accessory muscle use


Cardiovascular:  normal peripheral pulses, regular rate, rhythm


Gastrointestinal:  non tender, soft


Neurologic/Psychiatric:  alert, normal mood/affect, oriented x 3


Skin:  normal color, warm/dry





Progress/Results/Core Measures


Suspected Sepsis


Recent Fever Within 48 Hours:  No


Infection Criteria Present:  None


New/Unexplained  Altered Menta:  No


Sepsis Screen:  No Definite Risk


SIRS


Temperature: 


Pulse: 95 


Respiratory Rate: 20


 


Laboratory Tests


19 15:07: White Blood Count 6.3


Blood Pressure 181 /101 


Mean: 127


 











Laboratory Tests


19 15:07: 


Creatinine 1.69H, Platelet Count 87L, Total Bilirubin 0.5





Results/Orders


Lab Results





Laboratory Tests








Test


 19


15:07 Range/Units


 


 


White Blood Count


 6.3 


 4.3-11.0


10^3/uL


 


Red Blood Count


 3.21 L


 4.35-5.85


10^6/uL


 


Hemoglobin 8.9 L 11.5-16.0  G/DL


 


Hematocrit 27 L 35-52  %


 


Mean Corpuscular Volume 84  80-99  FL


 


Mean Corpuscular Hemoglobin 28  25-34  PG


 


Mean Corpuscular Hemoglobin


Concent 33 


 32-36  G/DL





 


Red Cell Distribution Width 14.6 H 10.0-14.5  %


 


Platelet Count


 87 L


 130-400


10^3/uL


 


Mean Platelet Volume 10.4  7.4-10.4  FL


 


Neutrophils (%) (Auto) 93 H 42-75  %


 


Lymphocytes (%) (Auto) 3 L 12-44  %


 


Monocytes (%) (Auto) 4  0-12  %


 


Eosinophils (%) (Auto) 0  0-10  %


 


Basophils (%) (Auto) 0  0-10  %


 


Neutrophils # (Auto) 5.9  1.8-7.8  X 10^3


 


Lymphocytes # (Auto) 0.2 L 1.0-4.0  X 10^3


 


Monocytes # (Auto) 0.2  0.0-1.0  X 10^3


 


Eosinophils # (Auto)


 0.0 


 0.0-0.3


10^3/uL


 


Basophils # (Auto)


 0.0 


 0.0-0.1


10^3/uL


 


Neutrophils % (Manual) 93   %


 


Lymphocytes % (Manual) 4   %


 


Monocytes % (Manual) 3   %


 


Tear Drop Cells SLIGHT   


 


Noemy Cells SLIGHT   


 


Elliptocytes SLIGHT   


 


Sodium Level 134 L 135-145  MMOL/L


 


Potassium Level 3.8  3.6-5.0  MMOL/L


 


Chloride Level 108 H   MMOL/L


 


Carbon Dioxide Level 16 L 21-32  MMOL/L


 


Anion Gap 10  5-14  MMOL/L


 


Blood Urea Nitrogen 33 H 7-18  MG/DL


 


Creatinine


 1.69 H


 0.60-1.30


MG/DL


 


Estimat Glomerular Filtration


Rate 31 


  





 


BUN/Creatinine Ratio 20   


 


Glucose Level 221 H   MG/DL


 


Calcium Level 8.1 L 8.5-10.1  MG/DL


 


Corrected Calcium 9.0  8.5-10.1  MG/DL


 


Total Bilirubin 0.5  0.1-1.0  MG/DL


 


Aspartate Amino Transf


(AST/SGOT) 18 


 5-34  U/L





 


Alanine Aminotransferase


(ALT/SGPT) 17 


 0-55  U/L





 


Alkaline Phosphatase 147 H   U/L


 


C-Reactive Protein High


Sensitivity 8.55 H


 0.00-0.50


MG/DL


 


B-Type Natriuretic Peptide 534.9 H <100.0  PG/ML


 


Total Protein 5.8 L 6.4-8.2  GM/DL


 


Albumin 2.9 L 3.2-4.5  GM/DL








My Orders





Orders - ARIANNE WEBBER


Cbc With Automated Diff (19 13:25)


Comprehensive Metabolic Panel (19 13:25)


Hs C Reactive Protein (19 13:25)


BNP (19 13:25)


Chest Pa/Lat (2 View) (19 13:25)


Manual Differential (19 15:07)





Vital Signs/I&O











 19





 12:54


 


Temp 37.1


 


Pulse 95


 


Resp 20


 


B/P (MAP) 181/101 (127)


 


Pulse Ox 94


 


O2 Delivery Room Air





Capillary Refill : Less Than 3 Seconds








Blood Pressure Mean:                    127


POS





Progress Note :  


   Time:  16:11


Progress Note


Her BNP is 500 and her normal being double digits. She has some JVD distention 

and does appear to be mildly fluid overload so we'll give her a single dose of 

IV Lasix. Her creatinine is similar to her discharge creatinine a few days ago 

1.57. We'll have her follow-up with primary care for reevaluation later in the 

week. 


Patient was a difficult IV stick so we attempted by several different providers 

to obtain blood which did cause a modicum of delayed.





Departure


Impression





   Primary Impression:  


   Fluid overload, unspecified


   Qualified Codes:  E87.79 - Other fluid overload


Disposition:  01 HOME, SELF-CARE


Condition:  Stable





Departure-Patient Inst.


Decision time for Depature:  16:13


Referrals:  


[facility] [place] ([place])


Primary Care Physician








JENARO SCHULER MD (Family)


Primary Care Physician


Patient Instructions:  Dependent Edema (DC)





Add. Discharge Instructions:  


Take your feet up above the level of your heart to help with swelling.


Tomorrow take one tablet of Lasix daily for the next 2 days in the morning.


Take the potassium 20 mEq twice a day for the next 3 days.


Tomorrow call for a follow-up appointment with your doctor later this week for 

reevaluation.


If you cannot catch your breath or you start to have chest pain then please 

return to the nearest ER for further evaluation.





All discharge instructions reviewed with patient and/or family. Voiced 

understanding.


Scripts


Furosemide (Furosemide) 20 Mg Tablet


20 MG PO DAILY for 2 Days, #2 TAB 0 Refills


   Prov: ARIANNE WEBBER         19 


Potassium Chloride (Potassium Chloride) 20 Meq Tablet.er


20 MEQ PO BID for 3 Days, #6 TAB 0 Refills


   Prov: ARIANNE WEBBER         19





Copy


Copies To 1:   GALEN THOMPSON DO











ARIANNE WEBBER                 2019 13:28


POS

## 2019-11-18 NOTE — DIAGNOSTIC IMAGING REPORT
INDICATION: Bloating and shortness of air.



No priors. 



FINDINGS:

Poor inspiratory volume. Crowding of the lung markings. There is

bibasilar zones of partial atelectasis as well as the suggestion

of some air bronchograms. Pneumonia superimposed upon atelectasis

could not be excluded. No definite pleural fluid. No

pneumothorax. 



IMPRESSION:

Very poor inspiratory volume with basilar zones of partial

atelectasis, superimposition of pneumonia could not be excluded.



Dictated by: 



  Dictated on workstation # [id number]

## 2019-12-17 NOTE — ED LOWER EXTREMITY
General


Chief Complaint:  Lower Extremity


Stated Complaint:  RED, PAINFUL, SWOLLEN LEGS


Nursing Triage Note:  


Pt has had bilateral leg swelling for the past two days. Both lower legs are 


bright red and swollen and fingers are swollen. Friend that drove pt stated 

that 


they were worried about congestive heart failure, because that happened to her 


mom.


Nursing Sepsis Screen:  No Definite Risk





Allergies and Home Medications


Allergies


Coded Allergies:  


     hydrocodone (Verified  Allergy, Unknown, 1/10/19)





Past Medical-Social-Family Hx


Patient Social History


Alcohol Use:  Denies Use


Recreational Drug Use:  Yes (last used 1/10/2019)


Drug of Choice:  Meth


Smoking Status:  Current Everyday Smoker


Type Used:  Cigarettes


2nd Hand Smoke Exposure:  Yes


Recent Foreign Travel:  No


Contact w/Someone Who Travel:  No


Recent Infectious Disease Expo:  No


Recent Hopitalizations:  No


Physical Abuse:  No


Sexual Abuse:  No


Mistreated:  No


Fear:  No





Seasonal Allergies


Seasonal Allergies:  No





Past Medical History


Surgeries:  Yes


 Section


Cardiac:  Yes


Hypertension, Peripheral Vascular


Neuropathy


GYN History:  Menopausal


Genitourinary:  No


Gastrointestinal:  No


Musculoskeletal:  Yes


Rheumatoid Arthritis


Endocrine:  No


HEENT:  No


Cancer:  No


Psychosocial:  Yes


Anxiety


Integumentary:  Yes (cellulitis)


Blood Disorders:  No





Physical Exam


Vital Signs





Vital Signs - First Documented








 1/10/19





 19:34


 


Temp 97.7


 


Pulse 105


 


Resp 20


 


B/P (MAP) 163/96 (118)


 


Pulse Ox 100


 


O2 Delivery Room Air





Capillary Refill : Less Than 3 Seconds


Height, Weight, BMI


Height: 5'3.00"


Weight: 159lbs. 0oz. 72.920689bf;  BMI


Method:Stated





Progress/Results/Core Measures


Results/Orders


Lab Results





Laboratory Tests








Test


 1/10/19


21:03 1/10/19


22:12 Range/Units


 


 


White Blood Count


 3.3 L


 


 4.3-11.0


10^3/uL


 


Red Blood Count


 4.23 L


 


 4.35-5.85


10^6/uL


 


Hemoglobin 13.1   11.5-16.0  G/DL


 


Hematocrit 38   35-52  %


 


Mean Corpuscular Volume 89   80-99  FL


 


Mean Corpuscular Hemoglobin 31   25-34  PG


 


Mean Corpuscular Hemoglobin


Concent 35 


 


 32-36  G/DL





 


Red Cell Distribution Width 13.3   10.0-14.5  %


 


Platelet Count


 144 


 


 130-400


10^3/uL


 


Mean Platelet Volume 9.8   7.4-10.4  FL


 


Neutrophils (%) (Auto) 70   42-75  %


 


Lymphocytes (%) (Auto) 23   12-44  %


 


Monocytes (%) (Auto) 6   0-12  %


 


Eosinophils (%) (Auto) 2   0-10  %


 


Basophils (%) (Auto) 0   0-10  %


 


Neutrophils # (Auto) 2.3   1.8-7.8  X 10^3


 


Lymphocytes # (Auto) 0.8 L  1.0-4.0  X 10^3


 


Monocytes # (Auto) 0.2   0.0-1.0  X 10^3


 


Eosinophils # (Auto)


 0.1 


 


 0.0-0.3


10^3/uL


 


Basophils # (Auto)


 0.0 


 


 0.0-0.1


10^3/uL


 


Erythrocyte Sedimentation Rate 13   0-30  MM/HR


 


Prothrombin Time 13.5   12.2-14.7  SEC


 


INR Comment 1.0   0.8-1.4  


 


Activated Partial


Thromboplast Time 24 


 


 24-35  SEC





 


Sodium Level 140   135-145  MMOL/L


 


Potassium Level 3.8   3.6-5.0  MMOL/L


 


Chloride Level 107     MMOL/L


 


Carbon Dioxide Level 21   21-32  MMOL/L


 


Anion Gap 12   5-14  MMOL/L


 


Blood Urea Nitrogen 10   7-18  MG/DL


 


Creatinine


 0.73 


 


 0.60-1.30


MG/DL


 


Estimat Glomerular Filtration


Rate > 60 


 


  





 


BUN/Creatinine Ratio 14    


 


Glucose Level 139 H    MG/DL


 


Calcium Level 9.2   8.5-10.1  MG/DL


 


Corrected Calcium 9.4   8.5-10.1  MG/DL


 


Magnesium Level 1.7 L  1.8-2.4  MG/DL


 


Total Bilirubin 0.7   0.1-1.0  MG/DL


 


Aspartate Amino Transf


(AST/SGOT) 36 H


 


 5-34  U/L





 


Alanine Aminotransferase


(ALT/SGPT) 43 


 


 0-55  U/L





 


Alkaline Phosphatase 151 H    U/L


 


Troponin I < 0.028   <0.028  NG/ML


 


C-Reactive Protein High


Sensitivity 2.55 H


 


 0.00-0.50


MG/DL


 


B-Type Natriuretic Peptide 36.0   <100.0  PG/ML


 


Total Protein 6.7   6.4-8.2  GM/DL


 


Albumin 3.7   3.2-4.5  GM/DL


 


TSH Cascade Testing


 1.29 


 


 0.35-4.94


UIU/ML


 


Serum Alcohol < 10   <10  MG/DL


 


Urine Color  COLLETTE H  


 


Urine Clarity


 


 SLIGHTLY


CLOUDY  





 


Urine pH  6  5-9  


 


Urine Specific Gravity  1.025 H 1.016-1.022  


 


Urine Protein  1+ H NEGATIVE  


 


Urine Glucose (UA)  NEGATIVE  NEGATIVE  


 


Urine Ketones  NEGATIVE  NEGATIVE  


 


Urine Nitrite  NEGATIVE  NEGATIVE  


 


Urine Bilirubin  NEGATIVE  NEGATIVE  


 


Urine Urobilinogen  4 H NORMAL  MG/DL


 


Urine Leukocyte Esterase  2+ H NEGATIVE  


 


Urine RBC (Auto)  2+ H NEGATIVE  


 


Urine RBC  5-10 H  /HPF


 


Urine WBC  25-50 H  /HPF


 


Urine Squamous Epithelial


Cells 


 2-5 


  /HPF





 


Urine Crystals  PRESENT H  /LPF


 


Urine Calcium Oxalate Crystals  FEW H  /LPF


 


Urine Bacteria  FEW H  /HPF


 


Urine Casts  NONE   /LPF


 


Urine Mucus  SMALL H  /LPF


 


Urine Culture Indicated  YES   


 


Urine Opiates Screen  NEGATIVE  NEGATIVE  


 


Urine Oxycodone Screen  NEGATIVE  NEGATIVE  


 


Urine Methadone Screen  NEGATIVE  NEGATIVE  


 


Urine Propoxyphene Screen  NEGATIVE  NEGATIVE  


 


Urine Barbiturates Screen  NEGATIVE  NEGATIVE  


 


Ur Tricyclic Antidepressants


Screen 


 NEGATIVE 


 NEGATIVE  





 


Urine Phencyclidine Screen  NEGATIVE  NEGATIVE  


 


Urine Amphetamines Screen  POSITIVE H NEGATIVE  


 


Urine Methamphetamines Screen  NEGATIVE  NEGATIVE  


 


Urine Benzodiazepines Screen  NEGATIVE  NEGATIVE  


 


Urine Cocaine Screen  NEGATIVE  NEGATIVE  


 


Urine Cannabinoids Screen  NEGATIVE  NEGATIVE  








My Orders





Orders - MATTY THOMASA K DO


Saline Lock/Iv-Start (1/10/19 19:38)


Ekg Tracing (1/10/19 19:38)


Monitor-Rhythm Ecg Trace Only (1/10/19 19:38)


Alcohol (1/10/19 19:38)


BNP (1/10/19 19:38)


Cbc With Automated Diff (1/10/19 19:38)


Comprehensive Metabolic Panel (1/10/19 19:38)


Hs C Reactive Protein (1/10/19 19:38)


Erythrocyte Sedimentation Rate (1/10/19 19:38)


Drug Screen Stat (Urine) (1/10/19 19:38)


Magnesium (1/10/19 19:38)


Protime With Inr (1/10/19 19:38)


Partial Thromboplastin Time (1/10/19 19:38)


Thyroid Analyzer (1/10/19 19:38)


Troponin I (1/10/19 19:38)


Ua Culture If Indicated (1/10/19 19:38)


Chest Pa/Lat (2 View) (1/10/19 19:38)


Clindamycin Injection (Cleocin Injection (1/10/19 22:15)


Ketorolac Injection (Toradol Injection) (1/10/19 22:15)


Sulfamethoxazole/Trimet Ds Tab (Bactrim (1/10/19 22:15)


Clindamycin Injection (Cleocin Injection (1/10/19 22:15)


Urine Culture (1/10/19 22:12)





Medications Given in ED





Current Medications








 Medications  Dose


 Ordered  Sig/Irving


 Route  Start Time


 Stop Time Status Last Admin


Dose Admin


 


 Clindamycin


 Phosphate  300 mg  STK-MED ONCE


 .ROUTE  1/10/19 22:15


 1/10/19 22:19 DC 1/10/19 22:31


600 MG


 


 Ketorolac


 Tromethamine  60 mg  ONCE  ONCE


 IM  1/10/19 22:15


 1/10/19 22:16 UNV 1/10/19 22:26


60 MG


 


 Trimethoprim/


 Sulfamethoxazole  2 ea  ONCE  ONCE


 PO  1/10/19 22:15


 1/10/19 22:16 UNV 1/10/19 22:27


2 EA








Vital Signs/I&O











 1/10/19





 19:34


 


Temp 97.7


 


Pulse 105


 


Resp 20


 


B/P (MAP) 163/96 (118)


 


Pulse Ox 100


 


O2 Delivery Room Air














Blood Pressure Mean:  118











Diagnostic Imaging





Comments


CXR--NO ACUTE PROCESS, PER RADIOLOGIST REPORT @ 2202





Departure


Impression





 Primary Impression:  


 EXACERBATION OF CHRONIC BILATERAL LOWER LEG CELLULITIS


 Additional Impressions:  


 UTI (urinary tract infection)


 Illicit drug use


Disposition:  01 HOME, SELF-CARE


Condition:  Stable





Departure-Patient Inst.


Referrals:  


[facility] [place] ([place])


Primary Care Physician








JENARO SCHULER MD (Family)


Primary Care Physician


Patient Instructions:  Cellulitis (Skin Infection), Adult (DC), Drug Abuse 

Treatment, Drug Abuse and Drug Addiction (DC), Urinary Tract Infection, Adult (

DC)





Add. Discharge Instructions:  


FOLLOW UP WITH [facility] [place] TOMORROW-CALL IN AM FOR APPOINTMENT 





TYLENOL AND MOTRIN AS NEEDED FOR PAIN 





All discharge instructions reviewed with patient and/or family. Voiced 

understanding.


Scripts


Sulfamethoxazole/Trimethoprim (Bactrim Ds Tablet) 1 Each Tablet


2 EACH PO BID, #40 TAB


   Prov: YADIRA THOMAS DO         1/10/19











YADIRA THOMAS DO Sammy 10, 2019 22:07 voice mail left with Ms. Delaney Browne [phone]/yes

## 2019-12-31 ENCOUNTER — HOSPITAL ENCOUNTER (INPATIENT)
Dept: HOSPITAL 75 - ER | Age: 59
LOS: 3 days | Discharge: HOME | DRG: 871 | End: 2020-01-03
Attending: INTERNAL MEDICINE | Admitting: INTERNAL MEDICINE
Payer: COMMERCIAL

## 2019-12-31 VITALS — SYSTOLIC BLOOD PRESSURE: 110 MMHG | DIASTOLIC BLOOD PRESSURE: 70 MMHG

## 2019-12-31 VITALS — WEIGHT: 148.37 LBS | HEIGHT: 61.97 IN | BODY MASS INDEX: 27.3 KG/M2

## 2019-12-31 DIAGNOSIS — D64.9: ICD-10-CM

## 2019-12-31 DIAGNOSIS — E11.51: ICD-10-CM

## 2019-12-31 DIAGNOSIS — M19.90: ICD-10-CM

## 2019-12-31 DIAGNOSIS — E11.40: ICD-10-CM

## 2019-12-31 DIAGNOSIS — D61.818: ICD-10-CM

## 2019-12-31 DIAGNOSIS — F41.9: ICD-10-CM

## 2019-12-31 DIAGNOSIS — F10.10: ICD-10-CM

## 2019-12-31 DIAGNOSIS — F17.210: ICD-10-CM

## 2019-12-31 DIAGNOSIS — A41.9: Primary | ICD-10-CM

## 2019-12-31 DIAGNOSIS — K74.60: ICD-10-CM

## 2019-12-31 DIAGNOSIS — Z56.0: ICD-10-CM

## 2019-12-31 DIAGNOSIS — I10: ICD-10-CM

## 2019-12-31 DIAGNOSIS — M06.9: ICD-10-CM

## 2019-12-31 DIAGNOSIS — F15.90: ICD-10-CM

## 2019-12-31 DIAGNOSIS — J18.9: ICD-10-CM

## 2019-12-31 DIAGNOSIS — B18.2: ICD-10-CM

## 2019-12-31 DIAGNOSIS — F32.9: ICD-10-CM

## 2019-12-31 LAB
%HYPO/RBC NFR BLD AUTO: SLIGHT %
ALBUMIN SERPL-MCNC: 3.4 GM/DL (ref 3.2–4.5)
ALP SERPL-CCNC: 97 U/L (ref 40–136)
ALT SERPL-CCNC: 20 U/L (ref 0–55)
ANISOCYTOSIS BLD QL SMEAR: SLIGHT
APTT BLD: 31 SEC (ref 24–35)
BASOPHILS # BLD AUTO: 0 10^3/UL (ref 0–0.1)
BASOPHILS NFR BLD AUTO: 0 % (ref 0–10)
BASOPHILS NFR BLD MANUAL: 0 %
BILIRUB SERPL-MCNC: 1.1 MG/DL (ref 0.1–1)
BUN/CREAT SERPL: 16
CALCIUM SERPL-MCNC: 8.9 MG/DL (ref 8.5–10.1)
CHLORIDE SERPL-SCNC: 102 MMOL/L (ref 98–107)
CO2 SERPL-SCNC: 20 MMOL/L (ref 21–32)
CREAT SERPL-MCNC: 1 MG/DL (ref 0.6–1.3)
DACRYOCYTES BLD QL SMEAR: SLIGHT
EOSINOPHIL # BLD AUTO: 0 10^3/UL (ref 0–0.3)
EOSINOPHIL NFR BLD AUTO: 0 % (ref 0–10)
EOSINOPHIL NFR BLD MANUAL: 0 %
ERYTHROCYTE [DISTWIDTH] IN BLOOD BY AUTOMATED COUNT: 16 % (ref 10–14.5)
GFR SERPLBLD BASED ON 1.73 SQ M-ARVRAT: 57 ML/MIN
GLUCOSE SERPL-MCNC: 205 MG/DL (ref 70–105)
HCT VFR BLD CALC: 26 % (ref 35–52)
HGB BLD-MCNC: 8.2 G/DL (ref 11.5–16)
INR PPP: 1.3 (ref 0.8–1.4)
LYMPHOCYTES # BLD AUTO: 0.3 X 10^3 (ref 1–4)
LYMPHOCYTES NFR BLD AUTO: 4 % (ref 12–44)
MANUAL DIFFERENTIAL PERFORMED BLD QL: YES
MCH RBC QN AUTO: 27 PG (ref 25–34)
MCHC RBC AUTO-ENTMCNC: 31 G/DL (ref 32–36)
MCV RBC AUTO: 86 FL (ref 80–99)
MICROCYTES BLD QL SMEAR: SLIGHT
MONOCYTES # BLD AUTO: 0.4 X 10^3 (ref 0–1)
MONOCYTES NFR BLD AUTO: 5 % (ref 0–12)
MONOCYTES NFR BLD: 7 %
NEUTROPHILS # BLD AUTO: 7 X 10^3 (ref 1.8–7.8)
NEUTROPHILS NFR BLD AUTO: 91 % (ref 42–75)
NEUTS BAND NFR BLD MANUAL: 85 %
NEUTS BAND NFR BLD: 4 %
OVALOCYTES BLD QL SMEAR: SLIGHT
PLATELET # BLD: 172 10^3/UL (ref 130–400)
PMV BLD AUTO: 10.6 FL (ref 7.4–10.4)
POLYCHROMASIA BLD QL SMEAR: SLIGHT
POTASSIUM SERPL-SCNC: 3.7 MMOL/L (ref 3.6–5)
PROT SERPL-MCNC: 6.6 GM/DL (ref 6.4–8.2)
PROTHROMBIN TIME: 16.3 SEC (ref 12.2–14.7)
SODIUM SERPL-SCNC: 133 MMOL/L (ref 135–145)
VARIANT LYMPHS NFR BLD MANUAL: 1 %
VARIANT LYMPHS NFR BLD MANUAL: 3 %
WBC # BLD AUTO: 7.7 10^3/UL (ref 4.3–11)

## 2019-12-31 PROCEDURE — 87040 BLOOD CULTURE FOR BACTERIA: CPT

## 2019-12-31 PROCEDURE — 96361 HYDRATE IV INFUSION ADD-ON: CPT

## 2019-12-31 PROCEDURE — 82140 ASSAY OF AMMONIA: CPT

## 2019-12-31 PROCEDURE — 94640 AIRWAY INHALATION TREATMENT: CPT

## 2019-12-31 PROCEDURE — 86920 COMPATIBILITY TEST SPIN: CPT

## 2019-12-31 PROCEDURE — 71045 X-RAY EXAM CHEST 1 VIEW: CPT

## 2019-12-31 PROCEDURE — 85027 COMPLETE CBC AUTOMATED: CPT

## 2019-12-31 PROCEDURE — 85730 THROMBOPLASTIN TIME PARTIAL: CPT

## 2019-12-31 PROCEDURE — 86900 BLOOD TYPING SEROLOGIC ABO: CPT

## 2019-12-31 PROCEDURE — 85007 BL SMEAR W/DIFF WBC COUNT: CPT

## 2019-12-31 PROCEDURE — 86850 RBC ANTIBODY SCREEN: CPT

## 2019-12-31 PROCEDURE — 36415 COLL VENOUS BLD VENIPUNCTURE: CPT

## 2019-12-31 PROCEDURE — 85025 COMPLETE CBC W/AUTO DIFF WBC: CPT

## 2019-12-31 PROCEDURE — 80053 COMPREHEN METABOLIC PANEL: CPT

## 2019-12-31 PROCEDURE — 86901 BLOOD TYPING SEROLOGIC RH(D): CPT

## 2019-12-31 PROCEDURE — 85610 PROTHROMBIN TIME: CPT

## 2019-12-31 PROCEDURE — 94760 N-INVAS EAR/PLS OXIMETRY 1: CPT

## 2019-12-31 PROCEDURE — 83605 ASSAY OF LACTIC ACID: CPT

## 2019-12-31 PROCEDURE — 96374 THER/PROPH/DIAG INJ IV PUSH: CPT

## 2019-12-31 PROCEDURE — 87804 INFLUENZA ASSAY W/OPTIC: CPT

## 2019-12-31 RX ADMIN — IBUPROFEN PRN MG: 200 TABLET, FILM COATED ORAL at 23:02

## 2019-12-31 RX ADMIN — SODIUM CHLORIDE, SODIUM LACTATE, POTASSIUM CHLORIDE, AND CALCIUM CHLORIDE SCH MLS/HR: 600; 310; 30; 20 INJECTION, SOLUTION INTRAVENOUS at 22:13

## 2019-12-31 SDOH — ECONOMIC STABILITY - INCOME SECURITY: UNEMPLOYMENT, UNSPECIFIED: Z56.0

## 2019-12-31 NOTE — DIAGNOSTIC IMAGING REPORT
INDICATION: Fever, shortness of breath.



COMPARISON: 11/18/2019.



FINDINGS: Single view of the chest demonstrates cardiac

enlargement with central vascular congestion. There is no

infiltrate in the left hilum and base. There is no pneumothorax

or effusion. Osseous structures are normal.



IMPRESSION:

1. Cardiac enlargement with central vascular congestion.

2. New infiltrate left hilum and base. Follow-up recommended.



Dictated by: 



  Dictated on workstation # [id number]

## 2019-12-31 NOTE — NUR
pt temp 38.3, no improvement after Tylenol, , RR 28, B/P 108/70, complaining of 8/10 
pain. Dr avelar notified, new order received, see order history.

## 2019-12-31 NOTE — NUR
SOLO LOPEZ admitted to room 431-1, with an admitting diagnosis of pneumonia, on 
12/31/19 from [place] via cart, accompanied by staff.SOLO LOPEZ introduced to 
surroundings, call light, bed controls, phone, TV, temperature control, lights, meal times, 
smoking policy, visitor policy, side rail policy, bathrooms and showers.  Patient Rights 
given to patient in the handbook. SOLO LOPEZ verbalizes understanding that [facility] is not responsible for the loss or damage to any personal effects or valuables that 
are kept in the patients posession during their hospitalization.  Patient and/or family were 
informed about the Rapid Response Team and its purpose.

## 2019-12-31 NOTE — ED GENERAL
General


Chief Complaint:  Fever-Adult/Adol


Stated Complaint:  FEVER,COUGHING


Nursing Triage Note:  


FEVER STARTING LAST NIGHT. TOOK EITHER ALEVE OR IBUPROFEN AT 1730 FOR A FEVER OF




102


Nursing Sepsis Screen:  Possible Severe Sepsis Risk


Source of Information:  Patient, Family


Exam Limitations:  Other (clinical condition )


 (RICH EL,MED STUDENT)





History of Present Illness


Date Seen by Provider:  Dec 31, 2019


Time Seen by Provider:  19:01


Initial Comments


Pt brought in by family with complaints of fever, cough, malaise and change in 

behavior. Symptoms started early yesterday. Highest recorded temperature by 

family was 102.0. Pt endorses headache, fever, chills, body ache, cough, nausea 

and shortness of breath. She denies vomiting, diarrhea. Pt took ibuprofen today 

for fever but cannot relate time or amount of dose. 


Pt somewhat somnolent and falls asleep while answering questions. 


Past medical history is notable for cirrhosis, hep. c infection, alcohol abuse 

and methamphetamine use.


Timing/Duration:  12-24 Hours


Severity:  Moderate


Associated Systoms:  No Chest Pain; Cough, Fever/Chills, Headaches, Malaise; No 

Nausea/Vomiting, No Rash; Shortness of Air (RICH EL,MED STUDENT)


Initial Comments


Here with complaint of fever since yesterday, body aches, chills and cough. 

Symptoms worsening today and now she is becoming somewhat confused. She is 

answering questions but seems tired. No reported vomiting or diarrhea. Does have

history of hepatitis. Does have a remote history of drug abuse but has been 

clean for a few years now.


Timing/Duration:  1-2 Days


Severity:  Moderate


Associated Systoms:  Cough, Fever/Chills, Headaches, Malaise; No 

Nausea/Vomiting; Shortness of Air (CARLTON SCRUGGS MD)





Allergies and Home Medications


Allergies


Coded Allergies:  


     hydrocodone (Verified  Allergy, Unknown, 1/10/19)


     hydrochlorothiazide (Verified  Adverse Reaction, Unknown, DIARRHEA, 

19)





Home Medications


Albuterol Sulfate 2.5 Mg/3 Ml Vial.neb, 2.5 MG NEB Q4H PRN for SHORTNESS OF 

BREATH, (Reported)


Aspirin 81 Mg Tablet.dr, 81 MG PO DAILY, (Reported)


   LAST FILLED #30 11-15-19 


Celecoxib 200 Mg Capsule, 200 MG PO DAILY, (Reported)


   LAST FILLED #30 19 


Folic Acid 1 Mg Tablet, 1 MG PO DAILY, (Reported)


   LAST FILLED #30 19 


Gabapentin 100 Mg Capsule, 100 MG PO BID, (Reported)


   LAST FILLED #60 10-07-19 


Hydrochlorothiazide 25 Mg Tablet, 25 MG PO DAILY, (Reported)


   LAST FILLED #30 19 


Losartan Potassium 50 Mg Tablet, 50 MG PO DAILY, (Reported)


   LAST FILLED #30 19 


Methotrexate Sodium 2.5 Mg Tablet, 10 MG PO Sa, (Reported)


   LAST FILLED #16 TABLETS 19 TAKES 4 (2.5MG) TABLETS 


Potassium Chloride 10 Meq Tab.er.prt, 10 MEQ PO DAILY, (Reported)


   LAST FILLED #30 19 





Patient Home Medication List


Home Medication List Reviewed:  Yes


 (RICH EL MED STUDENT)


Home Medication List Reviewed:  Yes


 (CARLTON SCRUGGS MD)





Review of Systems


Review of Systems


Constitutional:  chills, fever, malaise, weakness


EENTM:  No hearing loss, No ear pain, No eye pain, No vision loss


Respiratory:  cough, short of breath


Gastrointestinal:  No abdominal pain, No constipation, No diarrhea; nausea; No 

vomiting


Skin:  No lesions, No lumps, No rash


Exam limited by clinical condition 


 (RICH LE MED STUDENT)


Constitutional:  see HPI


Respiratory:  see HPI


Cardiovascular:  no symptoms reported


Gastrointestinal:  no symptoms reported


Genitourinary:  no symptoms reported (CARLTON SCRUGGS MD)





All Other Systems Reviewed


Negative Unless Noted:  Yes


 (CARLTON SCRUGGS MD)





Past Medical-Social-Family Hx


Past Med/Social Hx:  Reviewed Nursing Past Med/Soc Hx


 (CARLTON SCRUGGS MD)


Patient Social History


Alcohol Use:  Denies Use


Recreational Drug Use:  Yes (PAST HX METH)


Drug of Choice:  + IV METH USE


Smoking Status:  Current Everyday Smoker


Type Used:  Cigarettes


2nd Hand Smoke Exposure:  Yes


Recent Foreign Travel:  No


Contact w/Someone Who Travel:  No


Recent Infectious Disease Expo:  No


Recent Hopitalizations:  No


Physical Abuse:  No


Sexual Abuse:  No


Mistreated:  No


Fear:  No


 (RICH EL MED STUDENT)





Immunizations Up To Date


Tetanus Booster (TDap):  More than 5yrs


Date of Influenza Vaccine:  Oct 1, 2019


 (RICH EL MED STUDENT)





Seasonal Allergies


Seasonal Allergies:  No


 (RICH EL MED STUDENT)





Past Medical History


Surgeries:  Yes (LITHOTRIPSY; FACIAL RECONSTRUCTION;  X 1-TWINS)


 Section, Renal, Tubal Ligation


Respiratory:  No


Cardiac:  Yes (VASCULITIS OF LEGS)


Chronic Edema/Swelling, Hypertension, Peripheral Vascular


Neurological:  Yes


Neuropathy


GYN History:  Menopausal


Genitourinary:  Yes


Kidney Stones


Gastrointestinal:  Yes (HEPATITIS C--NO TREATMENT)


Hepatitis, Cirrhosis


Musculoskeletal:  Yes


Arthritis, Rheumatoid Arthritis


Endocrine:  Yes


Diabetes, Non-Insulin dep


HEENT:  No


Cancer:  No


Psychosocial:  Yes


Anxiety


Integumentary:  Yes (CHRONIC LEG EDEMA AND CELLULITIS ; VASCULITIS)


Blood Disorders:  No


 (RICH EL MED STUDENT)





Family Medical History


Reviewed Nursing Family Hx


 (CARLTON SCRUGGS MD)


Heart Disease, Cancer, Diabetes


 (RICH EL MED STUDENT)





Physical Exam-Suspected Sepsis


Physical Exam


Vital Signs





Vital Signs - First Documented








 19





 18:36 20:00


 


Temp 38.1 


 


Pulse 121 


 


Resp 16 


 


B/P (MAP) 145/79 (101) 


 


Pulse Ox 97 


 


O2 Delivery Room Air 


 


O2 Flow Rate  2.00





 (CARLTON SCRUGGS MD)


Vital Signs


Capillary Refill : Less Than 3 Seconds 


 (RICH EL MED STUDENT)








Blood Pressure Mean:                    101








Height, Weight, BMI


Height: 5'3.00"


Weight: 159lbs. 0oz. [id number]; 25.00 BMI


Method:Stated


General Appearance:  No Apparent Distress, WD/WN


Eyes:  Bilateral Eye PERRL, Bilateral Eye EOMI


HEENT:  TMs Normal, Other (dry mucous membranes, erythematous turbinates )


Neck:  Non Tender, Supple


Respiratory:  Chest Non Tender, Lungs Clear, Normal Breath Sounds, No Accessory 

Muscle Use, No Respiratory Distress


Cardiovascular:  No Edema, No Gallop, No Murmur, Tachycardia


Gastrointestinal:  Non Tender, Soft


Back:  No CVA Tenderness, No Vertebral Tenderness


Neurologic/Psychiatric:  Oriented x3, Other (pt nods in and out of sleep during 

interview )


Skin:  normal color, warm/dry


Lymphatic:  No Adenopathy (supra/infraclavicular, A/P cervical) 

(RICH EL,MED STUDENT)


General Appearance:  No Apparent Distress, WD/WN


HEENT:  TMs Normal, Pharynx Normal, Other (dry mucous membranes, erythematous 

turbinates )


Neck:  Non Tender, Supple


Respiratory:  Lungs Clear, Normal Breath Sounds


Cardiovascular:  No Murmur, Tachycardia


Gastrointestinal:  Non Tender, Soft


Back:  No CVA Tenderness, No Vertebral Tenderness


Extremity:  Normal Range of Motion, Non Tender


Neurologic/Psychiatric:  Alert, Other (pt nods in and out of sleep during 

interview. Nose self and place but does seem somewhat confused and a bit 

drowsy.)


Skin:  warm/dry, other (venous stasis changes bilateral lower extremities) 

(CARLTON SCRUGGS MD)





Focused Exam


Lactate Level


19 19:35: Lactic Acid Level 1.41


 (CARLTON SCRUGGS MD)


Lactic Acid Level





Laboratory Tests








Test


 19


19:35


 


Lactic Acid Level


 1.41 MMOL/L


(0.50-2.00)





 (CARLTON SCRUGGS MD)





Progress/Results/Core Measures


Suspected Sepsis


Recent Fever Within 48 Hours:  Yes


Infection Criteria Present:  Suspected New Infection


New/Unexplained  Altered Menta:  No


Sepsis Screen:  Possible Severe Sepsis Risk


SIRS


Temperature: 


Pulse: 121 


Respiratory Rate: 16


 


Laboratory Tests


19 19:35: White Blood Count 7.7


20 04:55: White Blood Count 5.0


20 05:40: White Blood Count 2.8L


Blood Pressure 145 /79 


Mean: 101


 





19 19:35: Lactic Acid Level 1.41








Laboratory Tests


19 19:35: 


Creatinine 1.00, INR Comment 1.3, Platelet Count 172, Total Bilirubin 1.1H


20 04:55: 


Creatinine 0.80, Platelet Count 127L, Total Bilirubin 1.1H


20 05:40: 


Creatinine 0.73, INR Comment 1.3, Platelet Count 135, Total Bilirubin 0.4


 (RICH EL,MED STUDENT)





Results/Orders


Lab Results





Laboratory Tests








Test


 19


19:35 Range/Units


 


 


White Blood Count


 7.7 


 4.3-11.0


10^3/uL


 


Red Blood Count


 3.04 L


 4.35-5.85


10^6/uL


 


Hemoglobin 8.2 L 11.5-16.0  G/DL


 


Hematocrit 26 L 35-52  %


 


Mean Corpuscular Volume 86  80-99  FL


 


Mean Corpuscular Hemoglobin 27  25-34  PG


 


Mean Corpuscular Hemoglobin


Concent 31 L


 32-36  G/DL





 


Red Cell Distribution Width 16.0 H 10.0-14.5  %


 


Platelet Count


 172 


 130-400


10^3/uL


 


Mean Platelet Volume 10.6 H 7.4-10.4  FL


 


Neutrophils (%) (Auto) 91 H 42-75  %


 


Lymphocytes (%) (Auto) 4 L 12-44  %


 


Monocytes (%) (Auto) 5  0-12  %


 


Eosinophils (%) (Auto) 0  0-10  %


 


Basophils (%) (Auto) 0  0-10  %


 


Neutrophils # (Auto) 7.0  1.8-7.8  X 10^3


 


Lymphocytes # (Auto) 0.3 L 1.0-4.0  X 10^3


 


Monocytes # (Auto) 0.4  0.0-1.0  X 10^3


 


Eosinophils # (Auto)


 0.0 


 0.0-0.3


10^3/uL


 


Basophils # (Auto)


 0.0 


 0.0-0.1


10^3/uL


 


Neutrophils % (Manual) 85   %


 


Lymphocytes % (Manual) 3   %


 


Monocytes % (Manual) 7   %


 


Eosinophils % (Manual) 0   %


 


Basophils % (Manual) 0   %


 


Band Neutrophils 4   %


 


Reactive Lymphocytes 1   %


 


Polychromasia SLIGHT   


 


Hypochromasia SLIGHT   


 


Anisocytosis SLIGHT   


 


Microcytosis SLIGHT   


 


Tear Drop Cells SLIGHT   


 


Elliptocytes SLIGHT   


 


Prothrombin Time 16.3 H 12.2-14.7  SEC


 


INR Comment 1.3  0.8-1.4  


 


Activated Partial


Thromboplast Time 31 


 24-35  SEC





 


Sodium Level 133 L 135-145  MMOL/L


 


Potassium Level 3.7  3.6-5.0  MMOL/L


 


Chloride Level 102    MMOL/L


 


Carbon Dioxide Level 20 L 21-32  MMOL/L


 


Anion Gap 11  5-14  MMOL/L


 


Blood Urea Nitrogen 16  7-18  MG/DL


 


Creatinine


 1.00 


 0.60-1.30


MG/DL


 


Estimat Glomerular Filtration


Rate 57 


  





 


BUN/Creatinine Ratio 16   


 


Glucose Level 205 H   MG/DL


 


Lactic Acid Level


 1.41 


 0.50-2.00


MMOL/L


 


Calcium Level 8.9  8.5-10.1  MG/DL


 


Corrected Calcium 9.4  8.5-10.1  MG/DL


 


Total Bilirubin 1.1 H 0.1-1.0  MG/DL


 


Aspartate Amino Transf


(AST/SGOT) 24 


 5-34  U/L





 


Alanine Aminotransferase


(ALT/SGPT) 20 


 0-55  U/L





 


Alkaline Phosphatase 97    U/L


 


Total Protein 6.6  6.4-8.2  GM/DL


 


Albumin 3.4  3.2-4.5  GM/DL





 (CARLTON SCRUGGS MD)


Micro Results





Microbiology


19 Influenza Types A,B Antigen (JESSICA) - Final, Complete


           


 (CARLTON SCRUGGS MD)


My Orders





Orders - CARLTON SCRUGGS MD


Influenza A And B Antigens (19 18:53)


Cbc With Automated Diff (19:)


Comprehensive Metabolic Panel (19:)


Blood Culture (19:)


Sputum Culture (19)


Urinalysis (19:)


Urine Culture (19:)


Protime With Inr (19:)


Partial Thromboplastin Time (19:)


Chest 1 View, Ap/Pa Only (19:)


Ed Iv/Invasive Line Start (19:01)


Ed Iv/Invasive Line Start (19:)


Vital Signs Adult Sepsis Patie Q15M (19:)


O2 (19:)


Remove Rings In Anticipation O (19:)


Lactic Acid Analyzer (19:)


Ed Iv/Invasive Line Start (19:)


Lactated Ringers (Lr 1000 Ml Iv Solution (19 19:01)


Acetaminophen Oral Solution (Tylenol Ora (19 19:01)


Manual Differential (19:35)


Cefepime Injection (Maxipime Injection) (19:45)


 (CARLTON SCRUGGS MD)


Medications Given in ED





Current Medications








 Medications  Dose


 Ordered  Sig/Irving


 Route  Start Time


 Stop Time Status Last Admin


Dose Admin


 


 Lactated Ringer's  1,000 ml @ 


 0 mls/hr  Q0M ONCE


 IV  19 19:01


 19 19:04 DC 19 19:45


1,000 MLS/HR





 (CARLTON SCRUGGS MD)


Vital Signs/I&O











 19





 18:36 19:45 20:00


 


Temp 38.1 37.9 


 


Pulse 121  


 


Resp 16  


 


B/P (MAP) 145/79 (101)  


 


Pulse Ox 97  96


 


O2 Delivery Room Air  Nasal Cannula


 


O2 Flow Rate   2.00





 (CARLTON SCRUGGS MD)


Vital Signs/I&O


Capillary Refill : Less Than 3 Seconds 


 (RICH EL,MED STUDENT)








Blood Pressure Mean:                    101








Progress Note :  


   Time:  19:24


Progress Note


Seen and evaluated. Beginning sepsis workup. Will swab for flu and admin 1L LR. 


 (RICH ELMED STUDENT)


Progress Note :  


Progress Note


I have seen and evaluated the patient and agree with above except as indicated. 

I have directed the plan of care. Evaluation as above. Sepsis order set 

initiated. LR 1 L bolus ordered. Influenza screen ordered. Monitor patient. 

: Left hilar pneumonia noted. Lactic acid okay. While findings of sepsis 

there is no findings of severe sepsis or septic shock and patient doesn't 

require high-volume fluid resuscitation. We will initiate antibiotics and 

admission. : I did discuss the case with Dr. Luna. She accepts patient for

admission, inpatient status. We will initiate cefepime 1 g IV now. We well 

continue hydration in the hospital and repeat labs in the a.m. All findings and 

concerns discussed with patient and family who agree with plan. Patient doing 

better overall now.


 (CARLTON SCRUGGS MD)





Departure


Communication (Admissions)


Time/Spoke to Admitting Phy:  20:28


 (CARLTON SCRUGGS MD)





Impression





   Primary Impression:  


   Pneumonia involving left lung


   Qualified Codes:  J18.9 - Pneumonia, unspecified organism


Disposition:   ADMITTED AS INPATIENT


Condition:  Stable





Admissions


Decision to Admit Reason:  Admit from ER (General)


Decision to Admit/Date:  Dec 31, 2019


Time/Decision to Admit Time:  20:28


 (CARLTON SCRUGGS MD)





Departure-Patient Inst.


Referrals:  


[facility] (PCP/Family)


Primary Care Physician











RICH EL MED STUDENT   Dec 31, 2019 19:20


CARLTON SCRUGGS MD          Dec 31, 2019 20:27

## 2020-01-01 VITALS — SYSTOLIC BLOOD PRESSURE: 105 MMHG | DIASTOLIC BLOOD PRESSURE: 64 MMHG

## 2020-01-01 VITALS — SYSTOLIC BLOOD PRESSURE: 118 MMHG | DIASTOLIC BLOOD PRESSURE: 64 MMHG

## 2020-01-01 VITALS — SYSTOLIC BLOOD PRESSURE: 114 MMHG | DIASTOLIC BLOOD PRESSURE: 60 MMHG

## 2020-01-01 VITALS — DIASTOLIC BLOOD PRESSURE: 73 MMHG | SYSTOLIC BLOOD PRESSURE: 114 MMHG

## 2020-01-01 VITALS — SYSTOLIC BLOOD PRESSURE: 110 MMHG | DIASTOLIC BLOOD PRESSURE: 73 MMHG

## 2020-01-01 VITALS — DIASTOLIC BLOOD PRESSURE: 66 MMHG | SYSTOLIC BLOOD PRESSURE: 99 MMHG

## 2020-01-01 VITALS — SYSTOLIC BLOOD PRESSURE: 124 MMHG | DIASTOLIC BLOOD PRESSURE: 68 MMHG

## 2020-01-01 VITALS — DIASTOLIC BLOOD PRESSURE: 64 MMHG | SYSTOLIC BLOOD PRESSURE: 118 MMHG

## 2020-01-01 LAB
ALBUMIN SERPL-MCNC: 3 GM/DL (ref 3.2–4.5)
ALP SERPL-CCNC: 96 U/L (ref 40–136)
ALT SERPL-CCNC: 17 U/L (ref 0–55)
BASOPHILS # BLD AUTO: 0 10^3/UL (ref 0–0.1)
BASOPHILS NFR BLD AUTO: 0 % (ref 0–10)
BILIRUB SERPL-MCNC: 1.1 MG/DL (ref 0.1–1)
BUN/CREAT SERPL: 24
CALCIUM SERPL-MCNC: 8.6 MG/DL (ref 8.5–10.1)
CHLORIDE SERPL-SCNC: 107 MMOL/L (ref 98–107)
CO2 SERPL-SCNC: 18 MMOL/L (ref 21–32)
CREAT SERPL-MCNC: 0.8 MG/DL (ref 0.6–1.3)
EOSINOPHIL # BLD AUTO: 0 10^3/UL (ref 0–0.3)
EOSINOPHIL NFR BLD AUTO: 0 % (ref 0–10)
ERYTHROCYTE [DISTWIDTH] IN BLOOD BY AUTOMATED COUNT: 16 % (ref 10–14.5)
GFR SERPLBLD BASED ON 1.73 SQ M-ARVRAT: > 60 ML/MIN
GLUCOSE SERPL-MCNC: 143 MG/DL (ref 70–105)
HCT VFR BLD CALC: 26 % (ref 35–52)
HGB BLD-MCNC: 8 G/DL (ref 11.5–16)
LYMPHOCYTES # BLD AUTO: 0.3 X 10^3 (ref 1–4)
LYMPHOCYTES NFR BLD AUTO: 5 % (ref 12–44)
MANUAL DIFFERENTIAL PERFORMED BLD QL: NO
MCH RBC QN AUTO: 27 PG (ref 25–34)
MCHC RBC AUTO-ENTMCNC: 31 G/DL (ref 32–36)
MCV RBC AUTO: 87 FL (ref 80–99)
MONOCYTES # BLD AUTO: 0.4 X 10^3 (ref 0–1)
MONOCYTES NFR BLD AUTO: 7 % (ref 0–12)
NEUTROPHILS # BLD AUTO: 4.4 X 10^3 (ref 1.8–7.8)
NEUTROPHILS NFR BLD AUTO: 87 % (ref 42–75)
PLATELET # BLD: 127 10^3/UL (ref 130–400)
PMV BLD AUTO: 10.1 FL (ref 7.4–10.4)
POTASSIUM SERPL-SCNC: 3.7 MMOL/L (ref 3.6–5)
PROT SERPL-MCNC: 6 GM/DL (ref 6.4–8.2)
SODIUM SERPL-SCNC: 135 MMOL/L (ref 135–145)
WBC # BLD AUTO: 5 10^3/UL (ref 4.3–11)

## 2020-01-01 RX ADMIN — SODIUM CHLORIDE SCH MLS/HR: 900 INJECTION INTRAVENOUS at 19:49

## 2020-01-01 RX ADMIN — ENOXAPARIN SODIUM SCH MG: 100 INJECTION SUBCUTANEOUS at 16:53

## 2020-01-01 RX ADMIN — IPRATROPIUM BROMIDE AND ALBUTEROL SULFATE SCH ML: .5; 3 SOLUTION RESPIRATORY (INHALATION) at 09:14

## 2020-01-01 RX ADMIN — SODIUM CHLORIDE SCH MLS/HR: 900 INJECTION INTRAVENOUS at 13:27

## 2020-01-01 RX ADMIN — DOCUSATE SODIUM AND SENNOSIDES SCH EA: 8.6; 5 TABLET, FILM COATED ORAL at 19:49

## 2020-01-01 RX ADMIN — IPRATROPIUM BROMIDE AND ALBUTEROL SULFATE SCH ML: .5; 3 SOLUTION RESPIRATORY (INHALATION) at 19:54

## 2020-01-01 RX ADMIN — SODIUM CHLORIDE SCH MLS/HR: 900 INJECTION INTRAVENOUS at 08:15

## 2020-01-01 RX ADMIN — SODIUM CHLORIDE, SODIUM LACTATE, POTASSIUM CHLORIDE, AND CALCIUM CHLORIDE SCH MLS/HR: 600; 310; 30; 20 INJECTION, SOLUTION INTRAVENOUS at 07:02

## 2020-01-01 RX ADMIN — SODIUM CHLORIDE SCH MLS/HR: 900 INJECTION INTRAVENOUS at 02:29

## 2020-01-01 RX ADMIN — SODIUM CHLORIDE, SODIUM LACTATE, POTASSIUM CHLORIDE, AND CALCIUM CHLORIDE SCH MLS/HR: 600; 310; 30; 20 INJECTION, SOLUTION INTRAVENOUS at 13:29

## 2020-01-01 RX ADMIN — IPRATROPIUM BROMIDE AND ALBUTEROL SULFATE SCH ML: .5; 3 SOLUTION RESPIRATORY (INHALATION) at 18:35

## 2020-01-01 RX ADMIN — IBUPROFEN PRN MG: 200 TABLET, FILM COATED ORAL at 08:28

## 2020-01-01 RX ADMIN — IBUPROFEN PRN MG: 200 TABLET, FILM COATED ORAL at 20:51

## 2020-01-01 NOTE — HISTORY & PHYSICAL-HOSPITALIST
History of Present Illness


HPI/Chief Complaint


CC: PNA





HPI: This is a 59yo[facility] clinic patient of Dr Dot Hughes who has a h/o ESLD from

HCV who presented to the ER with fever and cough and found to have PNA in need 

of hospitalization due to liver disease. She reports she had a PNA 2 weeks ago 

and was in the hospital and feels like she did 2 weeks ago. Patient appears to 

be jaundiced and very end stage liver disease. I reviewed the extensive problem 

list from hospital stay 2019 for left leg cellulitis.


Source:  patient, RN/MD, old records


Exam Limitations:  no limitations


Date Seen


20


Time Seen by a Provider:  10:30


Attending Physician


Chen Luna DO


[facility] [place]


Referring Physician





Date of Admission


Dec 31, 2019 at 20:28





Home Medications & Allergies


Home Medications


Reviewed patient Home Medication Reconciliation performed by pharmacy medication

reconciliations technician and/or nursing.


Patients Allergies have been reviewed.





Allergies





Allergies


Coded Allergies


  hydrocodone (Verified Allergy, Unknown, 1/10/19)


  hydrochlorothiazide (Verified Adverse Reaction, Unknown, DIARRHEA, 19)








Past Medical-Social-Family Hx


Past Med/Social Hx:  Reviewed Nursing Past Med/Soc Hx, Reviewed and Corrections 

made


Patient Social History


Marrital Status:  cohabiting


Employed/Student:  unemployed


Alcohol Use:  Denies Use


Recreational Drug Use:  Yes (PAST HX METH)


Drug of Choice:  + IV METH USE


Smoking Status:  Current Everyday Smoker


Type Used:  Cigarettes


2nd Hand Smoke Exposure:  Yes


Recent Foreign Travel:  No


Contact w/other who traveled:  No


Recent Hopitalizations:  No


Recent Infectious Disease Expo:  No





Immunizations Up To Date


Tetanus Booster (TDap):  More than 5yrs


Date of Influenza Vaccine:  Oct 1, 2019





Seasonal Allergies


Seasonal Allergies:  No





Past Medical History


Surgeries:   Section, Renal, Tubal Ligation


Cardiac:  Chronic Edema/Swelling, Hypertension, Peripheral Vascular


Neurological:  Neuropathy


Menopausal


Genitourinary:  Kidney Stones


Gastrointestinal:  Hepatitis, Cirrhosis


Musculoskeletal:  Arthritis, Rheumatoid Arthritis


Endocrine:  Diabetes, Non-Insulin dep


Psychosocial:  Anxiety


History of Blood Disorders:  No





Family History


Reviewed Nursing Family Hx


Heart Disease, Cancer, Diabetes





Review of Systems


Constitutional:  see HPI, dizziness, fever, malaise, weakness


Respiratory:  cough, dyspnea on exertion


Psychiatric/Neurological:  Anxiety, Depressed


All Other Systems Reviewed


Negative Unless Noted:  Yes





Physical Exam


Physical Exam


Vital Signs





Vital Signs - First Documented








 12/31/19 12/31/19 1/1/20





 18:36 20:00 03:02


 


Temp 38.1  


 


Pulse 121  


 


Resp 16  


 


B/P (MAP) 145/79 (101)  


 


Pulse Ox 97  


 


O2 Delivery Room Air  


 


O2 Flow Rate  2.00 


 


FiO2   28





Capillary Refill : Less Than 3 Seconds


Height, Weight, BMI


Height: 5'3.00"


Weight: 159lbs. 0oz. 72.035941pz; 27.16 BMI


Method:Stated


General Appearance:  Anxious, Chronically ill, Mild Distress, Thin, Other 

(jaundiced)


Eyes:  Right Eye Normal Inspection, Right Eye PERRL


HEENT:  PERRL/EOMI, Normal ENT Inspection, Pharynx Normal, Moist Mucous 

Membranes


Neck:  Full Range of Motion, Normal Inspection, Non Tender


Respiratory:  Chest Non Tender, No Accessory Muscle Use, No Respiratory 

Distress, Crackles, Decreased Breath Sounds


Cardiovascular:  Regular Rate, Rhythm, No Edema, No Gallop, No JVD, No Murmur, 

Normal Peripheral Pulses


Gastrointestinal:  Normal Bowel Sounds, No Organomegaly, No Pulsatile Mass, Non 

Tender, Soft, Other (fluid wave)


Back:  Normal Inspection, No CVA Tenderness, No Vertebral Tenderness


Extremity:  Normal Capillary Refill, Normal Inspection, Normal Range of Motion, 

Non Tender, No Calf Tenderness, No Pedal Edema


Neurologic/Psychiatric:  Alert, Oriented x3, No Motor/Sensory Deficits, Normal 

Mood/Affect


Skin:  Normal Color, Warm/Dry


Lymphatic:  No Adenopathy





Results


Results/Procedures


Labs


Laboratory Tests


19 19:35








20 04:55








Patient resulted labs reviewed.





Assessment/Plan


Admission Diagnosis


Assessment:


PNA


ESLD


Meth user


Anemia chronic


Jaundice





Plan:


Abx


Supportive care


Home meds


Admission Status:  Inpatient Order (span 2 midnights)


Reason for Inpatient Admission:  


PNA with ESLD





Diagnosis/Problems


Diagnosis/Problems





(1) Pneumonia involving left lung


Status:  Acute


Qualifiers:  


   Pneumonia type:  due to unspecified organism  Lung location:  unspecified 

part of lung  Qualified Codes:  J18.9 - Pneumonia, unspecified organism


(2) chronic leg wound


(3) NIDDM


(4) Chronic hepatitis C


Status:  Chronic


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


(6) Diabetes


Status:  Chronic


(7) Rheumatoid arthritis


Status:  Chronic


(8) Pancytopenia


Status:  Acute


(9) DVT prophylaxis


Status:  Acute


(10) Hypertension


Status:  Chronic


(11) Illicit drug use


Status:  Acute


(12) Methamphetamine use


Status:  Acute





Clinical Quality Measures


DVT/VTE Risk/Contraindication:


Risk Factor Score Per Nursin


RFS Level Per Nursing on Admit:  4+=Very High











CHEN LUNA DO                 2020 12:31

## 2020-01-02 VITALS — DIASTOLIC BLOOD PRESSURE: 60 MMHG | SYSTOLIC BLOOD PRESSURE: 121 MMHG

## 2020-01-02 VITALS — SYSTOLIC BLOOD PRESSURE: 115 MMHG | DIASTOLIC BLOOD PRESSURE: 62 MMHG

## 2020-01-02 VITALS — DIASTOLIC BLOOD PRESSURE: 58 MMHG | SYSTOLIC BLOOD PRESSURE: 118 MMHG

## 2020-01-02 VITALS — SYSTOLIC BLOOD PRESSURE: 114 MMHG | DIASTOLIC BLOOD PRESSURE: 56 MMHG

## 2020-01-02 VITALS — DIASTOLIC BLOOD PRESSURE: 62 MMHG | SYSTOLIC BLOOD PRESSURE: 123 MMHG

## 2020-01-02 LAB
ALBUMIN SERPL-MCNC: 2.8 GM/DL (ref 3.2–4.5)
ALP SERPL-CCNC: 83 U/L (ref 40–136)
ALT SERPL-CCNC: 15 U/L (ref 0–55)
AMMONIA PLAS-SCNC: 46 UMOL/L (ref 11–32)
BASOPHILS # BLD AUTO: 0 10^3/UL (ref 0–0.1)
BASOPHILS NFR BLD AUTO: 0 % (ref 0–10)
BILIRUB SERPL-MCNC: 0.4 MG/DL (ref 0.1–1)
BUN/CREAT SERPL: 41
CALCIUM SERPL-MCNC: 8.1 MG/DL (ref 8.5–10.1)
CHLORIDE SERPL-SCNC: 109 MMOL/L (ref 98–107)
CO2 SERPL-SCNC: 19 MMOL/L (ref 21–32)
CREAT SERPL-MCNC: 0.73 MG/DL (ref 0.6–1.3)
EOSINOPHIL # BLD AUTO: 0.1 10^3/UL (ref 0–0.3)
EOSINOPHIL NFR BLD AUTO: 2 % (ref 0–10)
ERYTHROCYTE [DISTWIDTH] IN BLOOD BY AUTOMATED COUNT: 16.6 % (ref 10–14.5)
GFR SERPLBLD BASED ON 1.73 SQ M-ARVRAT: > 60 ML/MIN
GLUCOSE SERPL-MCNC: 147 MG/DL (ref 70–105)
HCT VFR BLD CALC: 24 % (ref 35–52)
HGB BLD-MCNC: 7.1 G/DL (ref 11.5–16)
INR PPP: 1.3 (ref 0.8–1.4)
LYMPHOCYTES # BLD AUTO: 0.5 X 10^3 (ref 1–4)
LYMPHOCYTES NFR BLD AUTO: 16 % (ref 12–44)
MANUAL DIFFERENTIAL PERFORMED BLD QL: NO
MCH RBC QN AUTO: 27 PG (ref 25–34)
MCHC RBC AUTO-ENTMCNC: 30 G/DL (ref 32–36)
MCV RBC AUTO: 89 FL (ref 80–99)
MONOCYTES # BLD AUTO: 0.2 X 10^3 (ref 0–1)
MONOCYTES NFR BLD AUTO: 6 % (ref 0–12)
NEUTROPHILS # BLD AUTO: 2.1 X 10^3 (ref 1.8–7.8)
NEUTROPHILS NFR BLD AUTO: 75 % (ref 42–75)
PLATELET # BLD: 135 10^3/UL (ref 130–400)
PMV BLD AUTO: 10.7 FL (ref 7.4–10.4)
POTASSIUM SERPL-SCNC: 3.6 MMOL/L (ref 3.6–5)
PROT SERPL-MCNC: 5.5 GM/DL (ref 6.4–8.2)
PROTHROMBIN TIME: 16.3 SEC (ref 12.2–14.7)
SODIUM SERPL-SCNC: 139 MMOL/L (ref 135–145)
WBC # BLD AUTO: 2.8 10^3/UL (ref 4.3–11)

## 2020-01-02 RX ADMIN — IPRATROPIUM BROMIDE AND ALBUTEROL SULFATE SCH ML: .5; 3 SOLUTION RESPIRATORY (INHALATION) at 16:20

## 2020-01-02 RX ADMIN — IPRATROPIUM BROMIDE AND ALBUTEROL SULFATE SCH ML: .5; 3 SOLUTION RESPIRATORY (INHALATION) at 21:40

## 2020-01-02 RX ADMIN — DOCUSATE SODIUM AND SENNOSIDES SCH EA: 8.6; 5 TABLET, FILM COATED ORAL at 08:29

## 2020-01-02 RX ADMIN — IPRATROPIUM BROMIDE AND ALBUTEROL SULFATE SCH ML: .5; 3 SOLUTION RESPIRATORY (INHALATION) at 08:38

## 2020-01-02 RX ADMIN — DOCUSATE SODIUM AND SENNOSIDES SCH EA: 8.6; 5 TABLET, FILM COATED ORAL at 20:12

## 2020-01-02 RX ADMIN — SODIUM CHLORIDE SCH MLS/HR: 900 INJECTION INTRAVENOUS at 13:36

## 2020-01-02 RX ADMIN — SODIUM CHLORIDE, SODIUM LACTATE, POTASSIUM CHLORIDE, AND CALCIUM CHLORIDE SCH MLS/HR: 600; 310; 30; 20 INJECTION, SOLUTION INTRAVENOUS at 13:33

## 2020-01-02 RX ADMIN — SODIUM CHLORIDE, SODIUM LACTATE, POTASSIUM CHLORIDE, AND CALCIUM CHLORIDE SCH MLS/HR: 600; 310; 30; 20 INJECTION, SOLUTION INTRAVENOUS at 00:04

## 2020-01-02 RX ADMIN — IBUPROFEN PRN MG: 200 TABLET, FILM COATED ORAL at 16:19

## 2020-01-02 RX ADMIN — SODIUM CHLORIDE SCH MLS/HR: 900 INJECTION INTRAVENOUS at 08:29

## 2020-01-02 RX ADMIN — SODIUM CHLORIDE SCH MLS/HR: 900 INJECTION INTRAVENOUS at 20:11

## 2020-01-02 RX ADMIN — IPRATROPIUM BROMIDE AND ALBUTEROL SULFATE SCH ML: .5; 3 SOLUTION RESPIRATORY (INHALATION) at 03:13

## 2020-01-02 RX ADMIN — SODIUM CHLORIDE SCH MLS/HR: 900 INJECTION INTRAVENOUS at 01:53

## 2020-01-02 RX ADMIN — ENOXAPARIN SODIUM SCH MG: 100 INJECTION SUBCUTANEOUS at 16:17

## 2020-01-02 NOTE — OCCUPATIONAL THERAPY EVAL
OT Evaluation-General/PLF


Medical Diagnosis


Admission Date


Dec 31, 2019 at 20:28


Medical Diagnosis:  PNA, fever


Onset Date:  Dec 31, 2019





Therapy Diagnosis


Therapy Diagnosis:  Decreased ADL function





Height/Weight


Height (Feet):  5


Height (Inches):  3.00


Weight (Pounds):  159


Weight (Ounces):  0





Precautions


Precautions/Isolations:  Fall Prevention, Standard Precautions, Pressure Ulcer





Weight Bear Status


Weight Bearing Restriction:  Weight Bearing/Tolerated





Referral


Physician:  Chen Luna


Referral Reason:  Activity Tolerance, Self Care, Evaluation/Treatment, 

Strengthening/ROM





Medical History


Additional Medical History


chronic edema, HTN, PVD, nuropathy, kidney stones, hepatitis, cirrhosis, 

arthritis, RA, NID DM, anxiety, Meth use.


Current History


Per H&P: This is a 59yoWF clinic patient of Dr Dot Hughes who has a h/o ESLD 

from HCV who presented to the ER with fever and cough and found to have PNA in 

need of hospitalization due to liver disease. She reports she had a PNA 2 weeks 

ago and was in the hospital and feels like she did 2 weeks ago. Patient appears 

to be jaundiced and very end stage liver disease. I reviewed the extensive 

problem list from hospital stay 11/2019 for left leg cellulitis.


Reviewed History:  Yes





Social History


Home:  Single Level


Current Living Status:  Significant Other


Entry Into Home:  Stairs Without Railing


 Steps Into Home:  2





ADL-Prior Level of Function


SCALE: Activities may be completed with or without assistive devices.





6-Indepedent-patient completes the activity by him/herself with no assistance 

from a helper.


5-Set-up or Clean-up Assistance-helper sets up or cleans up; patient completes 

activity. [place] assists only prior to or  


    following the activity.


4-Supervision or Touching Assistance-helper provides verbal cues and/or 

touching/steadying and/or contact guard assistance as patient completes 

activity. Assistance may be provided   


    throughout the activity or intermittently.


3-Partial/Moderate Assistance-helper does LESS THAN HALF the effort. [place] 

lifts, holds or supports trunk or limbs, but provides less than half the effort.


2-Substantial/Maximal Assistance-helper does MORE THAN HALF the effort. [place] 

lifts or holds trunk or limbs and provides more than half the effort.


9-[id number] does ALL the effort. Patient does none of the effort to 

complete the activity. Or, the assistance of 2 or more helpers is required for 

the patient to complete the  


    activity.


If activity was not attempted, code reason:


7-Patient Refused.


9-Not Applicable-not attempted and the patient did not perform the activity 

before the current illness, exacerbation or injury.


10-Not Attempted due to Environmental Limitations-(lack of equipment, weather 

restraints, etc.).


88-Not Attempted due to Medical Conditions or Safety Concerns.


ADL PLOF Comments


Pt states IND without AE


Self Care:  Independent


Functional Cognition:  Independent


DME/Equipment Comments


No DME


Occupation:  [employer]


Drive Self:  Yes





OT Current Status


Subjective


Pt seen in bed, denies pain or SOB, states a bit of fatigue. Pt agreeable to OT 

eval/ treat.





Mental Status/Objective


Patient Orientation:  Person, Place, Situation, Normal For Age


Attachments:  IV





Current


Glasses/Contacts:  No


Hearing Aids:  No


Dentures/Partials:  No


Hand Dominance:  Right


Upper Extremity ROM


WFL BUE


Upper Extremity Coordination


WFL BUE


Upper Extremity Sensation


Denies paresthesias.


Upper Extremity Strength


WFL (4/5 BUE)





ADL-Treatment


Eating (QC):  6


Oral Hygiene (QC):  7


Shower/Bathe Self (QC):  7


Upper Body Dressing (QC):  6 (per pt)


Lower Body Dressing (QC):  6 (per pt)


On/Off Footwear (QC):  6





Other Treatments


Pt seen in bed, educated of OT role and educated on benefits of sitting 

throughout the day due to pneumonia dx. Pt agrees to sit in recliner chair, bed 

mob and sit to stand with IND without AE. Pt sits in recliner, completes eval 

and states IND with all ADLs, denies showering as she is waiting on  with

clothing items. Pt states annoyance with IV within neck and tape placement, pt 

notified to tell nursing. No skilled OT needed. Pt left in recliner chair with 

call light in reach, all needs met.





Education


OT Patient Education:  Correct positioning, Safety issues


Teaching Recipient:  Patient


Teaching Methods:  Demonstration, Discussion


Response to Teaching:  Verbalize Understanding, Return Demonstration





OT Long Term Goals


Long Term Goals


1=Demonstrate adherence to instructed precautions during ADL tasks.


2=Patient will verbalize/demonstrate understanding of assistive 

devices/modifications for ADL.


3=Patient will improve strength/tolerance for activity to enable patient to 

perform ADL's.





OT Education/Plan


Problem List/Assessment


Assessment:  No Skilled OT Needs ID'd





Discharge Recommendations


Plan/Recommendations:  Discharge/Goals Met





Treatment Plan/Plan of Care


Treatment,Training & Education:  Yes


Plan of Care:  OTHER (eval only)


Treatment Duration:  Jan 2, 2020


Frequency:  1 time per week (eval only)





Time/GCodes


Start Time:  11:00


Stop Time:  11:09


Total Time Billed (hr/min):  9


Billed Treatment Time


1, EVL (9)











ROSSANA LANG OTR                 Jan 2, 2020 11:50

## 2020-01-02 NOTE — PROGRESS NOTE - HOSPITALIST
RICH EL,MED STUDENT 20 1139:


Subjective


HPI/CC On Admission


Date Seen by Provider:  2020


Time Seen by Provider:  08:44


CC: PNA





HPI: This is a 59yoWF clinic patient of Dr Dot Hughes who has a h/o ESLD from

[facility] who presented to the ER with fever and cough and found to have PNA in need 

of hospitalization due to liver disease. She reports she had a PNA 2 weeks ago 

and was in the hospital and feels like she did 2 weeks ago. Patient appears to 

be jaundiced and very end stage liver disease. I reviewed the extensive problem 

list from hospital stay 2019 for left leg cellulitis.


Subjective/Events-last exam


Pt feeling much better today, no longer confused/somnolent 


Still complains of cough and fever


Denies chills, headache, constipation, diarrhea 


Would like to go home tomorrow 


Requesting work note





Focused Exam


Lactate Level


19 19:35: Lactic Acid Level 1.41








Objective


Exam


Vital Signs





Vital Signs








  Date Time  Temp Pulse Resp B/P (MAP) Pulse Ox O2 Delivery O2 Flow Rate FiO2


 


20 08:38     97 Room Air  


 


20 08:00 36.4 98 16 118/58 (78)    


 


20 19:54       1.00 


 


20 03:02        28





Capillary Refill : NONE


General Appearance:  No Apparent Distress, WD/WN


HEENT:  Pharynx Normal, Moist Mucous Membranes


Neck:  Non Tender, Supple


Respiratory:  Chest Non Tender, Normal Breath Sounds, No Accessory Muscle Use, 

No Respiratory Distress, Crackles


Cardiovascular:  Regular Rate, Rhythm, No Edema, No Gallop, No Murmur, Normal 

Peripheral Pulses


Gastrointestinal:  Non Tender, Soft


Extremity:  No Calf Tenderness, No Pedal Edema


Neurologic/Psychiatric:  Alert, Oriented x3


Skin:  Normal Color, Cool, Damp


Lymphatic:  No Adenopathy (supra/infraclavicular, A/P cervical )





Results/Procedures


Lab


Laboratory Tests


20 05:40








Patient resulted labs reviewed.





Assessment/Plan


Assessment and Plan


Assess & Plan/Chief Complaint


Assessment: 


Hilar pneumonia


Altered mental status - resolved 


Cirrhosis 


Hep C positive 


Anemia 


mild hyperammonemia 





Plan: 


continue IV abx 


will consider discharge with oral abx tomorrow





Clinical Quality Measures


DVT/VTE Risk/Contraindication:


Risk Factor Score Per Nursin


RFS Level Per Nursing on Admit:  4+=Very High





CHEN LUNA DO 20 1612:


Subjective


Subjective/Events-last exam


Coarseness remains but much improved lungs. 


Needs a work slip, she works for [employer]. 


PT and OT will be ordered. 


Will discharge to home on Friday.


Review of Systems


General:  Fatigue


Pulmonary:  Cough





Objective


Exam


General Appearance:  No Apparent Distress, WD/WN, Chronically ill


Respiratory:  Chest Non Tender, Lungs Clear, Normal Breath Sounds, No Accessory 

Muscle Use, No Respiratory Distress, Decreased Breath Sounds


Cardiovascular:  Regular Rate, Rhythm, No Edema, No Gallop, No JVD, No Murmur, 

Normal Peripheral Pulses


Neurologic/Psychiatric:  Alert, Oriented x3, No Motor/Sensory Deficits, Normal 

Mood/Affect





Assessment/Plan


Assessment and Plan


Assess & Plan/Chief Complaint


Discharge home tomorrow


Initiate PT and OT





Diagnosis/Problems


Diagnosis/Problems





(1) Pneumonia involving left lung


Status:  Acute


Qualifiers:  


   Qualified Codes:  J18.9 - Pneumonia, unspecified organism


(2) Diabetes


Status:  Chronic


(3) NIDDM


(4) IV drug abuse


Status:  Acute





Supervisory-Addendum Brief


Verification & Attestation


Participated in pt care:  history, MDM, physical


Personally performed:  exam, history, MDM, supervision of care


Care discussed with:  Medical Student


Procedures:  n/a


Results interpretation:  Verified all documentation


Verification and Attestation of Medical Student E/M Service





A medical student performed and documented this service in my presence. I 

reviewed and verified all information documented by the medical student and made

modifications to such information, when appropriate. I personally performed the 

physical exam and medical decision making. 





 Chen Luna, 2020,21:09











RICH EL,MED STUDENT    2020 11:39


CHEN LUNA DO                 2020 16:12

## 2020-01-02 NOTE — PHYSICAL THERAPY EVALUATION
PT Evaluation-General


Medical Diagnosis


Admission Date


Dec 31, 2019 at 20:28


Medical Diagnosis:  PNA, fever


Onset Date:  Dec 31, 2019





Therapy Diagnosis


Therapy Diagnosis:  weakness





Height/Weight


Height (Feet):  5


Height (Inches):  3.00


Weight (Pounds):  159


Weight (Ounces):  0





Precautions


Precautions/Isolations:  Fall Prevention, Standard Precautions, Pressure Ulcer





Weight Bear Status


Right Lower Extremity:  Right


Full Weight Bearing


Left Lower Extremity:  Left


Full Weight Bearing





Referral


Physician:  Chen Luna


Reason for Referral:  Evaluation/Treatment





Medical History


Current History


Pt admitted with a dx of pneumonia.


Reviewed History:  Yes





Social History


Home:  Single Level


Current Living Status:  Significant Other


Entry Into Home:  Stairs Without Railing


PT Steps Into Home:  2





Prior


Prior Level of Function


SCALE: Activities may be completed with or without assistive devices.





6-Indepedent-patient completes the activity by him/herself with no assistance 

from a helper.


5-Set-up or Clean-up Assistance-helper sets up or cleans up; patient completes 

activity. [place] assists only prior to or  


    following the activity.


4-Supervision or Touching Assistance-helper provides verbal cues and/or 

touching/steadying and/or contact guard assistance as patient completes 

activity. Assistance may be provided   


    throughout the activity or intermittently.


3-Partial/Moderate Assistance-helper does LESS THAN HALF the effort. [place] 

lifts, holds or supports trunk or limbs, but provides less than half the effort.


2-Substantial/Maximal Assistance-helper does MORE THAN HALF the effort. [place] 

lifts or holds trunk or limbs and provides more than half the effort.


4-Ahraussjo-mxyuov does ALL the effort. Patient does none of the effort to 

complete the activity. Or, the assistance of 2 or more helpers is required for 

the patient to complete the  


    activity.


If activity was not attempted, code reason:


7-Patient Refused.


9-Not Applicable-not attempted and the patient did not perform the activity 

before the current illness, exacerbation or injury.


10-Not Attempted due to Environmental Limitations-(lack of equipment, weather 

restraints, etc.).


88-Not Attempted due to Medical Conditions or Safety Concerns.


Bed Mobility:  6


Transfers (B,C,W/C):  6


Gait:  6


Stairs:  6


Indoor Mobility (Ambulation):  Independent


Stairs:  Independent


Pt works as a private caregiver.  Active and indep with all mobility.





PT Evaluation-Current


Subjective


Agrees to PT.  Reports she has been getting up and down in her room without 

assist.  However, after PT eval, pt fatigued and notes, "I didn't realize I was 

this week."





Pt/Family Goals


pt reports she hope to discharge tomorrow.





Objective


Patient Orientation:  Person, Place, Time, Situation


Attachments:  IV





ROM/Strength


ROM Lower Extremities


WNL


Strength Lower Extremities


WFL





Integumentary/Posture


Integumentary


intact


Bowel Incontinence:  No


Bladder Incontinence:  No


Posture


normal and symmetrical





Neuromuscular


(Tone, Coordination, Reflexes)


intact and functional





Sensory


Vision:  Functional


Hearing:  Functional


Hand Dominance:  Right


Sensation Right Lower Extremit:  Intact


Sensation Left Lower Extremity:  Intact





Transfers


Sit to Stand (QC):  6


Pt up in chair when this PT arrived.  Reports she is able to get in/out of bed 

without assist.





Gait


Does the Patient Walk?:  Yes


Mode of Locomotion:  Walk


Anticipated Mode of Locomotion:  Walk


Walk 150 ft (QC):  5


Comments/Gait Description


pt walked 200 ft without AD without assist; pt fatigued and slightly SOA upon 

return to her room..





Balance


Sitting Static:  Good


Sitting Dynamic:  Good


Standing Static:  Good


 Standing Dynamic:  Good





Assessment/Needs


Pt presents with decreased functional activity tolerance due to pneumonia and 

recent hospital stay.  She is likely to discharge tomorrow, but will follow for 

a few days to ensure she is getting up to move to increase functional strength 

and activity tolerance.


Rehab Potential:  Good





PT Long Term Goals


Long Term Goals


PT Long Term Goals Time Frame:  Jan 9, 2020


Sit to Lying (QC):  6


Lying-Sitting on Side/Bed(QC):  6


Sit to Stand (QC):  6


Walk 150 ft (QC):  6





PT Plan


Problem List


Problem List:  Activity Tolerance, Functional Strength, Safety





Treatment/Plan


Treatment Plan:  Continue Plan of Care


Treatment Plan:  Education, Functional Activity German, Functional Strength, 

Gait, Safety, Therapeutic Exercise, Transfers


Treatment Duration:  Jan 9, 2020


Frequency:  6 times per week


Estimated Hrs Per Day:  .25 hour per day


Patient and/or Family Agrees t:  Yes





Safety Risks/Education


Patient Education:  Safety Issues


Teaching Recipient:  Patient


Teaching Methods:  Discussion


Response to Teaching:  Verbalize Understanding





Time/GCodes


Time In:  1140


Time Out:  1200


Total Billed Treatment Time:  20


Total Billed Treatment


visit


EVM 20











YADIEL JACOBSON PT              Jan 2, 2020 13:33

## 2020-01-02 NOTE — NUR
CM/SS visited with patient to assess needs upon discharge. 



Plan: The patient states that it is planned for a discharge tomorrow. She verbalized that 
she would have a ride for discharge. 



Summary: The patient verbalized that she was just in the hospital a couple of weeks ago but 
with going in and out of the cold she got pneumonia again.The patient has worked at 
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 
take care of older adults. The patient reported that she did not have any needs at this 
time.

## 2020-01-02 NOTE — NUR
WENT OVER THE EXT MED HX WITH THE PATIENT. SHE VERIFIED WHAT SHE IS TAKING HOWEVER SHE IS 
PAST DUE FOR REFILL ON MOST OF HER MEDICATIONS. THESE FILL DATES ARE THE SAME AS SHE HAD THE 
LAST TIME SHE WAS ADMITTED. 



IN ADDITION TO WHAT IS SHOWN ON THE EXT MED HX SHANTA FILLED METHOTREXATE 2.5MG #16 TABS 
FOR 28 DAYS 6-21-19.



SHE STATES SHE STOPPED TAKING THE METFORMIN AND DOES NOT TAKE INSULIN AT HOME. SHE ALSO 
STATES SHE HAS NOT BEEN TAKING FISH OIL OTC RECENTLY.

## 2020-01-03 VITALS — SYSTOLIC BLOOD PRESSURE: 131 MMHG | DIASTOLIC BLOOD PRESSURE: 73 MMHG

## 2020-01-03 VITALS — SYSTOLIC BLOOD PRESSURE: 101 MMHG | DIASTOLIC BLOOD PRESSURE: 49 MMHG

## 2020-01-03 VITALS — SYSTOLIC BLOOD PRESSURE: 119 MMHG | DIASTOLIC BLOOD PRESSURE: 64 MMHG

## 2020-01-03 VITALS — SYSTOLIC BLOOD PRESSURE: 126 MMHG | DIASTOLIC BLOOD PRESSURE: 68 MMHG

## 2020-01-03 VITALS — SYSTOLIC BLOOD PRESSURE: 125 MMHG | DIASTOLIC BLOOD PRESSURE: 75 MMHG

## 2020-01-03 VITALS — SYSTOLIC BLOOD PRESSURE: 102 MMHG | DIASTOLIC BLOOD PRESSURE: 61 MMHG

## 2020-01-03 VITALS — DIASTOLIC BLOOD PRESSURE: 60 MMHG | SYSTOLIC BLOOD PRESSURE: 122 MMHG

## 2020-01-03 VITALS — SYSTOLIC BLOOD PRESSURE: 111 MMHG | DIASTOLIC BLOOD PRESSURE: 58 MMHG

## 2020-01-03 VITALS — DIASTOLIC BLOOD PRESSURE: 63 MMHG | SYSTOLIC BLOOD PRESSURE: 123 MMHG

## 2020-01-03 VITALS — SYSTOLIC BLOOD PRESSURE: 109 MMHG | DIASTOLIC BLOOD PRESSURE: 59 MMHG

## 2020-01-03 LAB
ALBUMIN SERPL-MCNC: 2.5 GM/DL (ref 3.2–4.5)
ALP SERPL-CCNC: 70 U/L (ref 40–136)
ALT SERPL-CCNC: 12 U/L (ref 0–55)
BASOPHILS # BLD AUTO: 0 10^3/UL (ref 0–0.1)
BASOPHILS NFR BLD AUTO: 0 % (ref 0–10)
BILIRUB SERPL-MCNC: 0.2 MG/DL (ref 0.1–1)
BUN/CREAT SERPL: 61
CALCIUM SERPL-MCNC: 7.7 MG/DL (ref 8.5–10.1)
CHLORIDE SERPL-SCNC: 115 MMOL/L (ref 98–107)
CO2 SERPL-SCNC: 18 MMOL/L (ref 21–32)
CREAT SERPL-MCNC: 0.66 MG/DL (ref 0.6–1.3)
EOSINOPHIL # BLD AUTO: 0.1 10^3/UL (ref 0–0.3)
EOSINOPHIL NFR BLD AUTO: 3 % (ref 0–10)
ERYTHROCYTE [DISTWIDTH] IN BLOOD BY AUTOMATED COUNT: 16.4 % (ref 10–14.5)
GFR SERPLBLD BASED ON 1.73 SQ M-ARVRAT: > 60 ML/MIN
GLUCOSE SERPL-MCNC: 147 MG/DL (ref 70–105)
HCT VFR BLD CALC: 20 % (ref 35–52)
HGB BLD-MCNC: 5.8 G/DL (ref 11.5–16)
LYMPHOCYTES # BLD AUTO: 0.5 X 10^3 (ref 1–4)
LYMPHOCYTES NFR BLD AUTO: 18 % (ref 12–44)
MANUAL DIFFERENTIAL PERFORMED BLD QL: NO
MCH RBC QN AUTO: 26 PG (ref 25–34)
MCHC RBC AUTO-ENTMCNC: 30 G/DL (ref 32–36)
MCV RBC AUTO: 89 FL (ref 80–99)
MONOCYTES # BLD AUTO: 0.1 X 10^3 (ref 0–1)
MONOCYTES NFR BLD AUTO: 4 % (ref 0–12)
NEUTROPHILS # BLD AUTO: 1.9 X 10^3 (ref 1.8–7.8)
NEUTROPHILS NFR BLD AUTO: 75 % (ref 42–75)
PLATELET # BLD: 167 10^3/UL (ref 130–400)
PMV BLD AUTO: 11.3 FL (ref 7.4–10.4)
POTASSIUM SERPL-SCNC: 4.1 MMOL/L (ref 3.6–5)
PROT SERPL-MCNC: 5.1 GM/DL (ref 6.4–8.2)
SODIUM SERPL-SCNC: 142 MMOL/L (ref 135–145)
WBC # BLD AUTO: 2.6 10^3/UL (ref 4.3–11)

## 2020-01-03 RX ADMIN — IBUPROFEN PRN MG: 200 TABLET, FILM COATED ORAL at 11:31

## 2020-01-03 RX ADMIN — SODIUM CHLORIDE SCH MLS/HR: 900 INJECTION INTRAVENOUS at 08:13

## 2020-01-03 RX ADMIN — SODIUM CHLORIDE, SODIUM LACTATE, POTASSIUM CHLORIDE, AND CALCIUM CHLORIDE SCH MLS/HR: 600; 310; 30; 20 INJECTION, SOLUTION INTRAVENOUS at 02:37

## 2020-01-03 RX ADMIN — SODIUM CHLORIDE SCH MLS/HR: 900 INJECTION INTRAVENOUS at 02:37

## 2020-01-03 RX ADMIN — DOCUSATE SODIUM AND SENNOSIDES SCH EA: 8.6; 5 TABLET, FILM COATED ORAL at 08:13

## 2020-01-03 RX ADMIN — IPRATROPIUM BROMIDE AND ALBUTEROL SULFATE SCH ML: .5; 3 SOLUTION RESPIRATORY (INHALATION) at 02:31

## 2020-01-03 NOTE — DISCHARGE SUMMARY
RICH EL,MED STUDENT 1/3/20 1149:


Diagnosis/Chief Complaint


Date of Admission


Dec 31, 2019 at 20:28


Date of Discharge





Discharge Date:  Sammy 3, 2020


Admission Diagnosis


Assessment:


PNA


ESLD


Meth user


Anemia chronic


Jaundice





Plan:


Abx


Supportive care


Home [facility]


Primary Care


Center/Pending sale to [facility]


Discharge Diagnosis





(1) Pneumonia involving left lung


Status:  Acute


(2) Diabetes


Status:  Chronic


(3) NIDDM


(4) IV drug abuse


Status:  Acute





Discharge Summary


Discharge Physical Exam


Allergies:  


Coded Allergies:  


     hydrocodone (Verified  Allergy, Unknown, 1/10/19)


     hydrochlorothiazide (Verified  Adverse Reaction, Unknown, DIARRHEA, 

19)


Vitals & I&Os





Vital Signs








  Date Time  Temp Pulse Resp B/P (MAP) Pulse Ox O2 Delivery O2 Flow Rate FiO2


 


1/3/20 11:35 36.6 104 20 125/75 100 Room Air  


 


20 19:54       1.00 


 


20 03:02        28








General Appearance:  No Apparent Distress, WD/WN, Chronically ill


HEENT:  Pharynx Normal, Moist Mucous Membranes


Respiratory:  Chest Non Tender, Lungs Clear, Normal Breath Sounds, No Accessory 

Muscle Use, No Respiratory Distress


Cardiovascular:  Regular Rate, Rhythm, No Edema, No Gallop, No Murmur, Normal 

Peripheral Pulses


Gastrointestinal:  Non Tender, Soft, Distended (her norm ); No Guarding, No 

Hepatomegaly, No Rebound


Extremity:  No Calf Tenderness, No Pedal Edema


Skin:  Normal Color, Warm/Dry


Neurologic/Psychiatric:  Alert, Oriented x3





Hospital Course


Pt presented to ED with altered mental status, fever, cough and malaise. Family 

in room endorsed several weeks of illness. Chest x ray revealed hilar pneumonia 

and patient was admitted for fluid resuscitation, IV antibiotic therapy and 

monitoring. Patient returned to previous mentation after one day. On final day 

of hospital stay, patient developed anemia requiring blood transfusion and was 

discharged after completion with instructions to continue therapy with oral 

antibiotics.


Labs (last 24 hrs)


Laboratory Tests


1/3/20 05:35: 


White Blood Count 2.6L, Red Blood Count 2.19L, Hemoglobin 5.8*L, Hematocrit 20*L

, Mean Corpuscular Volume 89, Mean Corpuscular Hemoglobin 26, Mean Corpuscular 

Hemoglobin Concent 30L, Red Cell Distribution Width 16.4H, Platelet Count 167, 

Mean Platelet Volume 11.3H, Neutrophils (%) (Auto) 75, Lymphocytes (%) (Auto) 

18, Monocytes (%) (Auto) 4, Eosinophils (%) (Auto) 3, Basophils (%) (Auto) 0, 

Neutrophils # (Auto) 1.9, Lymphocytes # (Auto) 0.5L, Monocytes # (Auto) 0.1, 

Eosinophils # (Auto) 0.1, Basophils # (Auto) 0.0, Sodium Level 142, Potassium 

Level 4.1, Chloride Level 115H, Carbon Dioxide Level 18L, Anion Gap 9, Blood 

Urea Nitrogen 40H, Creatinine 0.66, Estimat Glomerular Filtration Rate > 60, 

BUN/Creatinine Ratio 61, Glucose Level 147H, Calcium Level 7.7L, Corrected 

Calcium 8.9, Total Bilirubin 0.2, Aspartate Amino Transf (AST/SGOT) 14, Alanine 

Aminotransferase (ALT/SGPT) 12, Alkaline Phosphatase 70, Total Protein 5.1L, 

Albumin 2.5L





Microbiology


19 Blood Culture - Preliminary, Resulted


           No growth


19 Influenza Types A,B Antigen (JESSICA) - Final, Complete


           


Patient resulted labs reviewed.


Pending Labs


Laboratory Tests


1/3/20 05:35: 


White Blood Count 2.6, Red Blood Count 2.19, Hemoglobin 5.8, Hematocrit 20, Mean

Corpuscular Volume 89, Mean Corpuscular Hemoglobin 26, Mean Corpuscular 

Hemoglobin Concent 30, Red Cell Distribution Width 16.4, Platelet Count 167, 

Mean Platelet Volume 11.3, Neutrophils (%) (Auto) 75, Lymphocytes (%) (Auto) 18,

Monocytes (%) (Auto) 4, Eosinophils (%) (Auto) 3, Basophils (%) (Auto) 0, 

Neutrophils # (Auto) 1.9, Lymphocytes # (Auto) 0.5, Monocytes # (Auto) 0.1, 

Eosinophils # (Auto) 0.1, Basophils # (Auto) 0.0, Sodium Level 142, Potassium 

Level 4.1, Chloride Level 115, Carbon Dioxide Level 18, Anion Gap 9, Blood Urea 

Nitrogen 40, Creatinine 0.66, Estimat Glomerular Filtration Rate > 60, B

UN/Creatinine Ratio 61, Glucose Level 147, Calcium Level 7.7, Corrected Calcium 

8.9, Total Bilirubin 0.2, Aspartate Amino Transf (AST/SGOT) 14, Alanine 

Aminotransferase (ALT/SGPT) 12, Alkaline Phosphatase 70, Total Protein 5.1, 

Albumin 2.5








Discharge


Home Medications:





Active Scripts


Active


Cefdinir 300 Mg Capsule 300 Mg PO BID


Reported


Methotrexate (Methotrexate Sodium) 2.5 Mg Tablet 10 Mg PO SA


     LAST FILLED #16 TABLETS 19


     TAKES 4 (2.5MG) TABLETS


Hydrochlorothiazide 25 Mg Tablet 25 Mg PO DAILY


     LAST FILLED #30 19


Folic Acid 1 Mg Tablet 1 Mg PO DAILY


     LAST FILLED #30 19


Adult Low Dose Aspirin EC (Aspirin) 81 Mg Tablet.dr 81 Mg PO DAILY


     LAST FILLED #30 11-15-19


Gabapentin 100 Mg Capsule 100 Mg PO BID


     LAST FILLED #60 10-07-19


Celecoxib 200 Mg Capsule 200 Mg PO DAILY


     LAST FILLED #30 19


Losartan Potassium 50 Mg Tablet 50 Mg PO DAILY


     LAST FILLED #30 19


Potassium Chloride 10 Meq Tab.er.prt 10 Meq PO DAILY


     LAST FILLED #30 19


Albuterol Sulfate 2.5 Mg/3 Ml Vial.neb 2.5 Mg NEB Q4H PRN





Instructions to patient/family


Please see electronic discharge instructions given to patient.





Clinical Quality Measures


DVT/VTE Risk/Contraindication:


Risk Factor Score Per Nursin


RFS Level Per Nursing on Admit:  4+=Very High





CHEN LUNA DO 1/3/20 1656:


Diagnosis/Chief Complaint


Discharge Diagnosis





(1) Pneumonia involving left lung


Status:  Acute


(2) Methamphetamine use


Status:  Acute





Discharge Summary


Discharge Physical Exam


Allergies:  


Coded Allergies:  


     hydrocodone (Verified  Allergy, Unknown, 1/10/19)


     hydrochlorothiazide (Verified  Adverse Reaction, Unknown, DIARRHEA, 

19)


General Appearance:  No Apparent Distress, WD/WN, Chronically ill


Respiratory:  Lungs Clear


Neurologic/Psychiatric:  Alert, Oriented x3





Hospital Course


Was the Problem List Reviewed?:  Yes


PNA in stage liver disease


Hospital course: Pt had a lengthy hospital course for 4 days for pneumonia with 

in stage liver disease provided supportive care with IV fluids nebulizer 

treatments antitussives and required 2 units of blood transfusions the day of 

discharge and pt is a very in stage status I recommend her a visit with hospice 

as [facility] had told her there is nothing more they can do for liver disease.





Discussion & Recommendations


Discharge Planning:  <30 minutes discharge planning





Supervisory-Addendum Brief


Verification & Attestation


Participated in pt care:  history, MDM, physical


Personally performed:  exam, history, MDM, supervision of care


Care discussed with:  Medical Student


Procedures:  n/a


Results interpretation:  Verified all documentation


Verification and Attestation of Medical Student E/M Service





A medical student performed and documented this service in my presence. I 

reviewed and verified all information documented by the medical student and made

modifications to such information, when appropriate. I personally performed the 

physical exam and medical decision making. 





 Chen Luna, Sammy 3, 2020,21:45





Problem Qualifiers





(1) Pneumonia involving left lung:  


Pneumonia type:  due to unspecified organism  Lung location:  unspecified part 

of lung  Qualified Codes:  J18.9 - Pneumonia, unspecified organism








RICH EL,MYRON PINEDA    Sammy 3, 2020 11:49


CHEN LUNA DO                 Sammy 3, 2020 17:34

## 2020-01-03 NOTE — NUR
SOLO LOPEZ admitted to room 431-1, with an admitting diagnosis of closed head 
injury on eliquis, on 12/31/19 from ED via stretcher, accompanied by staff. SOLO LOPEZ introduced to surroundings, call light, bed controls, phone, TV, temperature 
control, lights, meal times, smoking policy, visitor policy, side rail policy, bathrooms and 
showers.  Patient Rights given to patient in the handbook. SOLO LOPEZ verbalizes 
understanding that [facility] is not responsible for the loss or damage to any personal 
effects or valuables that are kept in the patients possession during their hospitalization.  
The following Patient Care Plans were discussed with the patient: Discharge Planning, pain 
management, medications, and dehydration. SOLO LOPEZ verbalizes understanding of 
Interdisciplinary Patient Education. Patient and/or family were informed about the Rapid 
Response Team and its purpose.

## 2020-01-03 NOTE — NUR
CM/SS visited with patient to follow up prior to discharge. 



Plan: The patient will discharge to home with significant other. 



The patient states that her significant other that is in the room at visit will be able to 
transport the patient home. The patient did have questions about resources available in the 
area and specific questions about food stamps. CM/SS directed the patient to call the [facility] 
office in [facility] due to patient being discharged today to get a Medicaid application to see if 
she qualifies for assistance. The patient verbalized understanding. The patient also reports 
that she is going to contact her employer to see about collecting her short term disability 
insurance for the time she was unable to work. She states that she had no other concerns at 
this time.

## 2020-01-03 NOTE — NUR
RD ASSESSMENT 



PMHx: HTN; hepatitis; cirrhosis; DM; RA; ESLD; hx of IV meth use



PT INTERACTION: Pt was semi-awake and pleasant during nutrition assessment. Pt states 
current appetite is poor and has been for the past couple of days. Note ave PO intake of 45% 
x2d, per chart review. Pt states following a regular diet at home, and has no issues with 
chewing/swallowing food. Pt states no recent issues with n/v/c at this time, and had recent 
issues with diarrhea. Note last BM on 1/2 and pt currently on bowel regimen of senna BID, 
per chart review. Pt states recent 22# wt loss x4mon. Note 8# wt loss x6w, per chart review. 




ABNORMAL NUTRITION-RELATED LAB VALUES

LOW:  Ca 7.7; Pro 5.1; alb 2.5

HIGH: Cl 115; BUN 40; glu 140



Est. kcal needs: 0029-7016 kcal | 20-25 kcal/kg 

Est. Pro needs:  54-67 g Pro | 0.8-1.0 g Pro/kg



PES STATEMENT: Inadequate oral intake (NI-2.1) related to loss of appetite | diarrhea as 
evidenced by pt interview | avg PO intake 45% x2d



INTERVENTION:  

Continue with current diet order of Regular diet. 

Pt may need to be placed on consistent CHO diet, if blood glucose levels remain elevated. 

Add Glucerna (vanilla) to trays TID for increased kcal intake. Provides 220 kcal and 10 g 
Pro per serving. 

Will continue to follow and reassess as pt needs and status change. 



MONITOR/EVALUATE:  

PO Intake; Plan of Care; Hydration Status; Weight Status; Lab Values 





PAM Kat, MS, RD, LD

## 2020-01-03 NOTE — PHYSICIAN QUERY CLARIFICATION
PQ-Uncertain Diagnosis


Admission/Discharge


Admission Date: Dec 31, 2019 at 20:28 


Discharge Date:





The medical record reflects the following clinical scenario:





History/Risk Factors:


Pneumonia, Cirrhosis liver, chronic hepatitis C





Clinical Findings:


T38.1, P121, R16, Bp 145/79, WBC 7.7>2.8, Lactic acid 1.41





Treatment:


Septic order set





Question:





Is Sepsis a clinically valid diagnosis?


Sepsis was documented in the the ER record with no further documentation in the 

medical record. 





Please document a response in Progress Note or Discharge Summary.





   1.  Yes, clinically valid, condition resolved.





   2.  No, condition ruled out.





   3.  Other, with explanation of clinical findings.





   4.  Undetermined, no explanation for clinical findings.





PHYSICIAN RESPONSE


Diagnosis clinically valid:  Yes, Conditon resolved


Please remember a lack of response to the above will prompt a phone page by 

CDI/Coding staff. 





In responding to this query, please exercise your independent professional 

judgment.  The purpose of this communication is to more accurately reflect the 

complexity of your patients condition. The fact that a question is asked does 

not imply that any particular answer is desired or expected.  





Thank you for your timely response to this clarification.      


   


Requestors name: Dax   





Phone # [phone]








THIS PHYSICIAN QUERY FORM IS A PERMANENT PART OF THE MEDICAL RECORD











DAX LAYTON                  Sammy 3, 2020 09:36


SKY JONES DO                 Sammy 3, 2020 11:20

## 2020-01-03 NOTE — PHYSICAL THERAPY PROGRESS NOTE
Therapy Progress Note


Patient receiving PRBC and adamantly declined PT.  PT will attempt later today.


1 ref











VANITA HENDRIX PT               Sammy 3, 2020 11:04

## 2020-01-05 ENCOUNTER — HOSPITAL ENCOUNTER (EMERGENCY)
Dept: HOSPITAL 75 - ER FS | Age: 60
Discharge: LEFT BEFORE DISCHARGE | End: 2020-01-05
Payer: COMMERCIAL

## 2020-01-05 VITALS — SYSTOLIC BLOOD PRESSURE: 109 MMHG | DIASTOLIC BLOOD PRESSURE: 57 MMHG

## 2020-01-05 VITALS — BODY MASS INDEX: 25.51 KG/M2 | HEIGHT: 62.99 IN | WEIGHT: 143.96 LBS

## 2020-01-05 DIAGNOSIS — Z88.5: ICD-10-CM

## 2020-01-05 DIAGNOSIS — I10: ICD-10-CM

## 2020-01-05 DIAGNOSIS — Z87.01: ICD-10-CM

## 2020-01-05 DIAGNOSIS — Z79.82: ICD-10-CM

## 2020-01-05 DIAGNOSIS — Z87.442: ICD-10-CM

## 2020-01-05 DIAGNOSIS — Z77.22: ICD-10-CM

## 2020-01-05 DIAGNOSIS — R05: Primary | ICD-10-CM

## 2020-01-05 DIAGNOSIS — Z98.51: ICD-10-CM

## 2020-01-05 DIAGNOSIS — Z88.8: ICD-10-CM

## 2020-01-05 DIAGNOSIS — B19.20: ICD-10-CM

## 2020-01-05 DIAGNOSIS — M06.9: ICD-10-CM

## 2020-01-05 DIAGNOSIS — Z82.49: ICD-10-CM

## 2020-01-05 DIAGNOSIS — F41.9: ICD-10-CM

## 2020-01-05 DIAGNOSIS — E11.40: ICD-10-CM

## 2020-01-05 PROCEDURE — 71046 X-RAY EXAM CHEST 2 VIEWS: CPT

## 2020-01-05 NOTE — NUR
Lab to desk to report unable to draw as sites limited and no blood flash from 
any sticks attempted. Dr Gonsales being notified. Pt told lab no lower extremity 
lab draws.

## 2020-01-05 NOTE — DIAGNOSTIC IMAGING REPORT
INDICATION: Cough and weakness



COMPARISON: 12/31/2019 and 11/18/2019



TECHNIQUE: Frontal and lateral radiograph of the chest are

obtained dated 01/05/2020.



FINDINGS: The cardiac silhouette is mildly enlarged, though

similar to the prior examination. Minimal central pulmonary

vascular congestion, appearing slightly less prominent than the

prior examination. Previously noted opacities within the left

perihilar region extending into the left lung base are again

noted, though slightly improved since the prior exam. Linear

opacities within the left lung base have slightly increased. The

right lung appears clear. No significant pleural effusion. No

pneumothorax. Scattered osseous degenerative changes without

acute osseous abnormality.



IMPRESSION: Improving though persistent left perihilar and left

basilar infiltrates with focal atelectasis within the left mid

lung. Recommend continued radiographic follow-up to ensure

resolution as underlying obstructing mass lesion is not excluded.



Stable mild cardiomegaly with improved minimal pulmonary vascular

congestion.



Dictated by: 



  Dictated on workstation # [id number]

## 2020-01-05 NOTE — NUR
2nd RN has been attempting IV start unsuccessful at getting into veins. Pt has 
refused the EJ site.

## 2020-01-05 NOTE — NUR
Attempting IV start, pt report horrible stick history and encountered an IV in 
side of her neck in [place]. Pt refuses that site.

## 2020-01-05 NOTE — NUR
Noted patient opened door to her room and walked away towards the exit door. 
This RN followed patient and requested to stop to talk about departure decision 
AMA. Pt agreeable to sign the forms. Pt states, "My boyfriend made me come and 
I think I shouldn't of." "I am interfering with my sister's visit with he son 
home from the [place] serving in ." Female  is with 
patient. Pt denies new discomfort or problem. Pt is sore in left side rib cage 
from coughing. Pt states she had stopped antibiotic for getting diarrhea but 
was educated the typical side effects can occur of upset stomach and loose 
stools. Pt told to keep all follow up arrangements post discharge and return to 
ED for new or worsening concerns/problems. Dr Does hearing this conversation 
from a room.

## 2020-07-12 ENCOUNTER — HOSPITAL ENCOUNTER (EMERGENCY)
Dept: HOSPITAL 75 - ER | Age: 60
Discharge: HOME | End: 2020-07-12
Payer: COMMERCIAL

## 2020-07-12 VITALS — DIASTOLIC BLOOD PRESSURE: 116 MMHG | SYSTOLIC BLOOD PRESSURE: 183 MMHG

## 2020-07-12 VITALS — BODY MASS INDEX: 23.01 KG/M2 | WEIGHT: 129.85 LBS | HEIGHT: 62.99 IN

## 2020-07-12 DIAGNOSIS — Z88.5: ICD-10-CM

## 2020-07-12 DIAGNOSIS — K80.80: ICD-10-CM

## 2020-07-12 DIAGNOSIS — F17.210: ICD-10-CM

## 2020-07-12 DIAGNOSIS — R18.8: ICD-10-CM

## 2020-07-12 DIAGNOSIS — Z82.49: ICD-10-CM

## 2020-07-12 DIAGNOSIS — I10: ICD-10-CM

## 2020-07-12 DIAGNOSIS — Z79.82: ICD-10-CM

## 2020-07-12 DIAGNOSIS — B18.2: Primary | ICD-10-CM

## 2020-07-12 DIAGNOSIS — E11.40: ICD-10-CM

## 2020-07-12 DIAGNOSIS — R19.7: ICD-10-CM

## 2020-07-12 DIAGNOSIS — Z79.1: ICD-10-CM

## 2020-07-12 DIAGNOSIS — Z88.8: ICD-10-CM

## 2020-07-12 DIAGNOSIS — M06.9: ICD-10-CM

## 2020-07-12 LAB
ALBUMIN SERPL-MCNC: 3.1 GM/DL (ref 3.2–4.5)
ALP SERPL-CCNC: 170 U/L (ref 40–136)
ALT SERPL-CCNC: 16 U/L (ref 0–55)
BASOPHILS # BLD AUTO: 0 10^3/UL (ref 0–0.1)
BASOPHILS NFR BLD AUTO: 0 % (ref 0–10)
BASOPHILS NFR BLD MANUAL: 0 %
BILIRUB SERPL-MCNC: 0.5 MG/DL (ref 0.1–1)
BUN/CREAT SERPL: 40
CALCIUM SERPL-MCNC: 8.4 MG/DL (ref 8.5–10.1)
CHLORIDE SERPL-SCNC: 102 MMOL/L (ref 98–107)
CO2 SERPL-SCNC: 15 MMOL/L (ref 21–32)
CREAT SERPL-MCNC: 0.82 MG/DL (ref 0.6–1.3)
EOSINOPHIL # BLD AUTO: 0.1 10^3/UL (ref 0–0.3)
EOSINOPHIL NFR BLD AUTO: 1 % (ref 0–10)
EOSINOPHIL NFR BLD MANUAL: 0 %
ERYTHROCYTE [DISTWIDTH] IN BLOOD BY AUTOMATED COUNT: 15.3 % (ref 10–14.5)
GFR SERPLBLD BASED ON 1.73 SQ M-ARVRAT: > 60 ML/MIN
GLUCOSE SERPL-MCNC: 297 MG/DL (ref 70–105)
HCT VFR BLD CALC: 39 % (ref 35–52)
HGB BLD-MCNC: 12.5 G/DL (ref 11.5–16)
LIPASE SERPL-CCNC: 22 U/L (ref 8–78)
LYMPHOCYTES # BLD AUTO: 0.5 X 10^3 (ref 1–4)
LYMPHOCYTES NFR BLD AUTO: 5 % (ref 12–44)
MANUAL DIFFERENTIAL PERFORMED BLD QL: YES
MCH RBC QN AUTO: 25 PG (ref 25–34)
MCHC RBC AUTO-ENTMCNC: 32 G/DL (ref 32–36)
MCV RBC AUTO: 79 FL (ref 80–99)
MONOCYTES # BLD AUTO: 0.4 X 10^3 (ref 0–1)
MONOCYTES NFR BLD AUTO: 5 % (ref 0–12)
MONOCYTES NFR BLD: 1 %
NEUTROPHILS # BLD AUTO: 7.9 X 10^3 (ref 1.8–7.8)
NEUTROPHILS NFR BLD AUTO: 89 % (ref 42–75)
NEUTS BAND NFR BLD MANUAL: 94 %
NEUTS BAND NFR BLD: 0 %
PLATELET # BLD: 331 10^3/UL (ref 130–400)
PMV BLD AUTO: 11.5 FL (ref 7.4–10.4)
POTASSIUM SERPL-SCNC: 3.5 MMOL/L (ref 3.6–5)
PROT SERPL-MCNC: 6.3 GM/DL (ref 6.4–8.2)
RBC MORPH BLD: NORMAL
SODIUM SERPL-SCNC: 131 MMOL/L (ref 135–145)
VARIANT LYMPHS NFR BLD MANUAL: 5 %
WBC # BLD AUTO: 8.9 10^3/UL (ref 4.3–11)

## 2020-07-12 PROCEDURE — 83690 ASSAY OF LIPASE: CPT

## 2020-07-12 PROCEDURE — 80053 COMPREHEN METABOLIC PANEL: CPT

## 2020-07-12 PROCEDURE — 85007 BL SMEAR W/DIFF WBC COUNT: CPT

## 2020-07-12 PROCEDURE — 74176 CT ABD & PELVIS W/O CONTRAST: CPT

## 2020-07-12 PROCEDURE — 36415 COLL VENOUS BLD VENIPUNCTURE: CPT

## 2020-07-12 PROCEDURE — 85027 COMPLETE CBC AUTOMATED: CPT

## 2020-07-12 RX ADMIN — SODIUM CHLORIDE, SODIUM LACTATE, POTASSIUM CHLORIDE, AND CALCIUM CHLORIDE SCH MLS/HR: 600; 310; 30; 20 INJECTION, SOLUTION INTRAVENOUS at 13:33

## 2020-07-12 RX ADMIN — SODIUM CHLORIDE, SODIUM LACTATE, POTASSIUM CHLORIDE, AND CALCIUM CHLORIDE SCH MLS/HR: 600; 310; 30; 20 INJECTION, SOLUTION INTRAVENOUS at 15:03

## 2020-07-12 NOTE — DIAGNOSTIC IMAGING REPORT
PROCEDURE: CT abdomen and pelvis without contrast.



TECHNIQUE: Multiple contiguous axial images were obtained through

the abdomen and pelvis without the use of intravenous contrast.

Auto Exposure Controls were utilized during the CT exam to meet

ALARA standards for radiation dose reduction. 



Indication: Abdominal pain with nausea vomiting cramping for one

week.



Comparison: None.



Discussion: Atelectasis noted within the lung bases. Normal heart

size. No pleural or pericardial fluid. Cholelithiasis is noted.

There is mild perihepatic ascites. Trace ascites is also noted

within the pelvis. Gallbladder wall is thickened which could be

due to acute cholecystitis or reactive changes from the ascites.

The spleen is enlarged measuring 13.3 cm. Inflammatory changes

noted along the duodenum which is indeterminate. Multiple

thick-walled small bowel loops are noted diffusely throughout the

abdomen, likely infectious or inflammatory. The terminal ileum is

involved. The appendix is normal. Liquid stool is noted within

the colon. Uterus and urinary bladder are unremarkable. No acute

osseous abnormality. Advanced degenerative disease noted within

the lumbar spine.



Impression:

1. Mild ascites.

2. Cholelithiasis with thick-walled gallbladder which could be

due to adjacent ascites or cholecystitis.

3. Diffusely thick-walled small bowel noted entirely throughout

the abdomen which is likely due to an infectious or inflammatory

process. No pneumatosis or pneumoperitoneum 

4. Mild splenomegaly. 



Dictated by: 



  Dictated on workstation # [id number]

## 2020-07-12 NOTE — ED GI
General


Chief Complaint:  Abdominal/GI Problems


Stated Complaint:  VOMITING / DIARRHEA


Nursing Triage Note:  


pt amb to rm 6 with complaint of abd pain, n/v and cramping x1 week. states 


feels dehydrated.


Sepsis Screen:  No Definite Risk


Source of Information:  Patient


Exam Limitations:  No Limitations





History of Present Illness


Date Seen by Provider:  2020


Time Seen by Provider:  13:35


Initial Comments


To ER with epigastric abdominal cramping, nausea vomiting diarrhea for one week 

without blood or mucus in the diarrhea.


Timing/Duration:  1 Week


Severity/Quality:  Cramping


Location:  Epigastric


Radiation:  No Radiation


Activities at Onset:  None


Associated Symptoms:  Nausea/Vomiting





Allergies and Home Medications


Allergies


Coded Allergies:  


     hydrocodone (Verified  Allergy, Unknown, 1/10/19)


     hydrochlorothiazide (Verified  Adverse Reaction, Unknown, DIARRHEA, 

19)





Home Medications


Albuterol Sulfate 2.5 Mg/3 Ml Vial.neb, 2.5 MG NEB Q4H PRN for SHORTNESS OF B

REATH, (Reported)


Aspirin 81 Mg Tablet.dr, 81 MG PO DAILY, (Reported)


   LAST FILLED #30 11-15-19 


Cefdinir 300 Mg Capsule, 300 MG PO BID


   Prescribed by: SKY JONES on 1/3/20 1112


Celecoxib 200 Mg Capsule, 200 MG PO DAILY, (Reported)


   LAST FILLED #30 19 


Folic Acid 1 Mg Tablet, 1 MG PO DAILY, (Reported)


   LAST FILLED #30 19 


Gabapentin 100 Mg Capsule, 100 MG PO BID, (Reported)


   LAST FILLED #60 10-07-19 


Hydrochlorothiazide 25 Mg Tablet, 25 MG PO DAILY, (Reported)


   LAST FILLED #30 19 


Losartan Potassium 50 Mg Tablet, 50 MG PO DAILY, (Reported)


   LAST FILLED #30 19 


Potassium Chloride 10 Meq Tab.er.prt, 10 MEQ PO DAILY, (Reported)


   LAST FILLED #30 19 





Patient Home Medication List


Home Medication List Reviewed:  Yes





Review of Systems


Review of Systems


Constitutional:  see HPI


EENTM:  No Symptoms Reported


Respiratory:  No Symptoms Reported


Cardiovascular:  No Symptoms Reported


Gastrointestinal:  See HPI, Abdominal Pain, Diarrhea, Nausea, Vomiting


Genitourinary:  No Symptoms Reported


Musculoskeletal:  no symptoms reported


Skin:  no symptoms reported


Psychiatric/Neurological:  No Symptoms Reported


Endocrine:  No Symptoms Reported


Hematologic/Lymphatic:  No Symptoms Reported





Past Medical-Social-Family Hx


Patient Social History


Alcohol Use:  Denies Use


Recreational Drug Use:  Yes (marijuana)


Drug of Choice:  + IV METH USE


Smoking Status:  Current Everyday Smoker


Type Used:  Cigarettes


2nd Hand Smoke Exposure:  Yes


Recent Foreign Travel:  No


Contact w/Someone Who Travel:  No


Recent Infectious Disease Expo:  No


Recent Hopitalizations:  No





Immunizations Up To Date


Tetanus Booster (TDap):  More than 5yrs


Date of Influenza Vaccine:  2019





Seasonal Allergies


Seasonal Allergies:  No





Past Medical History


Surgeries:  Yes (LITHOTRIPSY; FACIAL RECONSTRUCTION;  X 1-TWINS)


 Section, Renal, Tubal Ligation


Respiratory:  No


Cardiac:  Yes (VASCULITIS OF LEGS)


Chronic Edema/Swelling, Hypertension, Peripheral Vascular


Neurological:  Yes


Neuropathy


GYN History:  Menopausal


Genitourinary:  Yes


Kidney Stones


Gastrointestinal:  Yes (HEPATITIS C--NO TREATMENT)


Hepatitis, Cirrhosis


Musculoskeletal:  Yes


Arthritis, Rheumatoid Arthritis


Endocrine:  Yes


Diabetes, Non-Insulin dep


HEENT:  No


Cancer:  No


Psychosocial:  Yes


Anxiety


Integumentary:  Yes (CHRONIC LEG EDEMA AND CELLULITIS ; VASCULITIS)


Blood Disorders:  No





Family Medical History


Heart Disease, Cancer, Diabetes





Physical Exam


Vital Signs





Vital Signs - First Documented








 20





 13:07


 


Temp 36.4


 


Pulse 99


 


Resp 20


 


B/P (MAP) 183/116 (138)


 


Pulse Ox 100


 


O2 Delivery Room Air





Capillary Refill : Less Than 3 Seconds


Height/Weight/BMI


Height: 5'3.00"


Weight: 159lbs. 0oz. [id number]; 23.00 BMI


Method:Stated


General Appearance:  WD/WN, no apparent distress, thin


HEENT:  PERRL/EOMI, normal ENT inspection


Respiratory:  lungs clear, normal breath sounds, no respiratory distress, no 

accessory muscle use


Cardiovascular:  regular rate, rhythm, no murmur


Gastrointestinal:  normal bowel sounds, non tender, soft


Extremities:  normal range of motion, non-tender


Neurologic/Psychiatric:  alert, normal mood/affect, oriented x 3


Skin:  normal color, warm/dry





Progress/Results/Core Measures


Results/Orders


Lab Results





Laboratory Tests








Test


 20


13:28 Range/Units


 


 


White Blood Count


 8.9 


 4.3-11.0


10^3/uL


 


Red Blood Count


 4.92 


 4.35-5.85


10^6/uL


 


Hemoglobin 12.5  11.5-16.0  G/DL


 


Hematocrit 39  35-52  %


 


Mean Corpuscular Volume 79 L 80-99  FL


 


Mean Corpuscular Hemoglobin 25  25-34  PG


 


Mean Corpuscular Hemoglobin


Concent 32 


 32-36  G/DL





 


Red Cell Distribution Width 15.3 H 10.0-14.5  %


 


Platelet Count


 331 


 130-400


10^3/uL


 


Mean Platelet Volume 11.5 H 7.4-10.4  FL


 


Neutrophils (%) (Auto) 89 H 42-75  %


 


Lymphocytes (%) (Auto) 5 L 12-44  %


 


Monocytes (%) (Auto) 5  0-12  %


 


Eosinophils (%) (Auto) 1  0-10  %


 


Basophils (%) (Auto) 0  0-10  %


 


Neutrophils # (Auto) 7.9 H 1.8-7.8  X 10^3


 


Lymphocytes # (Auto) 0.5 L 1.0-4.0  X 10^3


 


Monocytes # (Auto) 0.4  0.0-1.0  X 10^3


 


Eosinophils # (Auto)


 0.1 


 0.0-0.3


10^3/uL


 


Basophils # (Auto)


 0.0 


 0.0-0.1


10^3/uL


 


Neutrophils % (Manual) 94   %


 


Lymphocytes % (Manual) 5   %


 


Monocytes % (Manual) 1   %


 


Eosinophils % (Manual) 0   %


 


Basophils % (Manual) 0   %


 


Band Neutrophils 0   %


 


Blood Morphology Comment NORMAL   


 


Sodium Level 131 L 135-145  MMOL/L


 


Potassium Level 3.5 L 3.6-5.0  MMOL/L


 


Chloride Level 102    MMOL/L


 


Carbon Dioxide Level 15 L 21-32  MMOL/L


 


Anion Gap 14  5-14  MMOL/L


 


Blood Urea Nitrogen 33 H 7-18  MG/DL


 


Creatinine


 0.82 


 0.60-1.30


MG/DL


 


Estimat Glomerular Filtration


Rate > 60 


  





 


BUN/Creatinine Ratio 40   


 


Glucose Level 297 H   MG/DL


 


Calcium Level 8.4 L 8.5-10.1  MG/DL


 


Corrected Calcium 9.1  8.5-10.1  MG/DL


 


Total Bilirubin 0.5  0.1-1.0  MG/DL


 


Aspartate Amino Transf


(AST/SGOT) 16 


 5-34  U/L





 


Alanine Aminotransferase


(ALT/SGPT) 16 


 0-55  U/L





 


Alkaline Phosphatase 170 H   U/L


 


Total Protein 6.3 L 6.4-8.2  GM/DL


 


Albumin 3.1 L 3.2-4.5  GM/DL


 


Lipase 22  8-78  U/L








My Orders





Orders - AZEB DONALDSON APRN


Cbc With Automated Diff (20 12:55)


Comprehensive Metabolic Panel (20 12:55)


Lipase (20 12:55)


Ua Culture If Indicated (20 12:55)


Ed Iv/Invasive Line Start (20 12:55)


Lactated Ringers (Lr 1000 Ml Iv Solution (20 13:00)


Ondansetron Injection (Zofran Injectio (20 13:00)


Manual Differential (20 13:28)


Lactated Ringers (Lr 1000 Ml Iv Solution (20 14:15)


Ct Abdomen/Pelvis Wo (20 14:11)





Medications Given in ED





Current Medications








 Medications  Dose


 Ordered  Sig/Irving


 Route  Start Time


 Stop Time Status Last Admin


Dose Admin


 


 Ondansetron HCl  8 mg  ONCE  ONCE


 IVP  20 13:00


 20 13:01 DC 20 13:33


8 MG








Vital Signs/I&O











 20





 13:07


 


Temp 36.4


 


Pulse 99


 


Resp 20


 


B/P (MAP) 183/116 (138)


 


Pulse Ox 100


 


O2 Delivery Room Air














Blood Pressure Mean:                    138











Diagnostic Imaging





   Diagonstic Imaging:  CT


   Plain Films/CT/US/NM/MRI:  other


Comments


NAME:   SOLO LOPEZ


[facility] REC#:   [id number]


ACCOUNT#:   [id number]


PT STATUS:   REG ER


:   1960


PHYSICIAN:   AZEB DONALDSON


ADMIT DATE:   20/ER


                                   ***Draft***


Date of Exam:20





CT ABDOMEN/PELVIS WO








PROCEDURE: CT abdomen and pelvis without contrast.





TECHNIQUE: Multiple contiguous axial images were obtained through


the abdomen and pelvis without the use of intravenous contrast.


Auto Exposure Controls were utilized during the CT exam to meet


ALARA standards for radiation dose reduction. 





Indication: Abdominal pain with nausea vomiting cramping for one


week.





Comparison: None.





Discussion: Atelectasis noted within the lung bases. Normal heart


size. No pleural or pericardial fluid. Cholelithiasis is noted.


There is mild perihepatic ascites. Trace ascites is also noted


within the pelvis. Gallbladder wall is thickened which could be


due to acute cholecystitis or reactive changes from the ascites.


The spleen is enlarged measuring 13.3 cm. Inflammatory changes


noted along the duodenum which is indeterminate. Multiple


thick-walled small bowel loops are noted diffusely throughout the


abdomen, likely infectious or inflammatory. The terminal ileum is


involved. The appendix is normal. Liquid stool is noted within


the colon. Uterus and urinary bladder are unremarkable. No acute


osseous abnormality. Advanced degenerative disease noted within


the lumbar spine.





Impression:


1. Mild ascites.


2. Cholelithiasis with thick-walled gallbladder which could be


due to adjacent ascites or cholecystitis.


3. Diffusely thick-walled small bowel noted entirely throughout


the abdomen which is likely due to an infectious or inflammatory


process. No pneumatosis or pneumoperitoneum 


4. Mild splenomegaly. 





  Dictated on workstation # [id number]








Dict:   20 1436


Trans:   20 1444


[facility] ([facility] [phone]





Interpreted by:     АННА FINK MD


Electronically signed by:





Departure


Communication (Admissions)


Have cholelithiasis seen on CT as well as some ascites. She has known hepatitis 

C. The gallbladder is a thick-walled appearance which could be related to 

ascites or cholecystitis. However this is almost certainly related to the 

ascites as she has absolutely no tenderness to even deep palpation in the right 

upper quadrant. Given the thick-walled appearance of the small bowel loops co

nsistent with an infectious process and her one-week history of persistent 

nausea diarrhea I will give her Cipro plus Flagyl and have her follow-up with 

surgery.





Impression





   Primary Impression:  


   Nausea vomiting and diarrhea


   Additional Impressions:  


   Chronic hepatitis C


   mild ascites


   Asymptomatic cholelithiasis


Disposition:  01 HOME, SELF-CARE


Condition:  Stable





Departure-Patient Inst.


Decision time for Depature:  14:57


Referrals:  


[facility] (PCP/Family)


Primary Care Physician








JUAN NOVAK BRETT D DO KIDO, TAKAAKI MD


Patient Instructions:  No Instuctions Given





Add. Discharge Instructions:  


This nausea vomiting and diarrhea could be from a bacterial or a viral cause. 

Take the antibiotics as directed. Return to ER for any worsening symptoms bloody

stools fevers or intolerable pain. Call one of the surgeons listed for further 

evaluation of the gallstone. 





All discharge instructions reviewed with patient and/or family. Voiced 

understanding.


Scripts


Ondansetron (Ondansetron Odt) 4 Mg Tab.rapdis


4 MG PO Q6H PRN for NAUSEA/VOMITING, #8 TAB 0 Refills


   Prov: AZEB DONALDSON         20 


Metronidazole (Flagyl) 500 Mg Tablet


500 MG PO TID, #21 TAB


   Prov: AZEB DONALDSON         20 


Ciprofloxacin HCl (Ciprofloxacin HCl) 500 Mg Tablet


500 MG PO BID, #14 TAB


   Prov: AZEB DONALDSON         20











AZEB DONALDSON             2020 13:37

## 2020-12-01 ENCOUNTER — HOSPITAL ENCOUNTER (OUTPATIENT)
Dept: HOSPITAL 75 - RAD FS | Age: 60
End: 2020-12-01
Attending: INTERNAL MEDICINE
Payer: MEDICAID

## 2020-12-01 DIAGNOSIS — I85.10: ICD-10-CM

## 2020-12-01 DIAGNOSIS — R16.1: ICD-10-CM

## 2020-12-01 DIAGNOSIS — J44.9: Primary | ICD-10-CM

## 2020-12-01 DIAGNOSIS — B19.20: ICD-10-CM

## 2020-12-01 DIAGNOSIS — R18.8: ICD-10-CM

## 2020-12-01 LAB
ALBUMIN SERPL-MCNC: 3 GM/DL (ref 3.2–4.5)
ALP SERPL-CCNC: 225 U/L (ref 40–136)
ALT SERPL-CCNC: 32 U/L (ref 0–55)
AMMONIA PLAS-SCNC: 52 UMOL/L (ref 11–32)
BASOPHILS # BLD AUTO: 0 10^3/UL (ref 0–0.1)
BASOPHILS NFR BLD AUTO: 0 % (ref 0–10)
BILIRUB SERPL-MCNC: 0.4 MG/DL (ref 0.1–1)
BUN/CREAT SERPL: 24
CALCIUM SERPL-MCNC: 8.8 MG/DL (ref 8.5–10.1)
CHLORIDE SERPL-SCNC: 107 MMOL/L (ref 98–107)
CO2 SERPL-SCNC: 23 MMOL/L (ref 21–32)
CREAT SERPL-MCNC: 0.71 MG/DL (ref 0.6–1.3)
EOSINOPHIL # BLD AUTO: 0 10^3/UL (ref 0–0.3)
EOSINOPHIL NFR BLD AUTO: 1 % (ref 0–10)
GFR SERPLBLD BASED ON 1.73 SQ M-ARVRAT: > 60 ML/MIN
GLUCOSE SERPL-MCNC: 191 MG/DL (ref 70–105)
HCT VFR BLD CALC: 30 % (ref 35–52)
HGB BLD-MCNC: 8.7 G/DL (ref 11.5–16)
INR PPP: 1.1 (ref 0.8–1.4)
LYMPHOCYTES # BLD AUTO: 0.4 X 10^3 (ref 1–4)
LYMPHOCYTES NFR BLD AUTO: 12 % (ref 12–44)
MANUAL DIFFERENTIAL PERFORMED BLD QL: NO
MCH RBC QN AUTO: 24 PG (ref 25–34)
MCHC RBC AUTO-ENTMCNC: 29 G/DL (ref 32–36)
MCV RBC AUTO: 83 FL (ref 80–99)
MONOCYTES # BLD AUTO: 0.2 X 10^3 (ref 0–1)
MONOCYTES NFR BLD AUTO: 5 % (ref 0–12)
NEUTROPHILS # BLD AUTO: 2.8 X 10^3 (ref 1.8–7.8)
NEUTROPHILS NFR BLD AUTO: 81 % (ref 42–75)
PLATELET # BLD: 130 10^3/UL (ref 130–400)
PMV BLD AUTO: 9.9 FL (ref 7.4–10.4)
POTASSIUM SERPL-SCNC: 4.8 MMOL/L (ref 3.6–5)
PROT SERPL-MCNC: 6.9 GM/DL (ref 6.4–8.2)
PROTHROMBIN TIME: 14.7 SEC (ref 12.2–14.7)
SODIUM SERPL-SCNC: 137 MMOL/L (ref 135–145)
WBC # BLD AUTO: 3.5 10^3/UL (ref 4.3–11)

## 2020-12-01 PROCEDURE — 85610 PROTHROMBIN TIME: CPT

## 2020-12-01 PROCEDURE — 86703 HIV-1/HIV-2 1 RESULT ANTBDY: CPT

## 2020-12-01 PROCEDURE — 82105 ALPHA-FETOPROTEIN SERUM: CPT

## 2020-12-01 PROCEDURE — 80074 ACUTE HEPATITIS PANEL: CPT

## 2020-12-01 PROCEDURE — 82140 ASSAY OF AMMONIA: CPT

## 2020-12-01 PROCEDURE — 82746 ASSAY OF FOLIC ACID SERUM: CPT

## 2020-12-01 PROCEDURE — 83540 ASSAY OF IRON: CPT

## 2020-12-01 PROCEDURE — 87522 HEPATITIS C REVRS TRNSCRPJ: CPT

## 2020-12-01 PROCEDURE — 71250 CT THORAX DX C-: CPT

## 2020-12-01 PROCEDURE — 85025 COMPLETE CBC W/AUTO DIFF WBC: CPT

## 2020-12-01 PROCEDURE — 82607 VITAMIN B-12: CPT

## 2020-12-01 PROCEDURE — 36415 COLL VENOUS BLD VENIPUNCTURE: CPT

## 2020-12-01 PROCEDURE — 80053 COMPREHEN METABOLIC PANEL: CPT

## 2020-12-01 PROCEDURE — 82728 ASSAY OF FERRITIN: CPT

## 2020-12-01 NOTE — DIAGNOSTIC IMAGING REPORT
EXAMINATION: CT Chest without contrast.



TECHNIQUE: Multiple contiguous axial images were obtained through

the chest without the use of intravenous contrast. All CT scans

use one or more of the following dose optimizing techniques:

automated exposure control, MA and/or KvP adjustment based on a

patient size and exam type, or iterative reconstruction. 



HISTORY: COPD



COMPARISON: None available.



FINDINGS: 



Axial source images are not available on PACS, these were able to

be viewed on the 3-D viewer.



There is no edema or pneumonia. No pleural effusion. No

pneumothorax. No suspicious nodules. There is left base

atelectasis or scarring.



There is no axillary or supraclavicular lymphadenopathy. There is

no mediastinal lymphadenopathy. Heart size is normal. There are

mild coronary artery calcifications.  No pericardial effusion.

Aorta is normal in caliber.



Limited views of the upper abdomen show ascites and splenomegaly.

Liver surface is nodular consistent with cirrhosis.



There are no suspicious osseous lesions.



IMPRESSION:



1. No acute abnormality in the chest.



2. Cirrhotic appearing liver with splenomegaly and ascites.



Dictated by: 



  Dictated on workstation # [id number]

## 2021-01-08 ENCOUNTER — HOSPITAL ENCOUNTER (EMERGENCY)
Dept: HOSPITAL 75 - ER | Age: 61
Discharge: HOME | End: 2021-01-08
Payer: MEDICAID

## 2021-01-08 VITALS — BODY MASS INDEX: 29.3 KG/M2 | WEIGHT: 165.35 LBS | HEIGHT: 62.99 IN

## 2021-01-08 VITALS — DIASTOLIC BLOOD PRESSURE: 110 MMHG | SYSTOLIC BLOOD PRESSURE: 203 MMHG

## 2021-01-08 DIAGNOSIS — Z88.8: ICD-10-CM

## 2021-01-08 DIAGNOSIS — Z88.5: ICD-10-CM

## 2021-01-08 DIAGNOSIS — I10: ICD-10-CM

## 2021-01-08 DIAGNOSIS — Z79.82: ICD-10-CM

## 2021-01-08 DIAGNOSIS — F17.210: ICD-10-CM

## 2021-01-08 DIAGNOSIS — K74.60: ICD-10-CM

## 2021-01-08 DIAGNOSIS — E11.40: ICD-10-CM

## 2021-01-08 DIAGNOSIS — K80.20: Primary | ICD-10-CM

## 2021-01-08 DIAGNOSIS — Z80.9: ICD-10-CM

## 2021-01-08 LAB
ALBUMIN SERPL-MCNC: 3.1 GM/DL (ref 3.2–4.5)
ALP SERPL-CCNC: 169 U/L (ref 40–136)
ALT SERPL-CCNC: 24 U/L (ref 0–55)
APTT BLD: 27 SEC (ref 24–35)
APTT PPP: YELLOW S
BACTERIA #/AREA URNS HPF: NEGATIVE /HPF
BARBITURATES UR QL: NEGATIVE
BASOPHILS # BLD AUTO: 0 10^3/UL (ref 0–0.1)
BASOPHILS NFR BLD AUTO: 0 % (ref 0–10)
BENZODIAZ UR QL SCN: NEGATIVE
BILIRUB SERPL-MCNC: 0.4 MG/DL (ref 0.1–1)
BILIRUB UR QL STRIP: NEGATIVE
BUN/CREAT SERPL: 16
CALCIUM SERPL-MCNC: 8.3 MG/DL (ref 8.5–10.1)
CHLORIDE SERPL-SCNC: 107 MMOL/L (ref 98–107)
CO2 SERPL-SCNC: 25 MMOL/L (ref 21–32)
COCAINE UR QL: NEGATIVE
CREAT SERPL-MCNC: 0.8 MG/DL (ref 0.6–1.3)
EOSINOPHIL # BLD AUTO: 0.1 10^3/UL (ref 0–0.3)
EOSINOPHIL NFR BLD AUTO: 2 % (ref 0–10)
FIBRINOGEN PPP-MCNC: CLEAR MG/DL
GFR SERPLBLD BASED ON 1.73 SQ M-ARVRAT: > 60 ML/MIN
GLUCOSE SERPL-MCNC: 167 MG/DL (ref 70–105)
GLUCOSE UR STRIP-MCNC: NEGATIVE MG/DL
HCT VFR BLD CALC: 31 % (ref 35–52)
HGB BLD-MCNC: 9.1 G/DL (ref 11.5–16)
INR PPP: 1.2 (ref 0.8–1.4)
KETONES UR QL STRIP: NEGATIVE
LEUKOCYTE ESTERASE UR QL STRIP: NEGATIVE
LYMPHOCYTES # BLD AUTO: 0.4 10^3/UL (ref 1–4)
LYMPHOCYTES NFR BLD AUTO: 12 % (ref 12–44)
MANUAL DIFFERENTIAL PERFORMED BLD QL: NO
MCH RBC QN AUTO: 23 PG (ref 25–34)
MCHC RBC AUTO-ENTMCNC: 29 G/DL (ref 32–36)
MCV RBC AUTO: 80 FL (ref 80–99)
METHADONE UR QL SCN: NEGATIVE
METHAMPHETAMINE SCREEN URINE S: POSITIVE
MONOCYTES # BLD AUTO: 0.2 10^3/UL (ref 0–1)
MONOCYTES NFR BLD AUTO: 6 % (ref 0–12)
NEUTROPHILS # BLD AUTO: 2.7 10^3/UL (ref 1.8–7.8)
NEUTROPHILS NFR BLD AUTO: 80 % (ref 42–75)
NITRITE UR QL STRIP: NEGATIVE
OPIATES UR QL SCN: NEGATIVE
OXYCODONE UR QL: NEGATIVE
PH UR STRIP: 7 [PH] (ref 5–9)
PLATELET # BLD: 230 10^3/UL (ref 130–400)
PMV BLD AUTO: 9.1 FL (ref 9–12.2)
POTASSIUM SERPL-SCNC: 3.6 MMOL/L (ref 3.6–5)
PROPOXYPH UR QL: NEGATIVE
PROT SERPL-MCNC: 7.7 GM/DL (ref 6.4–8.2)
PROT UR QL STRIP: (no result)
PROTHROMBIN TIME: 15.3 SEC (ref 12.2–14.7)
SODIUM SERPL-SCNC: 140 MMOL/L (ref 135–145)
SP GR UR STRIP: 1.01 (ref 1.02–1.02)
SQUAMOUS #/AREA URNS HPF: (no result) /HPF
TRICYCLICS UR QL SCN: NEGATIVE
WBC # BLD AUTO: 3.4 10^3/UL (ref 4.3–11)
WBC #/AREA URNS HPF: (no result) /HPF

## 2021-01-08 PROCEDURE — 81000 URINALYSIS NONAUTO W/SCOPE: CPT

## 2021-01-08 PROCEDURE — 80053 COMPREHEN METABOLIC PANEL: CPT

## 2021-01-08 PROCEDURE — 85610 PROTHROMBIN TIME: CPT

## 2021-01-08 PROCEDURE — 36415 COLL VENOUS BLD VENIPUNCTURE: CPT

## 2021-01-08 PROCEDURE — 74176 CT ABD & PELVIS W/O CONTRAST: CPT

## 2021-01-08 PROCEDURE — 84484 ASSAY OF TROPONIN QUANT: CPT

## 2021-01-08 PROCEDURE — 83605 ASSAY OF LACTIC ACID: CPT

## 2021-01-08 PROCEDURE — 80306 DRUG TEST PRSMV INSTRMNT: CPT

## 2021-01-08 PROCEDURE — 85730 THROMBOPLASTIN TIME PARTIAL: CPT

## 2021-01-08 PROCEDURE — 93005 ELECTROCARDIOGRAM TRACING: CPT

## 2021-01-08 PROCEDURE — 85025 COMPLETE CBC W/AUTO DIFF WBC: CPT

## 2021-01-08 PROCEDURE — 83690 ASSAY OF LIPASE: CPT

## 2021-01-08 PROCEDURE — 71046 X-RAY EXAM CHEST 2 VIEWS: CPT

## 2021-01-08 NOTE — NUR
Pt here with increasing abdominal pain today; states she was diagnosed with 
liver failure in Dec. Pt states that she had onset of epigastric pain today, 
worse than normal. Pt has marked bloating to abd. Pt admits to using meth. Pt 
to room and onto monitor where she is noted to be hypertensive. Cinthya at 
bedside. EKG obtained, IV started and blood drawn. 

Labs sent; will continue to monitor.

## 2021-01-08 NOTE — NUR
Shortly after administering the Zofran IV pt noted to have urticaria-like welp 
proximal to her IV site. Pt reports itching to these areas. IV d/c'd and warm 
compress to the site. Provider notified and to room for eval. Will continue to 
monitor.

## 2021-01-08 NOTE — DIAGNOSTIC IMAGING REPORT
INDICATION: Epigastric pain, history of liver failure.



TECHNIQUE: Multiple contiguous axial images were obtained through

the abdomen and pelvis without the use of intravenous contrast.

Auto Exposure Controls were utilized during the CT exam to meet

ALARA standards for radiation dose reduction. 



Comparison made to 07/12/2020.



The visualized portions of the lung bases showed some mild

atelectatic changes. There is no pleural fluid or free

intraperitoneal air.



There is moderate ascites, mildly increased in quantity compared

to the previous study. The liver shows no focal lesion without

contrast, the liver shows a nodular contour suspicious for

cirrhosis. There is splenomegaly. There is no adrenal lesion or

overt pancreatic lesion. Kidneys appear unremarkable. There is

diffuse edema in the mesentery and diffuse bowel wall thickening,

bowel wall thickening appears more focally prominent in the left

lower quadrant but this appeared similar on the prior study as

well. There are incidental gallstones in the gallbladder, similar

to the prior study.



IMPRESSION:



Findings compatible with cirrhosis of the liver with

splenomegaly. There is diffuse ascites of moderate severity which

is increased compared to the prior study. There is diffuse

mesenteric edema which appears worsened compared to the prior

study. There is diffuse small bowel wall thickening, more focally

prominent in the left lower quadrant, but appearing similar to

the prior study. There are incidental gallstones in the

gallbladder.



Dictated by: 



  Dictated on workstation # [id number]

## 2021-01-08 NOTE — DIAGNOSTIC IMAGING REPORT
INDICATION: Liver failure and painful inspiration.



PA and lateral chest obtained at 8:27 p.m. and compared to

08/07/2020.



Heart is mildly enlarged. Mediastinal silhouette is unremarkable.

 There is some mild linear scarring or atelectasis in the lung

bases but the aeration of the lung bases is improved compared to

the previous study.  There is no pneumothorax or pleural fluid.



IMPRESSION:



Mild bibasilar scarring or atelectasis with improved aeration of

both lung bases compared to 08/07/2020. No consolidation or

pleural fluid.



Dictated by: 



  Dictated on workstation # [id number]

## 2021-01-08 NOTE — ED ABDOMINAL PAIN
General


Chief Complaint:  Abdominal/GI Problems


Stated Complaint:  ABD PAIN


Nursing Triage Note:  


Pt is here with abdominal pain and bloating; states she was dx with liver 


failure in Dec.


Sepsis Screen:  No Definite Risk


Source of Information:  Patient


Exam Limitations:  No Limitations





History of Present Illness


Date Seen by Provider:  2021


Time Seen by Provider:  20:00


Initial Comments


This is a 60-year-old female who presented to the ER with complaints of 

intermittent sharp epigastric pain that radiates into her back and nausea x1 

week. She has a history of gallstones and is concerned this may be the cause.  

Also reports history of liver failure with cirrhosis and abdominal acites. 

States she has an appointment with a liver specialist at  on . 

Denies use of alcohol, but does admit to smoking meth yesterday. Also admits to 

not taking her home medications as prescribed, but has used her home Zofran with

some relief. Currently she is not having any pain. Denies fever, chills, cough, 

shortness of breath, chest pain, vomiting, dysuria, or hematuria.





Allergies and Home Medications


Allergies


Coded Allergies:  


     hydrocodone (Verified  Allergy, Unknown, 1/10/19)


     hydrochlorothiazide (Verified  Adverse Reaction, Unknown, DIARRHEA, )





Home Medications


Albuterol Sulfate 2.5 Mg/3 Ml Vial.neb, 2.5 MG NEB Q4H PRN for SHORTNESS OF 

BREATH, (Reported)


Albuterol Sulfate 1 Puff Puff, 2 PUFF IH Q4H


   1 PUFF = 90 MCG 


   Prescribed by: ANDRÉS HULL on 20 183


Aspirin 81 Mg Tablet.dr, 81 MG PO DAILY, (Reported)


   LAST FILLED #30 11-15-19 


Cefdinir 300 Mg Capsule, 300 MG PO BID


   Prescribed by: SKY JONES on 1/3/20 1112


Celecoxib 200 Mg Capsule, 200 MG PO DAILY, (Reported)


   LAST FILLED #30 19 


Ciprofloxacin HCl 500 Mg Tablet, 500 MG PO BID


   Prescribed by: AZEB DONALDSON on 20 1500


Folic Acid 1 Mg Tablet, 1 MG PO DAILY, (Reported)


   LAST FILLED #30 19 


Gabapentin 100 Mg Capsule, 100 MG PO BID, (Reported)


   LAST FILLED #60 10-07-19 


Hydrochlorothiazide 25 Mg Tablet, 25 MG PO DAILY, (Reported)


   LAST FILLED #30 19 


Levofloxacin 750 Mg Tablet, 750 MG PO DAILY


   Prescribed by: ANDRÉS HULL on 20


Levofloxacin 500 Mg Tablet, 500 MG PO DAILY


   Prescribed by: ANALY LAZARO on 21


Losartan Potassium 50 Mg Tablet, 50 MG PO DAILY, (Reported)


   LAST FILLED #30 19 


Metronidazole 500 Mg Tablet, 500 MG PO TID


   Prescribed by: AZEB DONALDSON on 20 1500


Ondansetron 4 Mg Tab.rapdis, 4 MG PO Q6H PRN for NAUSEA/VOMITING


   Prescribed by: AZEB DONALDSON on 20 1500


Potassium Chloride 10 Meq Tab.er.prt, 10 MEQ PO DAILY, (Reported)


   LAST FILLED #30 19 





Patient Home Medication List


Home Medication List Reviewed:  Yes





Review of Systems


Review of Systems


Constitutional:  no symptoms reported


EENTM:  No Symptoms Reported


Respiratory:  Other (occ. tenderness with inspiraiton )


Cardiovascular:  No Symptoms Reported


Gastrointestinal:  Abdomen Distended, Abdominal Pain; Denies Constipated, Denies

Diarrhea; Difficulty Swallowing, Nausea; Denies Rectal Bleeding, Denies Vomiting


Genitourinary:  No Symptoms Reported


Musculoskeletal:  no symptoms reported


Skin:  no symptoms reported


Psychiatric/Neurological:  No Symptoms Reported


Endocrine:  No Symptoms Reported


Hematologic/Lymphatic:  No Symptoms Reported





Past Medical-Social-Family Hx


Patient Social History


Alcohol Use:  Occasionally Uses


Recreational Drug Use:  Yes


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


Smoking Status:  Current Everyday Smoker


Type Used:  Cigarettes


2nd Hand Smoke Exposure:  Yes


Recent Foreign Travel:  No


Contact w/Someone Who Travel:  No


Recent Infectious Disease Expo:  No


Recent Hopitalizations:  No





Immunizations Up To Date


Tetanus Booster (TDap):  More than 5yrs


Date of Influenza Vaccine:  2019





Seasonal Allergies


Seasonal Allergies:  No





Past Medical History


Surgeries:  Yes (LITHOTRIPSY; FACIAL RECONSTRUCTION;  X 1-TWINS)


 Section, Renal, Tubal Ligation


Respiratory:  Yes


Pneumonia


Cardiac:  Yes (VASCULITIS OF LEGS)


Chronic Edema/Swelling, Hypertension, Peripheral Vascular


Neurological:  Yes


Neuropathy


GYN History:  Menopausal


Genitourinary:  Yes


Kidney Stones


Gastrointestinal:  Yes (HEPATITIS C--NO TREATMENT)


Hepatitis, Cirrhosis


Musculoskeletal:  Yes


Arthritis, Rheumatoid Arthritis


Endocrine:  Yes


Diabetes, Non-Insulin dep


HEENT:  No


Cancer:  No


Psychosocial:  Yes


Anxiety


Integumentary:  Yes (CHRONIC LEG EDEMA AND CELLULITIS ; VASCULITIS)


Blood Disorders:  No





Family Medical History


Heart Disease, Cancer, Diabetes





Physical Exam


Vital Signs





Vital Signs - First Documented








 21





 19:49


 


Temp 36.5


 


Pulse 105


 


Resp 20


 


B/P (MAP) 195/105 (135)


 


Pulse Ox 100


 


O2 Delivery Room Air





Capillary Refill : Less Than 3 Seconds


Height/Weight/BMI


Height: 5'3.00"


Weight: 159lbs. 0oz. [id number]; 29.00 BMI


Method:Stated


General Appearance:  WD/WN, no apparent distress


HEENT:  PERRL/EOMI, pharynx normal; No scleral icterus (R), No scleral icterus 

(L)


Neck:  full range of motion, supple


Respiratory:  lungs clear, normal breath sounds


Cardiovascular:  normal peripheral pulses, regular rate, rhythm, systolic murmur


Peripheral Pulses:  2+ Dorsalis Pedis (R), 2+ Left Dors-Pedis (L), 2+ Radial 

Pulses (R), 2+ Radial Pulses (L)


Gastrointestinal:  normal bowel sounds, soft, distended


Extremities:  normal range of motion, non-tender, no pedal edema, normal 

capillary refill


Neurologic/Psychiatric:  no motor/sensory deficits, alert, normal mood/affect, 

oriented x 3


Skin:  normal color, warm/dry





Focused Exam


Lactate Level


21 20:00: Lactic Acid Level 1.52





Lactic Acid Level





Laboratory Tests








Test


 21


20:00


 


Lactic Acid Level


 1.52 MMOL/L


(0.50-2.00)











Progress/Results/Core Measures


Results/Orders


Lab Results





Laboratory Tests








Test


 21


20:00 Range/Units


 


 


White Blood Count


 3.4 L


 4.3-11.0


10^3/uL


 


Red Blood Count


 3.90 


 3.80-5.11


10^6/uL


 


Hemoglobin 9.1 L 11.5-16.0  g/dL


 


Hematocrit 31 L 35-52  %


 


Mean Corpuscular Volume 80  80-99  fL


 


Mean Corpuscular Hemoglobin 23 L 25-34  pg


 


Mean Corpuscular Hemoglobin


Concent 29 L


 32-36  g/dL





 


Red Cell Distribution Width 15.7 H 10.0-14.5  %


 


Platelet Count


 230 


 130-400


10^3/uL


 


Mean Platelet Volume 9.1  9.0-12.2  fL


 


Immature Granulocyte % (Auto) 0   %


 


Neutrophils (%) (Auto) 80 H 42-75  %


 


Lymphocytes (%) (Auto) 12  12-44  %


 


Monocytes (%) (Auto) 6  0-12  %


 


Eosinophils (%) (Auto) 2  0-10  %


 


Basophils (%) (Auto) 0  0-10  %


 


Neutrophils # (Auto)


 2.7 


 1.8-7.8


10^3/uL


 


Lymphocytes # (Auto)


 0.4 L


 1.0-4.0


10^3/uL


 


Monocytes # (Auto)


 0.2 


 0.0-1.0


10^3/uL


 


Eosinophils # (Auto)


 0.1 


 0.0-0.3


10^3/uL


 


Basophils # (Auto)


 0.0 


 0.0-0.1


10^3/uL


 


Immature Granulocyte # (Auto)


 0.0 


 0.0-0.1


10^3/uL


 


Prothrombin Time 15.3 H 12.2-14.7  SEC


 


INR Comment 1.2  0.8-1.4  


 


Activated Partial


Thromboplast Time 27 


 24-35  SEC





 


Urine Color YELLOW   


 


Urine Clarity CLEAR   


 


Urine pH 7.0  5-9  


 


Urine Specific Gravity 1.010 L 1.016-1.022  


 


Urine Protein TRACE H NEGATIVE  


 


Urine Glucose (UA) NEGATIVE  NEGATIVE  


 


Urine Ketones NEGATIVE  NEGATIVE  


 


Urine Nitrite NEGATIVE  NEGATIVE  


 


Urine Bilirubin NEGATIVE  NEGATIVE  


 


Urine Urobilinogen 0.2  < = 1.0  MG/DL


 


Urine Leukocyte Esterase NEGATIVE  NEGATIVE  


 


Urine RBC (Auto) 2+ H NEGATIVE  


 


Urine RBC 10-25 H  /HPF


 


Urine WBC NONE   /HPF


 


Urine Squamous Epithelial


Cells 0-2 


  /HPF





 


Urine Crystals NONE   /LPF


 


Urine Bacteria NEGATIVE   /HPF


 


Urine Casts NONE   /LPF


 


Urine Mucus SMALL H  /LPF


 


Urine Culture Indicated NO   


 


Sodium Level 140  135-145  MMOL/L


 


Potassium Level 3.6  3.6-5.0  MMOL/L


 


Chloride Level 107    MMOL/L


 


Carbon Dioxide Level 25  21-32  MMOL/L


 


Anion Gap 8  5-14  MMOL/L


 


Blood Urea Nitrogen 13  7-18  MG/DL


 


Creatinine


 0.80 


 0.60-1.30


MG/DL


 


Estimat Glomerular Filtration


Rate > 60 


  





 


BUN/Creatinine Ratio 16   


 


Glucose Level 167 H   MG/DL


 


Lactic Acid Level


 1.52 


 0.50-2.00


MMOL/L


 


Calcium Level 8.3 L 8.5-10.1  MG/DL


 


Corrected Calcium 9.0  8.5-10.1  MG/DL


 


Total Bilirubin 0.4  0.1-1.0  MG/DL


 


Aspartate Amino Transf


(AST/SGOT) 28 


 5-34  U/L





 


Alanine Aminotransferase


(ALT/SGPT) 24 


 0-55  U/L





 


Alkaline Phosphatase 169 H   U/L


 


Troponin I < 0.028  <0.028  NG/ML


 


Total Protein 7.7  6.4-8.2  GM/DL


 


Albumin 3.1 L 3.2-4.5  GM/DL


 


Lipase 57  8-78  U/L


 


Urine Opiates Screen NEGATIVE  NEGATIVE  


 


Urine Oxycodone Screen NEGATIVE  NEGATIVE  


 


Urine Methadone Screen NEGATIVE  NEGATIVE  


 


Urine Propoxyphene Screen NEGATIVE  NEGATIVE  


 


Urine Barbiturates Screen NEGATIVE  NEGATIVE  


 


Ur Tricyclic Antidepressants


Screen NEGATIVE 


 NEGATIVE  





 


Urine Phencyclidine Screen NEGATIVE  NEGATIVE  


 


Urine Amphetamines Screen POSITIVE H NEGATIVE  


 


Urine Methamphetamines Screen POSITIVE H NEGATIVE  


 


Urine Benzodiazepines Screen NEGATIVE  NEGATIVE  


 


Urine Cocaine Screen NEGATIVE  NEGATIVE  


 


Urine Cannabinoids Screen POSITIVE H NEGATIVE  








My Orders





Orders - ANALY LAZARO APRN


Cbc With Automated Diff (21 20:00)


Comprehensive Metabolic Panel (21 20:00)


Troponin I (21 20:00)


Ekg Tracing (21 20:00)


Protime With Inr (21 20:00)


Partial Thromboplastin Time (21 20:00)


Lipase (21 20:00)


Iv Heplock-Insert (Order) (21 20:00)


Drug Screen Stat (Urine) (21 20:00)


Chest Pa/Lat (2 View) (21 20:17)


Lactic Acid Analyzer (21 20:22)


Ondansetron Injection (Zofran Injectio (21 20:30)


Ct Abdomen/Pelvis Wo (21 20:32)





Medications Given in ED





Vital Signs/I&O











 21





 19:49 22:17


 


Temp 36.5 


 


Pulse 105 91


 


Resp 20 18


 


B/P (MAP) 195/105 (135) 203/110


 


Pulse Ox 100 98


 


O2 Delivery Room Air Room Air














Blood Pressure Mean:                    135











Progress


Progress Note :  


Progress Note


Patient examined. Pain is described as typical colic gallbladder pain, however 

she does not have pain at this time, only nausea. Zofran 4mg IV ordered. 





Labs reviewed. Hgb stable, no WCB elevation, troponin neg, lipase neg. CXR 

stable with no infiltrates or fluid. CT abd/pelvis shows incidental gallstones 

in gallbladder and diffuse edema in the mesentery and bowel wall thickening. 

This could be infectious or from low albumin and cirrhosis. Reviewed case with 

Dr. Jung. Recommend patient follow up with surgeon at  d/t her being a poor 

surgical candidate d/t h/o of liver cirrhosis with acities. Will place on 

Levaquin and have her follow up with  as previously scheduled. 





Blood pressure elevated through ED course. States she has not been taking her 

medications as prescribed. Offered to give her antihypertensive medications 

here, she declined stating she would take her meds when she goes home. Reviewed 

discharge plan and she is agreeable with plan.





Diagnostic Imaging





   Diagonstic Imaging:  CT


   Plain Films/CT/US/NM/MRI:  abdomen, pelvis


Comments


NAME:   SOLO LOPEZ


[facility] REC#:   [id number]


ACCOUNT#:   [id number]


PT STATUS:   REG ER


:   1960


PHYSICIAN:   ANALY LAZARO APRN


ADMIT DATE:   21/ER


                                  ***Signed***


Date of Exam:21





CT ABDOMEN/PELVIS WO








INDICATION: Epigastric pain, history of liver failure.





TECHNIQUE: Multiple contiguous axial images were obtained through


the abdomen and pelvis without the use of intravenous contrast.


Auto Exposure Controls were utilized during the CT exam to meet


ALARA standards for radiation dose reduction. 





Comparison made to 2020.





The visualized portions of the lung bases showed some mild


atelectatic changes. There is no pleural fluid or free


intraperitoneal air.





There is moderate ascites, mildly increased in quantity compared


to the previous study. The liver shows no focal lesion without


contrast, the liver shows a nodular contour suspicious for


cirrhosis. There is splenomegaly. There is no adrenal lesion or


overt pancreatic lesion. Kidneys appear unremarkable. There is


diffuse edema in the mesentery and diffuse bowel wall thickening,


bowel wall thickening appears more focally prominent in the left


lower quadrant but this appeared similar on the prior study as


well. There are incidental gallstones in the gallbladder, similar


to the prior study.





IMPRESSION:





Findings compatible with cirrhosis of the liver with


splenomegaly. There is diffuse ascites of moderate severity which


is increased compared to the prior study. There is diffuse


mesenteric edema which appears worsened compared to the prior


study. There is diffuse small bowel wall thickening, more focally


prominent in the left lower quadrant, but appearing similar to


the prior study. There are incidental gallstones in the


gallbladder.





Dictated by: 





  Dictated on workstation # [id number]








Dict:   21


Trans:   21


CV [id number]





Interpreted by:     ERICA SEVILLA MD


Electronically signed by: ERICA SEVILLA MD 21








   Diagonstic Imaging:  Xray


   Plain Films/CT/US/NM/MRI:  chest


Comments


NAME:   SOLO LOPEZ


[facility] REC#:   [id number]


ACCOUNT#:   [id number]


PT STATUS:   REG ER


:   1960


PHYSICIAN:   ANALY LAZARO


ADMIT DATE:   21/ER


***Signed***


Date of Exam:21





CHEST PA/LAT (2 VIEW)








INDICATION: Liver failure and painful inspiration.





PA and lateral chest obtained at 8:27 p.m. and compared to


2020.





Heart is mildly enlarged. Mediastinal silhouette is unremarkable.


 There is some mild linear scarring or atelectasis in the lung


bases but the aeration of the lung bases is improved compared to


the previous study.  There is no pneumothorax or pleural fluid.





IMPRESSION:





Mild bibasilar scarring or atelectasis with improved aeration of


both lung bases compared to 2020. No consolidation or


pleural fluid.





Dictated by: 





  Dictated on workstation # [id number]








Dict:   21


Trans:   21


CV [id number]





Interpreted by:     ERICA SEVILLA MD


Electronically signed by: ERICA SEVILLA MD 21





Departure


Communication (Admissions)


Time/Spoke to Consulting Phy:  21:48


Discussed with Dr. Jung.





Impression





   Primary Impression:  


   Cholelithiasis


   Additional Impression:  


   Cirrhosis of liver


Disposition:   HOME, SELF-CARE


Condition:  Improved





Departure-Patient Inst.


Decision time for Depature:  21:51


Referrals:  


[facility] [place] ([place])


Primary Care Physician








JENARO SCHULER MD (Family)


Primary Care Physician


Patient Instructions:  Cirrhosis, Gallstones





Add. Discharge Instructions:  


Plan:


1. Discharge home. To keep follow up with  liver specialist on  as 

previously scheduled.  


2. Clear liquid diet for the next 2-3 days. Avoid fatty, greasy foods as this 

can worsen your symptoms.


3. Stop smoking methamphetamines and take your medications as directed.


4. Follow up with your primary care provider if your symptoms persist. 


5. Take antibiotics daily as directed. 


6. Return for any new or concerning symptoms. 





All discharge instructions reviewed with patient and/or family. Voiced 

understanding.


Scripts


Levofloxacin (Levofloxacin) 500 Mg Tablet


500 MG PO DAILY for 7 Days, #7 TAB 0 Refills


   Prov: ANALY LAZARO APRN         21











ANALY LAZARO APRN            2021 20:09

## 2021-04-12 ENCOUNTER — HOSPITAL ENCOUNTER (OUTPATIENT)
Dept: HOSPITAL 75 - RAD FS | Age: 61
End: 2021-04-12
Attending: INTERNAL MEDICINE
Payer: MEDICAID

## 2021-04-12 DIAGNOSIS — B18.2: Primary | ICD-10-CM

## 2021-04-12 DIAGNOSIS — K80.20: ICD-10-CM

## 2021-04-12 DIAGNOSIS — K74.60: ICD-10-CM

## 2021-04-12 DIAGNOSIS — K82.8: ICD-10-CM

## 2021-04-12 DIAGNOSIS — K76.6: ICD-10-CM

## 2021-04-12 PROCEDURE — 76700 US EXAM ABDOM COMPLETE: CPT

## 2021-04-12 NOTE — DIAGNOSTIC IMAGING REPORT
REASON FOR EXAM:  Hepatitis C..  Followup.



COMPARISON:  01/08/2021.



TECHNIQUE: Complete abdominal sonogram.



FINDINGS: The liver demonstrates a micronodular contour with

heterogeneous echotexture. No focal hepatic lesions are seen. No

intra or extrahepatic biliary dilatation is present.  The common

bile duct is nondilated and measures 6 cm.



Cholelithiasis is present. There is gallbladder wall thickening

measuring 0.5 cm. No pericholecystic fluid is seen.. Sonographic

Rosales's sign is negative.



The visualized portions of the head and proximal body of the

pancreas are within normal limits. The distal body and tail are

not well visualized due to overlying bowel gas.   



Both kidneys are normal in size and echogenicity. The cortical

thickness and the corticomedullary differentiation is well

maintained.  The right kidney measures 10.1 cm.  The left kidney

measures 10.7 cm.  There is no evidence of calculi, focal mass or

hydronephrosis.



Splenomegaly is noted with the spleen measuring 20 cm

craniocaudal. No focal splenic lesions are seen. The visualized

upper aorta and IVC are normal in course and caliber. There is no

ascites in the upper abdomen. 



IMPRESSION: 

1. Cirrhotic morphology of the liver, consistent with a history

of hepatitis C. No focal hepatic lesions are seen. Recommend

continued followup as indicated.

2. Stigmata of portal hypertension with marked splenomegaly. The

portal vein is patent and demonstrates hepatopetal flow. No

ascites is present.

3. Cholelithiasis with gallbladder wall thickening. The

sonographic Rosales sign is negative.



Dictated by: 



  Dictated on workstation # [id number]

## 2021-10-19 ENCOUNTER — HOSPITAL ENCOUNTER (OUTPATIENT)
Dept: HOSPITAL 75 - LAB FS | Age: 61
End: 2021-10-19
Attending: INTERNAL MEDICINE
Payer: MEDICAID

## 2021-10-19 DIAGNOSIS — D50.0: Primary | ICD-10-CM

## 2021-10-19 LAB
%HYPO/RBC NFR BLD AUTO: (no result) %
ANISOCYTOSIS BLD QL SMEAR: (no result)
BASOPHILS # BLD AUTO: 0 10^3/UL (ref 0–0.1)
BASOPHILS NFR BLD AUTO: 0 % (ref 0–10)
BASOPHILS NFR BLD MANUAL: 1 %
BLD SMEAR INTERP: (no result)
DACRYOCYTES BLD QL SMEAR: SLIGHT
EOSINOPHIL # BLD AUTO: 0.1 10^3/UL (ref 0–0.3)
EOSINOPHIL NFR BLD AUTO: 3 % (ref 0–10)
EOSINOPHIL NFR BLD MANUAL: 2 %
HCT VFR BLD CALC: 29 % (ref 35–52)
HGB BLD-MCNC: 8.1 G/DL (ref 11.5–16)
LYMPHOCYTES # BLD AUTO: 0.4 X 10^3 (ref 1–4)
LYMPHOCYTES NFR BLD AUTO: 14 % (ref 12–44)
MANUAL DIFFERENTIAL PERFORMED BLD QL: YES
MCH RBC QN AUTO: 20 PG (ref 25–34)
MCHC RBC AUTO-ENTMCNC: 28 G/DL (ref 32–36)
MCV RBC AUTO: 73 FL (ref 80–99)
MICROCYTES BLD QL SMEAR: (no result)
MONOCYTES # BLD AUTO: 0.3 X 10^3 (ref 0–1)
MONOCYTES NFR BLD AUTO: 10 % (ref 0–12)
MONOCYTES NFR BLD: 6 %
NEUTROPHILS # BLD AUTO: 2.1 X 10^3 (ref 1.8–7.8)
NEUTROPHILS NFR BLD AUTO: 72 % (ref 42–75)
NEUTS BAND NFR BLD MANUAL: 77 %
OVALOCYTES BLD QL SMEAR: (no result)
PLATELET # BLD EST: (no result) 10*3/UL
PLATELET # BLD: 71 10^3/UL (ref 130–400)
POIKILOCYTOSIS BLD QL SMEAR: (no result)
POLYCHROMASIA BLD QL SMEAR: (no result)
RBC MORPH BLD: (no result)
VARIANT LYMPHS NFR BLD MANUAL: 14 %
WBC # BLD AUTO: 2.9 10^3/UL (ref 4.3–11)

## 2021-10-19 PROCEDURE — 36415 COLL VENOUS BLD VENIPUNCTURE: CPT

## 2021-10-19 PROCEDURE — 85027 COMPLETE CBC AUTOMATED: CPT

## 2021-10-19 PROCEDURE — 85007 BL SMEAR W/DIFF WBC COUNT: CPT

## 2021-12-21 ENCOUNTER — HOSPITAL ENCOUNTER (OUTPATIENT)
Dept: HOSPITAL 75 - SDC | Age: 61
Discharge: HOME | End: 2021-12-21
Attending: INTERNAL MEDICINE
Payer: MEDICAID

## 2021-12-21 VITALS — HEIGHT: 64.76 IN | BODY MASS INDEX: 25.32 KG/M2 | WEIGHT: 150.13 LBS

## 2021-12-21 VITALS — SYSTOLIC BLOOD PRESSURE: 141 MMHG | DIASTOLIC BLOOD PRESSURE: 69 MMHG

## 2021-12-21 DIAGNOSIS — D50.9: Primary | ICD-10-CM

## 2021-12-21 PROCEDURE — 96365 THER/PROPH/DIAG IV INF INIT: CPT

## 2022-01-20 ENCOUNTER — HOSPITAL ENCOUNTER (OUTPATIENT)
Dept: HOSPITAL 75 - WOUNDCARE | Age: 62
End: 2022-01-20
Attending: FAMILY MEDICINE
Payer: COMMERCIAL

## 2022-01-20 ENCOUNTER — HOSPITAL ENCOUNTER (OUTPATIENT)
Dept: HOSPITAL 75 - RAD | Age: 62
End: 2022-01-20
Attending: FAMILY MEDICINE
Payer: MEDICAID

## 2022-01-20 DIAGNOSIS — L03.116: ICD-10-CM

## 2022-01-20 DIAGNOSIS — L97.222: Primary | ICD-10-CM

## 2022-01-20 DIAGNOSIS — R73.9: ICD-10-CM

## 2022-01-20 DIAGNOSIS — I73.89: ICD-10-CM

## 2022-01-20 DIAGNOSIS — L97.222: ICD-10-CM

## 2022-01-20 DIAGNOSIS — F15.19: ICD-10-CM

## 2022-01-20 DIAGNOSIS — T65.222A: ICD-10-CM

## 2022-01-20 DIAGNOSIS — T65.222A: Primary | ICD-10-CM

## 2022-01-20 PROCEDURE — 11042 DBRDMT SUBQ TIS 1ST 20SQCM/<: CPT

## 2022-01-20 PROCEDURE — 73590 X-RAY EXAM OF LOWER LEG: CPT

## 2022-01-20 NOTE — DIAGNOSTIC IMAGING REPORT
INDICATION: Left calf pain



AP and lateral views of the left tibia and fibula show no

fracture, dislocation or other acute abnormalities. There are no

radiopaque foreign objects seen.



IMPRESSION: Unremarkable left tibia and fibula.



Dictated by: 



  Dictated on workstation # [id number]

## 2022-01-26 ENCOUNTER — HOSPITAL ENCOUNTER (OUTPATIENT)
Dept: HOSPITAL 75 - WOUNDCARE | Age: 62
End: 2022-01-26
Attending: FAMILY MEDICINE
Payer: MEDICAID

## 2022-01-26 DIAGNOSIS — L03.116: ICD-10-CM

## 2022-01-26 DIAGNOSIS — T65.222A: ICD-10-CM

## 2022-01-26 DIAGNOSIS — R73.9: ICD-10-CM

## 2022-01-26 DIAGNOSIS — L97.222: ICD-10-CM

## 2022-01-26 DIAGNOSIS — I96: Primary | ICD-10-CM

## 2022-01-26 DIAGNOSIS — F15.19: ICD-10-CM

## 2022-01-26 PROCEDURE — 11042 DBRDMT SUBQ TIS 1ST 20SQCM/<: CPT

## 2022-02-08 ENCOUNTER — HOSPITAL ENCOUNTER (OUTPATIENT)
Dept: HOSPITAL 75 - SDC | Age: 62
End: 2022-02-08
Attending: INTERNAL MEDICINE
Payer: MEDICAID

## 2022-02-08 VITALS — WEIGHT: 156.31 LBS | BODY MASS INDEX: 27.7 KG/M2 | HEIGHT: 62.99 IN

## 2022-02-08 VITALS — SYSTOLIC BLOOD PRESSURE: 150 MMHG | DIASTOLIC BLOOD PRESSURE: 82 MMHG

## 2022-02-08 DIAGNOSIS — D50.0: Primary | ICD-10-CM

## 2022-02-08 PROCEDURE — 96365 THER/PROPH/DIAG IV INF INIT: CPT

## 2022-02-09 ENCOUNTER — HOSPITAL ENCOUNTER (OUTPATIENT)
Dept: HOSPITAL 75 - WOUNDCARE | Age: 62
End: 2022-02-09
Attending: FAMILY MEDICINE
Payer: MEDICAID

## 2022-02-09 DIAGNOSIS — F15.19: ICD-10-CM

## 2022-02-09 DIAGNOSIS — I73.89: ICD-10-CM

## 2022-02-09 DIAGNOSIS — D89.1: ICD-10-CM

## 2022-02-09 DIAGNOSIS — R73.9: ICD-10-CM

## 2022-02-09 DIAGNOSIS — L97.222: ICD-10-CM

## 2022-02-09 DIAGNOSIS — I73.00: ICD-10-CM

## 2022-02-09 DIAGNOSIS — T65.222A: Primary | ICD-10-CM

## 2022-02-09 PROCEDURE — 11104 PUNCH BX SKIN SINGLE LESION: CPT

## 2022-02-14 ENCOUNTER — HOSPITAL ENCOUNTER (OUTPATIENT)
Dept: HOSPITAL 75 - WOUNDCARE | Age: 62
End: 2022-02-14
Attending: FAMILY MEDICINE
Payer: MEDICAID

## 2022-02-14 DIAGNOSIS — F15.19: ICD-10-CM

## 2022-02-14 DIAGNOSIS — I73.89: ICD-10-CM

## 2022-02-14 DIAGNOSIS — D89.1: ICD-10-CM

## 2022-02-14 DIAGNOSIS — I73.01: ICD-10-CM

## 2022-02-14 DIAGNOSIS — R73.9: ICD-10-CM

## 2022-02-14 DIAGNOSIS — T65.222A: Primary | ICD-10-CM

## 2022-02-14 DIAGNOSIS — L97.222: ICD-10-CM

## 2022-02-14 PROCEDURE — 11042 DBRDMT SUBQ TIS 1ST 20SQCM/<: CPT

## 2022-02-21 ENCOUNTER — HOSPITAL ENCOUNTER (OUTPATIENT)
Dept: HOSPITAL 75 - WOUNDCARE | Age: 62
End: 2022-02-21
Attending: FAMILY MEDICINE
Payer: MEDICAID

## 2022-02-21 DIAGNOSIS — I96: Primary | ICD-10-CM

## 2022-02-21 DIAGNOSIS — D89.1: ICD-10-CM

## 2022-02-21 DIAGNOSIS — L97.222: ICD-10-CM

## 2022-02-21 DIAGNOSIS — T65.222A: ICD-10-CM

## 2022-02-21 DIAGNOSIS — R73.9: ICD-10-CM

## 2022-02-21 DIAGNOSIS — F15.19: ICD-10-CM

## 2022-02-21 PROCEDURE — 11042 DBRDMT SUBQ TIS 1ST 20SQCM/<: CPT

## 2022-02-28 ENCOUNTER — HOSPITAL ENCOUNTER (OUTPATIENT)
Dept: HOSPITAL 75 - WOUNDCARE | Age: 62
End: 2022-02-28
Attending: FAMILY MEDICINE
Payer: MEDICAID

## 2022-02-28 DIAGNOSIS — I73.01: ICD-10-CM

## 2022-02-28 DIAGNOSIS — L97.522: ICD-10-CM

## 2022-02-28 DIAGNOSIS — T65.222A: Primary | ICD-10-CM

## 2022-02-28 DIAGNOSIS — I87.312: ICD-10-CM

## 2022-02-28 DIAGNOSIS — I73.89: ICD-10-CM

## 2022-02-28 DIAGNOSIS — F15.19: ICD-10-CM

## 2022-02-28 DIAGNOSIS — D89.1: ICD-10-CM

## 2022-02-28 DIAGNOSIS — R73.9: ICD-10-CM

## 2022-02-28 PROCEDURE — 11042 DBRDMT SUBQ TIS 1ST 20SQCM/<: CPT

## 2022-03-07 ENCOUNTER — HOSPITAL ENCOUNTER (OUTPATIENT)
Dept: HOSPITAL 75 - WOUNDCARE | Age: 62
End: 2022-03-07
Attending: FAMILY MEDICINE
Payer: MEDICAID

## 2022-03-07 DIAGNOSIS — T65.222A: Primary | ICD-10-CM

## 2022-03-07 DIAGNOSIS — D89.1: ICD-10-CM

## 2022-03-07 DIAGNOSIS — R73.9: ICD-10-CM

## 2022-03-07 DIAGNOSIS — I73.00: ICD-10-CM

## 2022-03-07 DIAGNOSIS — F15.19: ICD-10-CM

## 2022-03-07 DIAGNOSIS — L97.222: ICD-10-CM

## 2022-03-07 PROCEDURE — 11042 DBRDMT SUBQ TIS 1ST 20SQCM/<: CPT

## 2022-03-14 ENCOUNTER — HOSPITAL ENCOUNTER (OUTPATIENT)
Dept: HOSPITAL 75 - WOUNDCARE | Age: 62
End: 2022-03-14
Attending: FAMILY MEDICINE
Payer: MEDICAID

## 2022-03-14 DIAGNOSIS — I73.89: ICD-10-CM

## 2022-03-14 DIAGNOSIS — T65.222A: Primary | ICD-10-CM

## 2022-03-14 DIAGNOSIS — F15.19: ICD-10-CM

## 2022-03-14 DIAGNOSIS — D89.1: ICD-10-CM

## 2022-03-14 DIAGNOSIS — L97.222: ICD-10-CM

## 2022-03-14 DIAGNOSIS — R73.9: ICD-10-CM

## 2022-03-14 DIAGNOSIS — I73.01: ICD-10-CM

## 2022-03-14 PROCEDURE — 11042 DBRDMT SUBQ TIS 1ST 20SQCM/<: CPT

## 2022-03-21 ENCOUNTER — HOSPITAL ENCOUNTER (OUTPATIENT)
Dept: HOSPITAL 75 - WOUNDCARE | Age: 62
End: 2022-03-21
Attending: FAMILY MEDICINE
Payer: MEDICAID

## 2022-03-21 DIAGNOSIS — I73.89: ICD-10-CM

## 2022-03-21 DIAGNOSIS — R73.9: ICD-10-CM

## 2022-03-21 DIAGNOSIS — L97.222: ICD-10-CM

## 2022-03-21 DIAGNOSIS — I73.01: ICD-10-CM

## 2022-03-21 DIAGNOSIS — F15.19: ICD-10-CM

## 2022-03-21 DIAGNOSIS — T65.222A: Primary | ICD-10-CM

## 2022-03-21 DIAGNOSIS — D89.1: ICD-10-CM

## 2022-03-21 PROCEDURE — 11042 DBRDMT SUBQ TIS 1ST 20SQCM/<: CPT

## 2022-03-28 ENCOUNTER — HOSPITAL ENCOUNTER (OUTPATIENT)
Dept: HOSPITAL 75 - WOUNDCARE | Age: 62
End: 2022-03-28
Attending: FAMILY MEDICINE
Payer: MEDICAID

## 2022-03-28 DIAGNOSIS — T65.222A: Primary | ICD-10-CM

## 2022-03-28 DIAGNOSIS — I73.89: ICD-10-CM

## 2022-03-28 DIAGNOSIS — I73.00: ICD-10-CM

## 2022-03-28 DIAGNOSIS — D89.1: ICD-10-CM

## 2022-03-28 DIAGNOSIS — L97.222: ICD-10-CM

## 2022-03-28 DIAGNOSIS — F15.19: ICD-10-CM

## 2022-03-28 DIAGNOSIS — R73.9: ICD-10-CM

## 2022-03-28 DIAGNOSIS — I87.312: ICD-10-CM

## 2022-03-28 PROCEDURE — 11042 DBRDMT SUBQ TIS 1ST 20SQCM/<: CPT

## 2022-04-04 ENCOUNTER — HOSPITAL ENCOUNTER (OUTPATIENT)
Dept: HOSPITAL 75 - WOUNDCARE | Age: 62
End: 2022-04-04
Attending: FAMILY MEDICINE
Payer: MEDICAID

## 2022-04-04 DIAGNOSIS — T65.222A: Primary | ICD-10-CM

## 2022-04-04 DIAGNOSIS — I73.00: ICD-10-CM

## 2022-04-04 DIAGNOSIS — L97.222: ICD-10-CM

## 2022-04-04 DIAGNOSIS — F15.19: ICD-10-CM

## 2022-04-04 DIAGNOSIS — D89.1: ICD-10-CM

## 2022-04-04 DIAGNOSIS — I87.312: ICD-10-CM

## 2022-04-04 DIAGNOSIS — R73.9: ICD-10-CM

## 2022-04-04 DIAGNOSIS — I73.89: ICD-10-CM

## 2022-04-04 PROCEDURE — 11042 DBRDMT SUBQ TIS 1ST 20SQCM/<: CPT

## 2022-04-11 ENCOUNTER — HOSPITAL ENCOUNTER (OUTPATIENT)
Dept: HOSPITAL 75 - WOUNDCARE | Age: 62
End: 2022-04-11
Attending: FAMILY MEDICINE
Payer: MEDICAID

## 2022-04-11 DIAGNOSIS — I73.01: ICD-10-CM

## 2022-04-11 DIAGNOSIS — R73.9: ICD-10-CM

## 2022-04-11 DIAGNOSIS — I73.89: ICD-10-CM

## 2022-04-11 DIAGNOSIS — I87.312: ICD-10-CM

## 2022-04-11 DIAGNOSIS — L97.522: ICD-10-CM

## 2022-04-11 DIAGNOSIS — T65.222A: Primary | ICD-10-CM

## 2022-04-11 DIAGNOSIS — D89.1: ICD-10-CM

## 2022-04-11 DIAGNOSIS — F15.19: ICD-10-CM

## 2022-04-11 PROCEDURE — 11042 DBRDMT SUBQ TIS 1ST 20SQCM/<: CPT

## 2022-04-20 ENCOUNTER — HOSPITAL ENCOUNTER (OUTPATIENT)
Dept: HOSPITAL 75 - WOUNDCARE | Age: 62
End: 2022-04-20
Attending: FAMILY MEDICINE
Payer: MEDICAID

## 2022-04-20 DIAGNOSIS — F15.959: ICD-10-CM

## 2022-04-20 DIAGNOSIS — I73.00: ICD-10-CM

## 2022-04-20 DIAGNOSIS — I73.9: ICD-10-CM

## 2022-04-20 DIAGNOSIS — D89.1: ICD-10-CM

## 2022-04-20 DIAGNOSIS — R73.9: ICD-10-CM

## 2022-04-20 DIAGNOSIS — T65.222A: Primary | ICD-10-CM

## 2022-04-20 PROCEDURE — 99212 OFFICE O/P EST SF 10 MIN: CPT

## 2022-05-14 ENCOUNTER — HOSPITAL ENCOUNTER (EMERGENCY)
Dept: HOSPITAL 75 - ER FS | Age: 62
LOS: 1 days | Discharge: HOME | End: 2022-05-15
Payer: MEDICAID

## 2022-05-14 VITALS — HEIGHT: 62.99 IN | BODY MASS INDEX: 26.64 KG/M2 | WEIGHT: 150.36 LBS

## 2022-05-14 DIAGNOSIS — N30.01: Primary | ICD-10-CM

## 2022-05-14 DIAGNOSIS — F17.210: ICD-10-CM

## 2022-05-14 DIAGNOSIS — Z87.442: ICD-10-CM

## 2022-05-14 PROCEDURE — 81000 URINALYSIS NONAUTO W/SCOPE: CPT

## 2022-05-14 PROCEDURE — 74176 CT ABD & PELVIS W/O CONTRAST: CPT

## 2022-05-15 VITALS — DIASTOLIC BLOOD PRESSURE: 61 MMHG | SYSTOLIC BLOOD PRESSURE: 158 MMHG

## 2022-05-15 LAB
APTT PPP: YELLOW S
BACTERIA #/AREA URNS HPF: (no result) /HPF
BILIRUB UR QL STRIP: NEGATIVE
CAOX CRY #/AREA URNS LPF: (no result) /LPF
FIBRINOGEN PPP-MCNC: CLEAR MG/DL
GLUCOSE UR STRIP-MCNC: NEGATIVE MG/DL
HYALINE CASTS #/AREA URNS LPF: (no result) /LPF
KETONES UR QL STRIP: (no result)
LEUKOCYTE ESTERASE UR QL STRIP: NEGATIVE
NITRITE UR QL STRIP: NEGATIVE
PH UR STRIP: 6 [PH] (ref 5–9)
PROT UR QL STRIP: (no result)
RBC #/AREA URNS HPF: (no result) /HPF
SP GR UR STRIP: >=1.03 (ref 1.02–1.02)
SQUAMOUS #/AREA URNS HPF: (no result) /HPF
WBC #/AREA URNS HPF: (no result) /HPF

## 2022-05-15 NOTE — DIAGNOSTIC IMAGING REPORT
PROCEDURE: CT abdomen and pelvis without contrast.



TECHNIQUE: Multiple contiguous axial images were obtained through

the abdomen and pelvis without the use of intravenous contrast.

Auto Exposure Controls were utilized during the CT exam to meet

ALARA standards for radiation dose reduction. 



INDICATION: Right flank pain.



COMPARISON: 01/08/2021



FINDINGS: Included portions of the lung bases are clear. Heart is

mildly enlarged. Note is made of diminished attenuation to the

intravascular contents suggestive of underlying anemia.



CT ABDOMEN: There is mild-to-moderate stranding of the right

upper abdominal quadrant epicentered around the pancreatic head

extending to the 2nd and 3rd portions of duodenum as well as

along the central mesenteric fat. Note is made of multiple

gallstones within the lumen of the gallbladder.



The spleen is enlarged. It measures 14.7 x 7.9 x 20 cm. This was

present previously. No focal splenic masses are seen on this

noncontrast exam



There is trace ascites within the right paracolic gutter.

Overall, degree of ascites is improved compared to prior exam.

There is no loculated fluid collection or free air.



The kidneys and adrenal glands have an unremarkable noncontrast

CT appearance as well. Liver shows mild macronodular appearance

to its external contour. No definite focal hepatic masses are

seen on this noncontrast exam.



Several mildly prominent central mesenteric and retroperitoneal

lymph nodes are identified. Largest of these is seen interposed

between the left common iliac artery and left psoas muscle. It

measures 2.1 x 1 cm.



Osseous structures show age-related degenerative changes. No

acute bony abnormalities are seen.



CT PELVIS: Urinary bladder is unopacified and minimally

distended. Urinary bladder wall is mildly thickened in

appearance. It measures 6 mm. There is trace free fluid within

the pelvis. There is no loculated fluid collection or free air.

Osseous structures show no acute abnormalities.



IMPRESSION:

1. Mild stranding in the upper abdomen epicentered around the

pancreatic head. Findings are concerning for acute pancreatitis.

Given the presence of cholelithiasis, gallstone pancreatitis

should be considered.

2. Small amount of free fluid in the right upper abdominal

quadrant. No loculated fluid collection.

3. Moderate splenomegaly. Conceivably, this could be a sequela of

underlying cirrhosis and portal venous hypertension. However,

multiple mildly enlarged mesenteric and retroperitoneal lymph

nodes are identified within the abdomen. Underlying

lymphoproliferative process such as lymphoma should also be

considered.

4. Mild thickened appearance to the urinary bladder wall. This

may be artifact and related to incomplete distention. Cystitis

may have a similar appearance. Clinical correlation is advised.



Dictated by: 



  Dictated on workstation # [id number]

## 2022-05-15 NOTE — ED BACK PAIN
General


Chief Complaint:  Back Problems


Stated Complaint:  LOWER BACK/RT SIDE  PAIN


Source of Information:  Patient


Exam Limitations:  No Limitations





History of Present Illness


Date Seen by Provider:  May 15, 2022


Time Seen by Provider:  23:38


Initial Comments


61-year-old female with past medical history of diabetes, hypertension coming in

due to right flank pain.  Is been going on for couple days, worsening today.  

Sharp, intermittent, goes around her waist somewhat and radiation.  No nausea, 

vomiting, diarrhea, fever, chills, chest pain, shortness of breath, weakness, 

numbness, dysuria, vaginal bleeding, or any other concerns.  She is had pain 

similar to this which she says felt similar to prior kidney stone.  She took 

ibuprofen more than 8 hours ago which helped.





Allergies and Home Medications


Allergies


Coded Allergies:  


     hydrocodone (Verified  Allergy, Unknown, 1/10/19)


     hydrochlorothiazide (Verified  Adverse Reaction, Unknown, DIARRHEA, 

19)





Patient Home Medication List


Home Medication List Reviewed:  Yes


Albuterol Sulfate (Albuterol Sulfate) 2.5 Mg/3 Ml Vial.neb, 2.5 MG NEB Q4H PRN 

for SHORTNESS OF BREATH, (Reported)


   Entered as Reported by: TRAN CHOWDARY on 20 0931


Albuterol Sulfate (Proair Hfa) 1 Puff Puff, 2 PUFF IH Q4H


   Prescribed by: ANDRÉS HULL on 20 183


Aspirin (Adult Low Dose Aspirin EC) 81 Mg Tablet.dr, 81 MG PO DAILY, (Reported)


   Entered as Reported by: TRAN CHOWDARY on 20 0931


Cefdinir (Cefdinir) 300 Mg Capsule, 300 MG PO BID


   Prescribed by: SKY JONES on 1/3/20 1112


Celecoxib (Celecoxib) 200 Mg Capsule, 200 MG PO DAILY, (Reported)


   Entered as Reported by: TRAN CHOWDARY on 20 0931


Ciprofloxacin HCl (Ciprofloxacin HCl) 500 Mg Tablet, 500 MG PO BID


   Prescribed by: AZEB DONALDSON on 20 1500


Folic Acid (Folic Acid) 1 Mg Tablet, 1 MG PO DAILY, (Reported)


   Entered as Reported by: TRAN CHOWDARY on 20 0955


Gabapentin (Gabapentin) 100 Mg Capsule, 100 MG PO BID, (Reported)


   Entered as Reported by: TRAN CHOWDARY on 20


Hydrochlorothiazide (Hydrochlorothiazide) 25 Mg Tablet, 25 MG PO DAILY, 

(Reported)


   Entered as Reported by: TRAN CHOWDARY on 20 09


Levofloxacin (Levaquin) 750 Mg Tablet, 750 MG PO DAILY


   Prescribed by: ANDRÉS HULL on 20 1832


Levofloxacin (Levofloxacin) 500 Mg Tablet, 500 MG PO DAILY


   Prescribed by: ANALY LAZARO on 21


Losartan Potassium (Losartan Potassium) 50 Mg Tablet, 50 MG PO DAILY, (Reported)


   Entered as Reported by: TRAN CHOWDARY on 20


Metronidazole (Flagyl) 500 Mg Tablet, 500 MG PO TID


   Prescribed by: AZEB DONALDSON on 20 1500


Ondansetron (Ondansetron Odt) 4 Mg Tab.rapdis, 4 MG PO Q6H PRN for 

NAUSEA/VOMITING


   Prescribed by: AZEB DONALDSON on 20 1500


Potassium Chloride (Potassium Chloride) 10 Meq Tab.er.prt, 10 MEQ PO DAILY, 

(Reported)


   Entered as Reported by: TRAN CHOWDARY on 20





Review of Systems


Constitutional:  No chills, No fever


EENTM:  No blurred vision


Respiratory:  no symptoms reported


Cardiovascular:  no symptoms reported


Gastrointestinal:  no symptoms reported


Genitourinary:  No dysuria, No frequency, No hematuria, No hesitancy; other 

(Right flank pain)


Pregnant:  No


Musculoskeletal:  back pain


Skin:  no symptoms reported


Psychiatric/Neurological:  No Symptoms Reported





All Other Systems Reviewed


Negative Unless Noted:  Yes





Past Medical-Social-Family Hx


Patient Social History


Tobacco Use?:  Yes


Tobacco type used:  Cigarettes


Substance use?:  No


Alcohol Use?:  No





Immunizations Up To Date


Tetanus Booster (TDap):  More than 5yrs





Seasonal Allergies


Seasonal Allergies:  No





Past Medical History


Surgeries:  Yes (LITHOTRIPSY; FACIAL RECONSTRUCTION;  X 1-TWINS)


 Section, Renal, Tubal Ligation


Respiratory:  Yes


Pneumonia


Cardiac:  Yes (VASCULITIS OF LEGS)


Chronic Edema/Swelling, Hypertension, Peripheral Vascular


Neurological:  Yes


Neuropathy


GYN History:  Menopausal


Genitourinary:  Yes


Kidney Stones


Gastrointestinal:  Yes (HEPATITIS C--NO TREATMENT)


Hepatitis, Cirrhosis


Musculoskeletal:  Yes


Arthritis, Rheumatoid Arthritis


Endocrine:  Yes


Diabetes, Non-Insulin dep


HEENT:  No


Cancer:  No


Psychosocial:  Yes


Anxiety


Integumentary:  Yes (CHRONIC LEG EDEMA AND CELLULITIS ; VASCULITIS)


Blood Disorders:  No





Family Medical History


Heart Disease, Cancer, Diabetes





Physical Exam


Vital Signs


Capillary Refill :


Height, Weight, BMI


Height: 5'3.00"


Weight: 159lbs. 0oz. 72.554283ki; 29.00 BMI


Method:Stated


General Appearance:  No Apparent Distress, WD/WN


HEENT:  PERRL/EOMI, Normal ENT Inspection, Pharynx Normal


Neck:  Full Range of Motion, Normal Inspection, Non Tender, Supple


Cardiovascular:  Regular Rate, Rhythm, No Edema, Normal Peripheral Pulses


Respiratory:  Chest Non Tender, Lungs Clear, Normal Breath Sounds, No Accessory 

Muscle Use, No Respiratory Distress


Gastrointestinal:  Normal Bowel Sounds, Non Tender, Soft; No Distended, No 

Guarding


Back:  Normal Inspection, No Vertebral Tenderness, CVA Tenderness (R)


Extremity:  Normal Capillary Refill, Normal Inspection, Normal Range of Motion, 

Non Tender, No Calf Tenderness, No Pedal Edema


Neurologic/Psychiatric:  Alert, No Motor/Sensory Deficits, Normal Mood/Affect


Skin:  Normal Color, Warm/Dry


Lymphatic:  No Adenopathy





Progress/Results/Core Measures


Results/Orders


Lab Results





Laboratory Tests








Test


 5/15/22


00:00 Range/Units


 


 


Urine Color YELLOW   


 


Urine Clarity CLEAR   


 


Urine pH 6.0  5-9  


 


Urine Specific Gravity >=1.030  1.016-1.022  


 


Urine Protein TRACE H NEGATIVE  


 


Urine Glucose (UA) NEGATIVE  NEGATIVE  


 


Urine Ketones TRACE H NEGATIVE  


 


Urine Nitrite NEGATIVE  NEGATIVE  


 


Urine Bilirubin NEGATIVE  NEGATIVE  


 


Urine Urobilinogen 0.2  < = 1.0  MG/DL


 


Urine Leukocyte Esterase NEGATIVE  NEGATIVE  


 


Urine RBC (Auto) NEGATIVE  NEGATIVE  


 


Urine RBC 5-10 H  /HPF


 


Urine WBC 2-5   /HPF


 


Urine Squamous Epithelial


Cells 2-5 


  /HPF





 


Urine Crystals PRESENT H  /LPF


 


Urine Calcium Oxalate Crystals MODERATE H  /LPF


 


Urine Bacteria FEW H  /HPF


 


Urine Casts PRESENT   /LPF


 


Urine Hyaline Casts 2-5 H  /LPF


 


Urine Mucus MODERATE H  /LPF


 


Urine Culture Indicated NO   








My Orders





Orders - DUSTIN RAY MD


Ua Culture If Indicated (5/15/22 00:02)


Ct Abdomen/Pelvis Wo (5/15/22 00:02)


Acetaminophen  Tablet (Tylenol  Tablet) (5/15/22 00:15)


Ibuprofen  Tablet (Motrin  Tablet) (5/15/22 00:15)





Medications Given in ED





Current Medications








 Medications  Dose


 Ordered  Sig/Irving


 Route  Start Time


 Stop Time Status Last Admin


Dose Admin


 


 Acetaminophen  1,000 mg  ONCE  ONCE


 PO  5/15/22 00:15


 5/15/22 00:16 DC 5/15/22 00:12


1,000 MG


 


 Ibuprofen  400 mg  ONCE  ONCE


 PO  5/15/22 00:15


 5/15/22 00:16 DC 5/15/22 00:12


400 MG











Progress


Progress Note :  


Progress Note


61-year-old female with above history coming in due to right flank pain.  ABCs 

were intact and vitals were stable on presentation.  Physical exam with right 

flank pain but otherwise no abnormalities.  She was given ibuprofen and Tylenol 

for pain control.  CT abdomen and pelvis without contrast ordered to assess for 

kidney stone versus some other etiology.  Urinalysis also ordered.


CT with signs of cirrhosis and splenomegaly.  There is also some very mild 

inflammation in the upper abdomen around the pancreatic head and duodenum which 

could be focal pancreatitis or duodenitis per the CT reports from stat rad.  I 

did repeat abdominal exam and she has absolutely no abdominal tenderness making 

this unlikely.  On repeat she does have some suprapubic pressure that she is 

experiencing.  This mixed with the bacteria found in her urine, we will start 

her on antibiotics.  I believe she is stable for discharge with outpatient foll

ow-up.  She was sent home with strict return precautions





Departure


Impression





   Primary Impression:  


   Right flank pain


   Additional Impression:  


   UTI (urinary tract infection)


   Qualified Codes:  N30.01 - Acute cystitis with hematuria


Disposition:   HOME, SELF-CARE


Condition:  Stable





Departure-Patient Inst.


Decision time for Depature:  00:50


Referrals:  


JENARO SCHULER MD (PCP)


Primary Care Physician


Patient Instructions:  Flank Pain (DC)





Add. Discharge Instructions:  


Possible you have a brewing infection, we will start you on some antibiotics.  

Take ibuprofen and/or Tylenol as needed for pain.  If pain persist, call your 

regular doctor on Monday morning for follow-up.


Scripts


Famotidine (Pepcid) 40 Mg Tablet


40 MG PO DAILY for 14 Days, #14 TAB


   Prov: DUTSIN RAY MD         5/15/22 


Amoxicillin/Potassium Clav (Amox Tr-K Clv 875-125 mg Tab) 875 Mg-125 Mg Tablet


1 EACH PO BID for 5 Days, #10 TAB


   Prov: DUSTIN RAY MD         5/15/22


Work/School Note:  Work Release Form   Date Seen in the Emergency Department:  

May 15, 2022


   Return to Work:  May 16, 2022


   Restrictions:  No Restrictions











DUSTIN RAY MD          May 15, 2022 00:06

## 2022-05-17 ENCOUNTER — HOSPITAL ENCOUNTER (OUTPATIENT)
Dept: HOSPITAL 75 - LAB FS | Age: 62
End: 2022-05-17
Attending: INTERNAL MEDICINE
Payer: MEDICAID

## 2022-05-17 ENCOUNTER — HOSPITAL ENCOUNTER (EMERGENCY)
Dept: HOSPITAL 75 - ER | Age: 62
Discharge: LEFT BEFORE DISCHARGE | End: 2022-05-17
Payer: MEDICAID

## 2022-05-17 DIAGNOSIS — D50.0: ICD-10-CM

## 2022-05-17 DIAGNOSIS — K74.60: Primary | ICD-10-CM

## 2022-05-17 DIAGNOSIS — E61.1: Primary | ICD-10-CM

## 2022-05-17 LAB
ALBUMIN SERPL-MCNC: 3.9 GM/DL (ref 3.2–4.5)
ALP SERPL-CCNC: 103 U/L (ref 40–136)
ALT SERPL-CCNC: 16 U/L (ref 0–55)
BASOPHILS # BLD AUTO: 0 10^3/UL (ref 0–0.1)
BASOPHILS NFR BLD AUTO: 1 % (ref 0–10)
BILIRUB SERPL-MCNC: 0.3 MG/DL (ref 0.1–1)
BUN/CREAT SERPL: 13
CALCIUM SERPL-MCNC: 8.5 MG/DL (ref 8.5–10.1)
CHLORIDE SERPL-SCNC: 110 MMOL/L (ref 98–107)
CO2 SERPL-SCNC: 23 MMOL/L (ref 21–32)
CREAT SERPL-MCNC: 0.76 MG/DL (ref 0.6–1.3)
EOSINOPHIL # BLD AUTO: 0.1 10^3/UL (ref 0–0.3)
EOSINOPHIL NFR BLD AUTO: 3 % (ref 0–10)
GFR SERPLBLD BASED ON 1.73 SQ M-ARVRAT: 90 ML/MIN
GLUCOSE SERPL-MCNC: 217 MG/DL (ref 70–105)
HCT VFR BLD CALC: 21 % (ref 35–52)
HGB BLD-MCNC: 5.7 G/DL (ref 11.5–16)
INR PPP: 1.1 (ref 0.8–1.4)
LYMPHOCYTES # BLD AUTO: 0.4 10^3/UL (ref 1–4)
LYMPHOCYTES NFR BLD AUTO: 15 % (ref 12–44)
MANUAL DIFFERENTIAL PERFORMED BLD QL: NO
MCH RBC QN AUTO: 20 PG (ref 25–34)
MCHC RBC AUTO-ENTMCNC: 27 G/DL (ref 32–36)
MCV RBC AUTO: 73 FL (ref 80–99)
MONOCYTES # BLD AUTO: 0.2 10^3/UL (ref 0–1)
MONOCYTES NFR BLD AUTO: 7 % (ref 0–12)
NEUTROPHILS # BLD AUTO: 1.8 10^3/UL (ref 1.8–7.8)
NEUTROPHILS NFR BLD AUTO: 75 % (ref 42–75)
PLATELET # BLD: 108 10^3/UL (ref 130–400)
POTASSIUM SERPL-SCNC: 4 MMOL/L (ref 3.6–5)
PROT SERPL-MCNC: 6.1 GM/DL (ref 6.4–8.2)
PROTHROMBIN TIME: 14.1 SEC (ref 12.2–14.7)
SODIUM SERPL-SCNC: 143 MMOL/L (ref 135–145)
WBC # BLD AUTO: 2.4 10^3/UL (ref 4.3–11)

## 2022-05-17 PROCEDURE — 36415 COLL VENOUS BLD VENIPUNCTURE: CPT

## 2022-05-17 PROCEDURE — 82728 ASSAY OF FERRITIN: CPT

## 2022-05-17 PROCEDURE — 80053 COMPREHEN METABOLIC PANEL: CPT

## 2022-05-17 PROCEDURE — 85610 PROTHROMBIN TIME: CPT

## 2022-05-17 PROCEDURE — 83550 IRON BINDING TEST: CPT

## 2022-05-17 PROCEDURE — 85025 COMPLETE CBC W/AUTO DIFF WBC: CPT

## 2022-05-17 PROCEDURE — 83540 ASSAY OF IRON: CPT

## 2022-05-20 ENCOUNTER — HOSPITAL ENCOUNTER (OUTPATIENT)
Dept: HOSPITAL 75 - LAB FS | Age: 62
End: 2022-05-20
Attending: INTERNAL MEDICINE
Payer: MEDICARE

## 2022-05-20 DIAGNOSIS — K74.60: Primary | ICD-10-CM

## 2022-05-20 PROCEDURE — 36415 COLL VENOUS BLD VENIPUNCTURE: CPT

## 2022-05-20 PROCEDURE — 84590 ASSAY OF VITAMIN A: CPT

## 2022-06-01 ENCOUNTER — HOSPITAL ENCOUNTER (OUTPATIENT)
Dept: HOSPITAL 75 - LAB | Age: 62
End: 2022-06-01
Attending: EMERGENCY MEDICINE
Payer: MEDICAID

## 2022-06-01 ENCOUNTER — HOSPITAL ENCOUNTER (EMERGENCY)
Dept: HOSPITAL 75 - ER | Age: 62
Discharge: HOME | End: 2022-06-01
Payer: MEDICAID

## 2022-06-01 VITALS — SYSTOLIC BLOOD PRESSURE: 144 MMHG | DIASTOLIC BLOOD PRESSURE: 65 MMHG

## 2022-06-01 VITALS — DIASTOLIC BLOOD PRESSURE: 76 MMHG | SYSTOLIC BLOOD PRESSURE: 155 MMHG

## 2022-06-01 VITALS — SYSTOLIC BLOOD PRESSURE: 154 MMHG | DIASTOLIC BLOOD PRESSURE: 76 MMHG

## 2022-06-01 VITALS — HEIGHT: 62.99 IN | WEIGHT: 149.91 LBS | BODY MASS INDEX: 26.56 KG/M2

## 2022-06-01 VITALS — DIASTOLIC BLOOD PRESSURE: 71 MMHG | SYSTOLIC BLOOD PRESSURE: 151 MMHG

## 2022-06-01 DIAGNOSIS — H65.20: Primary | ICD-10-CM

## 2022-06-01 DIAGNOSIS — Z28.310: ICD-10-CM

## 2022-06-01 DIAGNOSIS — F17.210: ICD-10-CM

## 2022-06-01 DIAGNOSIS — D50.0: Primary | ICD-10-CM

## 2022-06-01 DIAGNOSIS — Z20.822: ICD-10-CM

## 2022-06-01 LAB
ALBUMIN SERPL-MCNC: 3.6 GM/DL (ref 3.2–4.5)
ALP SERPL-CCNC: 96 U/L (ref 40–136)
ALT SERPL-CCNC: 24 U/L (ref 0–55)
APTT BLD: 27 SEC (ref 24–35)
BASOPHILS # BLD AUTO: 0 10^3/UL (ref 0–0.1)
BASOPHILS NFR BLD AUTO: 0 % (ref 0–10)
BILIRUB SERPL-MCNC: 0.5 MG/DL (ref 0.1–1)
BUN/CREAT SERPL: 16
CALCIUM SERPL-MCNC: 8.8 MG/DL (ref 8.5–10.1)
CHLORIDE SERPL-SCNC: 106 MMOL/L (ref 98–107)
CO2 SERPL-SCNC: 21 MMOL/L (ref 21–32)
CREAT SERPL-MCNC: 0.8 MG/DL (ref 0.6–1.3)
EOSINOPHIL # BLD AUTO: 0.1 10^3/UL (ref 0–0.3)
EOSINOPHIL NFR BLD AUTO: 3 % (ref 0–10)
GFR SERPLBLD BASED ON 1.73 SQ M-ARVRAT: 85 ML/MIN
GLUCOSE SERPL-MCNC: 203 MG/DL (ref 70–105)
HCT VFR BLD CALC: 20 % (ref 35–52)
HGB BLD-MCNC: 5.2 G/DL (ref 11.5–16)
INR PPP: 1.2 (ref 0.8–1.4)
LYMPHOCYTES # BLD AUTO: 0.4 10^3/UL (ref 1–4)
LYMPHOCYTES NFR BLD AUTO: 14 % (ref 12–44)
MANUAL DIFFERENTIAL PERFORMED BLD QL: NO
MCH RBC QN AUTO: 19 PG (ref 25–34)
MCHC RBC AUTO-ENTMCNC: 26 G/DL (ref 32–36)
MCV RBC AUTO: 70 FL (ref 80–99)
MONOCYTES # BLD AUTO: 0.2 10^3/UL (ref 0–1)
MONOCYTES NFR BLD AUTO: 9 % (ref 0–12)
NEUTROPHILS # BLD AUTO: 2 10^3/UL (ref 1.8–7.8)
NEUTROPHILS NFR BLD AUTO: 74 % (ref 42–75)
PLATELET # BLD: 83 10^3/UL (ref 130–400)
PMV BLD AUTO: (no result) FL (ref 9–12.2)
POTASSIUM SERPL-SCNC: 3.4 MMOL/L (ref 3.6–5)
PROT SERPL-MCNC: 6.1 GM/DL (ref 6.4–8.2)
PROTHROMBIN TIME: 15.1 SEC (ref 12.2–14.7)
SODIUM SERPL-SCNC: 139 MMOL/L (ref 135–145)
WBC # BLD AUTO: 2.7 10^3/UL (ref 4.3–11)

## 2022-06-01 PROCEDURE — 86901 BLOOD TYPING SEROLOGIC RH(D): CPT

## 2022-06-01 PROCEDURE — 83550 IRON BINDING TEST: CPT

## 2022-06-01 PROCEDURE — 86900 BLOOD TYPING SEROLOGIC ABO: CPT

## 2022-06-01 PROCEDURE — 86850 RBC ANTIBODY SCREEN: CPT

## 2022-06-01 PROCEDURE — 87636 SARSCOV2 & INF A&B AMP PRB: CPT

## 2022-06-01 PROCEDURE — 36415 COLL VENOUS BLD VENIPUNCTURE: CPT

## 2022-06-01 PROCEDURE — 86920 COMPATIBILITY TEST SPIN: CPT

## 2022-06-01 PROCEDURE — 83540 ASSAY OF IRON: CPT

## 2022-06-01 PROCEDURE — 80053 COMPREHEN METABOLIC PANEL: CPT

## 2022-06-01 PROCEDURE — 85025 COMPLETE CBC W/AUTO DIFF WBC: CPT

## 2022-06-01 PROCEDURE — 82728 ASSAY OF FERRITIN: CPT

## 2022-06-01 PROCEDURE — 85730 THROMBOPLASTIN TIME PARTIAL: CPT

## 2022-06-01 PROCEDURE — 86141 C-REACTIVE PROTEIN HS: CPT

## 2022-06-01 PROCEDURE — 85610 PROTHROMBIN TIME: CPT

## 2022-06-01 NOTE — ED GENERAL
General


Chief Complaint:  COVID19 Suspect/Confirmed


Stated Complaint:  SOB, RAPID HEART RATE, WEAKNESS


Nursing Triage Note:  


C/O SOB AND WEAKNESS, [facility] TOLD HER TO COME TO THE ED FOR THESE SYMPTOMS. PATIENT 


REPORTS SHE HAD A 5.7 HGB ON THE 20TH OF MAY.


Source of Information:  Patient


Exam Limitations:  No Limitations





History of Present Illness


Date Seen by Provider:  2022


Time Seen by Provider:  18:29


Initial Comments


Patient to the ER by private conveyance chief complaint of the last week or 2 

she has been very weak ran down.  She is not having any bloody stools 

hemoptysis, chest pain or shortness of air.  She went to the ER at [place] 

and they told her she might have a UTI and put her on antibiotics but also 

discovered that her blood was low with a hemoglobin of 5.  They sent her to the 

ER here at [place] however it was too busy so she did not get seen and went 

home.  She represents today because her blood levels have not been addressed 

yet.  She does have history of kidney stones and was having some flank pain at 

the time.  Patient has a history of cirrhosis and splenomegaly and falls at . 

She had an ultrasound recently done but has not got the results.  She called  

today for results of her ultrasound and they told her to just come out to the ER

to get worked up for her hemoglobin of 5.  There are no urine micro results from

her visit to [place] from 2 weeks ago available. She is on Celebrex and takes

ibuprofen as necessary for pain.  She is not on a blood thinner.  She had 

colonoscopy at [facility] almost 10 years ago which was unremarkable.  

She has had a recent upper endoscopy at  which found esophageal varices.





Allergies and Home Medications


Allergies


Coded Allergies:  


     hydrocodone (Verified  Allergy, Unknown, 22)


     hydrochlorothiazide (Verified  Adverse Reaction, Unknown, DIARRHEA, 22)





Patient Home Medication List


Home Medication List Reviewed:  Yes


Albuterol Sulfate (Albuterol Sulfate) 2.5 Mg/3 Ml Vial.neb, 2.5 MG NEB Q4H PRN 

for SHORTNESS OF BREATH, (Reported)


   Entered as Reported by: TRAN CHOWDARY on 20 0931


Albuterol Sulfate (Proair Hfa) 1 Puff Puff, 2 PUFF IH Q4H


   Prescribed by: ANDRÉS HULL on 8/7/20 1832


Amoxicillin/Potassium Clav (Amox Tr-K Clv 875-125 mg Tab) 875 Mg-125 Mg Tablet, 

1 EACH PO BID


   Prescribed by: DUSTIN RAY on 5/15/22 005


Aspirin (Adult Low Dose Aspirin EC) 81 Mg Tablet.dr, 81 MG PO DAILY, (Reported)


   Entered as Reported by: TRAN CHOWDARY on 20 09


Cefdinir (Cefdinir) 300 Mg Capsule, 300 MG PO BID


   Prescribed by: SKY JONES on 1/3/20 111


Celecoxib (Celecoxib) 200 Mg Capsule, 200 MG PO DAILY, (Reported)


   Entered as Reported by: TRAN CHOWDARY on 20


Ciprofloxacin HCl (Ciprofloxacin HCl) 500 Mg Tablet, 500 MG PO BID


   Prescribed by: AZEB DONALDSON on 20


Famotidine (Pepcid) 40 Mg Tablet, 40 MG PO DAILY


   Prescribed by: DUSTIN RAY on 5/15/22 0052


Folic Acid (Folic Acid) 1 Mg Tablet, 1 MG PO DAILY, (Reported)


   Entered as Reported by: TRAN CHOWDARY on 20


Gabapentin (Gabapentin) 100 Mg Capsule, 100 MG PO BID, (Reported)


   Entered as Reported by: TRAN CHOWDARY on 20


Hydrochlorothiazide (Hydrochlorothiazide) 25 Mg Tablet, 25 MG PO DAILY, 

(Reported)


   Entered as Reported by: TRAN CHOWDARY on 20


Levofloxacin (Levaquin) 750 Mg Tablet, 750 MG PO DAILY


   Prescribed by: ANDRÉS HULL on 20


Levofloxacin (Levofloxacin) 500 Mg Tablet, 500 MG PO DAILY


   Prescribed by: ANALY LAZARO on 21


Losartan Potassium (Losartan Potassium) 50 Mg Tablet, 50 MG PO DAILY, (Reported)


   Entered as Reported by: TRAN CHOWDARY on 20


Metronidazole (Flagyl) 500 Mg Tablet, 500 MG PO TID


   Prescribed by: AZEB DONALDSON on 20


Ondansetron (Ondansetron Odt) 4 Mg Tab.rapdis, 4 MG PO Q6H PRN for 

NAUSEA/VOMITING


   Prescribed by: AZEB DONALDSON on 20 1500


Potassium Chloride (Potassium Chloride) 10 Meq Tab.er.prt, 10 MEQ PO DAILY, 

(Reported)


   Entered as Reported by: TRAN CHOWDARY on 20 0931





Review of Systems


Review of Systems


Constitutional:  No chills, No diaphoresis


EENTM:  No ear discharge, No hearing loss


Respiratory:  No cough, No short of breath


Cardiovascular:  No chest pain, No edema


Gastrointestinal:  No abdominal pain, No constipation, No diarrhea


Genitourinary:  No dysuria, No frequency


Musculoskeletal:  No back pain, No joint pain





All Other Systems Reviewed


Negative Unless Noted:  Yes





Past Medical-Social-Family Hx


Patient Social History


Tobacco Use?:  Yes


Tobacco type used:  Cigarettes


Use of E-Cig and/or Vaping dev:  No


Substance use?:  Yes


Substance type:  Methamphetamine


Substance frequency:  Couple times a week


Alcohol Use?:  No


Pt feels they are or have been:  No





Immunizations Up To Date


Tetanus Booster (TDap):  More than 5yrs


First/Initial COVID19 Vaccinat:  unvaccinated


Second COVID19 Vaccination Jimbo:  unvaccinated


Third COVID19 Vaccination Date:  unvaccinated





Seasonal Allergies


Seasonal Allergies:  No





Past Medical History


Surgery/Hospitalization HX:  


DM?, Renal calculi


Surgeries:  Yes (LITHOTRIPSY; FACIAL RECONSTRUCTION;  X 1-TWINS)


 Section, Renal, Tubal Ligation


Respiratory:  Yes


Pneumonia


Cardiac:  Yes (VASCULITIS OF LEGS)


Chronic Edema/Swelling, Hypertension, Peripheral Vascular


Neurological:  Yes


Neuropathy


GYN History:  Menopausal


Genitourinary:  Yes


Kidney Stones


Gastrointestinal:  Yes (HEPATITIS C--NO TREATMENT)


Hepatitis, Cirrhosis


Musculoskeletal:  Yes


Arthritis, Rheumatoid Arthritis


Endocrine:  Yes


Diabetes, Non-Insulin dep


HEENT:  No


Cancer:  No


Psychosocial:  Yes


Anxiety


Integumentary:  Yes (CHRONIC LEG EDEMA AND CELLULITIS ; VASCULITIS)


Blood Disorders:  No





Family Medical History


Heart Disease, Cancer, Diabetes





Physical Exam


Vital Signs





Vital Signs - First Documented








 22





 18:14


 


Temp 36.3


 


Pulse 109


 


Resp 20


 


B/P (MAP) 166/83 (110)


 


Pulse Ox 100


 


O2 Delivery Room Air





Capillary Refill : Less Than 3 Seconds


Height, Weight, BMI


Height: 5'3.00"


Weight: 159lbs. 0oz. 72.363627dw; 26.00 BMI


Method:Stated


General Appearance:  No Apparent Distress, WD/WN


Eyes:  Bilateral Eye Normal Inspection, Bilateral Eye PERRL, Bilateral Eye EOMI


HEENT:  PERRL/EOMI, Pharynx Normal, Moist Mucous Membranes


Neck:  Full Range of Motion, Normal Inspection


Respiratory:  Lungs Clear, Normal Breath Sounds, No Accessory Muscle Use, No 

Respiratory Distress


Cardiovascular:  Regular Rate, Rhythm, No Edema, Normal Peripheral Pulses


Gastrointestinal:  Normal Bowel Sounds, Non Tender, Soft


Extremity:  Normal Capillary Refill, Normal Inspection, No Pedal Edema


Neurologic/Psychiatric:  Alert, Oriented x3, No Motor/Sensory Deficits


Skin:  Warm/Dry, Pallor





Progress/Results/Core Measures


Suspected Sepsis


SIRS


Temperature: 


Pulse: 109 


Respiratory Rate: 20


 


Laboratory Tests


22 18:40: White Blood Count 2.7L


Blood Pressure 166 /83 


Mean: 110


 











Laboratory Tests


22 18:40: 


Creatinine 0.80, INR Comment 1.2, Platelet Count 83L, Total Bilirubin 0.5





Results/Orders


Lab Results





Laboratory Tests








Test


 22


18:12 22


18:40 Range/Units


 


 


Influenza Type A (RT-PCR) Not Detected   Not Detecte  


 


Influenza Type B (RT-PCR) Not Detected   Not Detecte  


 


SARS-CoV-2 RNA (RT-PCR) Not Detected   Not Detecte  


 


White Blood Count


 


 2.7 L


 4.3-11.0


10^3/uL


 


Red Blood Count


 


 2.80 L


 3.80-5.11


10^6/uL


 


Hemoglobin  5.2 *L 11.5-16.0  g/dL


 


Hematocrit  20 *L 35-52  %


 


Mean Corpuscular Volume  70 L 80-99  fL


 


Mean Corpuscular Hemoglobin  19 L 25-34  pg


 


Mean Corpuscular Hemoglobin


Concent 


 26 L


 32-36  g/dL





 


Red Cell Distribution Width  17.7 H 10.0-14.5  %


 


Platelet Count


 


 83 L


 130-400


10^3/uL


 


Mean Platelet Volume    9.0-12.2  fL


 


Immature Granulocyte % (Auto)  0   %


 


Neutrophils (%) (Auto)  74  42-75  %


 


Lymphocytes (%) (Auto)  14  12-44  %


 


Monocytes (%) (Auto)  9  0-12  %


 


Eosinophils (%) (Auto)  3  0-10  %


 


Basophils (%) (Auto)  0  0-10  %


 


Neutrophils # (Auto)


 


 2.0 


 1.8-7.8


10^3/uL


 


Lymphocytes # (Auto)


 


 0.4 L


 1.0-4.0


10^3/uL


 


Monocytes # (Auto)


 


 0.2 


 0.0-1.0


10^3/uL


 


Eosinophils # (Auto)


 


 0.1 


 0.0-0.3


10^3/uL


 


Basophils # (Auto)


 


 0.0 


 0.0-0.1


10^3/uL


 


Immature Granulocyte # (Auto)


 


 0.0 


 0.0-0.1


10^3/uL


 


Percent Immature Platelet


Fraction 


 4.3 


 0.0-7.6  %





 


Prothrombin Time  15.1 H 12.2-14.7  SEC


 


INR Comment  1.2  0.8-1.4  


 


Activated Partial


Thromboplast Time 


 27 


 24-35  SEC





 


Sodium Level  139  135-145  MMOL/L


 


Potassium Level  3.4 L 3.6-5.0  MMOL/L


 


Chloride Level  106    MMOL/L


 


Carbon Dioxide Level  21  21-32  MMOL/L


 


Anion Gap  12  5-14  MMOL/L


 


Blood Urea Nitrogen  13  7-18  MG/DL


 


Creatinine


 


 0.80 


 0.60-1.30


MG/DL


 


Estimat Glomerular Filtration


Rate 


 85 


  





 


BUN/Creatinine Ratio  16   


 


Glucose Level  203 H   MG/DL


 


Calcium Level  8.8  8.5-10.1  MG/DL


 


Corrected Calcium  9.1  8.5-10.1  MG/DL


 


Total Bilirubin  0.5  0.1-1.0  MG/DL


 


Aspartate Amino Transf


(AST/SGOT) 


 24 


 5-34  U/L





 


Alanine Aminotransferase


(ALT/SGPT) 


 24 


 0-55  U/L





 


Alkaline Phosphatase  96    U/L


 


C-Reactive Protein High


Sensitivity 


 0.18 


 0.00-0.50


MG/DL


 


Total Protein  6.1 L 6.4-8.2  GM/DL


 


Albumin  3.6  3.2-4.5  GM/DL








My Orders





Orders - ARIANNE WEBEBR 19 Inhouse Test (22 18:17)


Influenza A And B By Pcr (22 18:17)


Cbc With Automated Diff (22 18:48)


Comprehensive Metabolic Panel (22 18:48)


Hs C Reactive Protein (22 18:48)


Type And Screen (22 18:48)


Protime With Inr (22 18:56)


Partial Thromboplastin Time (22 18:56)





Vital Signs/I&O











 22





 18:14


 


Temp 36.3


 


Pulse 109


 


Resp 20


 


B/P (MAP) 166/83 (110)


 


Pulse Ox 100


 


O2 Delivery Room Air





Capillary Refill : Less Than 3 Seconds








Blood Pressure Mean:                    110








Progress Note :  


   Time:  18:55


Progress Note


We will initiate an IV and obtain some labs.  Illness 5.7 2 weeks ago.  Type and

screen.  She already has an ongoing work-up for her cirrhosis at .





Departure


Impression





   Primary Impression:  


   Anemia


   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 

   (chronic)


Disposition:   HOME, SELF-CARE


Condition:  Stable





Departure-Patient Inst.


Decision time for Depature:  19:58


Referrals:  


JUAN NOVAK JULIE A MD (PCP/Family)


Primary Care Physician


Patient Instructions:  Anemia, Possibly From Low Iron, Adult ED





Add. Discharge Instructions:  


We have performed iron studies which will be back in a few days.  You can fol

low-up with your primary care doctor for these results.


Call Dr. Novak, general surgery and request a follow-up appointment sometime 

this week or next to discuss possible colonoscopy.


Return to the ER promptly for chest pain, shortness of air or other worrisome 

symptoms.


All discharge instructions reviewed with patient and/or family. Voiced 

understanding.





Copy


Copies To 1:   JUAN NOVAK TITUS J                  2022 18:56

## 2022-06-02 VITALS — SYSTOLIC BLOOD PRESSURE: 144 MMHG | DIASTOLIC BLOOD PRESSURE: 65 MMHG

## 2022-06-13 ENCOUNTER — HOSPITAL ENCOUNTER (OUTPATIENT)
Dept: HOSPITAL 75 - SDC | Age: 62
End: 2022-06-13
Attending: INTERNAL MEDICINE
Payer: MEDICARE

## 2022-06-13 VITALS — HEIGHT: 62.99 IN | BODY MASS INDEX: 26.6 KG/M2 | WEIGHT: 150.13 LBS

## 2022-06-13 VITALS — DIASTOLIC BLOOD PRESSURE: 70 MMHG | SYSTOLIC BLOOD PRESSURE: 141 MMHG

## 2022-06-13 DIAGNOSIS — D50.0: Primary | ICD-10-CM

## 2022-06-13 PROCEDURE — 96365 THER/PROPH/DIAG IV INF INIT: CPT

## 2023-02-04 ENCOUNTER — HOSPITAL ENCOUNTER (OUTPATIENT)
Dept: HOSPITAL 75 - RAD FS | Age: 63
End: 2023-02-04
Payer: MEDICARE

## 2023-02-04 DIAGNOSIS — J44.1: Primary | ICD-10-CM

## 2023-02-04 PROCEDURE — 71046 X-RAY EXAM CHEST 2 VIEWS: CPT

## 2023-02-04 NOTE — DIAGNOSTIC IMAGING REPORT
INDICATION: Cough and pneumonia.



TIME OF EXAM: 1:37 PM.



COMPARISON: Correlation is made with prior chest from 01/08/2021.

 

Heart size is normal. There is some linear atelectasis or

scarring in the left base. Otherwise, lungs are clear. No

effusion or pneumothorax is detected.



IMPRESSION: Left basilar atelectasis or scarring. The study is

otherwise unremarkable. 



Dictated by: 



  Dictated on workstation # [id number]

## 2023-03-14 VITALS — DIASTOLIC BLOOD PRESSURE: 84 MMHG | SYSTOLIC BLOOD PRESSURE: 168 MMHG

## 2023-03-20 ENCOUNTER — HOSPITAL ENCOUNTER (OUTPATIENT)
Dept: HOSPITAL 75 - SDC | Age: 63
LOS: 11 days | Discharge: HOME | End: 2023-03-31
Attending: INTERNAL MEDICINE
Payer: MEDICARE

## 2023-03-20 VITALS — HEIGHT: 62.99 IN | WEIGHT: 150.36 LBS | BODY MASS INDEX: 26.64 KG/M2

## 2023-03-20 VITALS — SYSTOLIC BLOOD PRESSURE: 156 MMHG | DIASTOLIC BLOOD PRESSURE: 74 MMHG

## 2023-03-20 DIAGNOSIS — D50.0: Primary | ICD-10-CM

## 2023-06-21 NOTE — DIAGNOSTIC IMAGING REPORT
PATIENT HISTORY: Swelling in the lower extremities. 



TECHNIQUE: Two views of the chest.



COMPARISON: None.



FINDINGS: Lung volumes are normal. There is linear opacity in the

left lung base, likely atelectasis or scarring. No focal airspace

consolidation is seen. There is no pleural effusion or

pneumothorax. The cardiac silhouette is normal in size. No acute

osseous abnormality is seen.



IMPRESSION: No acute pulmonary abnormality is seen. 



Dictated by: 



  Dictated on workstation # [id number] Glycopyrrolate Counseling:  I discussed with the patient the risks of glycopyrrolate including but not limited to skin rash, drowsiness, dry mouth, difficulty urinating, and blurred vision.

## 2023-07-06 ENCOUNTER — HOSPITAL ENCOUNTER (OUTPATIENT)
Dept: HOSPITAL 75 - SDC | Age: 63
LOS: 25 days | Discharge: HOME | End: 2023-07-31
Attending: INTERNAL MEDICINE
Payer: MEDICARE

## 2023-07-06 VITALS — WEIGHT: 157.41 LBS | HEIGHT: 62.99 IN | BODY MASS INDEX: 27.89 KG/M2

## 2023-07-06 VITALS — SYSTOLIC BLOOD PRESSURE: 169 MMHG | DIASTOLIC BLOOD PRESSURE: 80 MMHG

## 2023-07-06 DIAGNOSIS — D50.9: Primary | ICD-10-CM

## 2023-07-06 PROCEDURE — 96365 THER/PROPH/DIAG IV INF INIT: CPT

## 2023-08-18 ENCOUNTER — HOSPITAL ENCOUNTER (EMERGENCY)
Dept: HOSPITAL 75 - ER | Age: 63
Discharge: HOME | End: 2023-08-18
Payer: MEDICARE

## 2023-08-18 VITALS — SYSTOLIC BLOOD PRESSURE: 159 MMHG | DIASTOLIC BLOOD PRESSURE: 91 MMHG

## 2023-08-18 VITALS — BODY MASS INDEX: 26.56 KG/M2 | WEIGHT: 149.91 LBS | HEIGHT: 62.99 IN

## 2023-08-18 DIAGNOSIS — L03.116: Primary | ICD-10-CM

## 2023-08-18 DIAGNOSIS — Z28.310: ICD-10-CM

## 2023-08-18 DIAGNOSIS — E11.65: ICD-10-CM

## 2023-08-18 DIAGNOSIS — Z88.1: ICD-10-CM

## 2023-08-18 DIAGNOSIS — D61.818: ICD-10-CM

## 2023-08-18 DIAGNOSIS — Z20.822: ICD-10-CM

## 2023-08-18 DIAGNOSIS — N39.0: ICD-10-CM

## 2023-08-18 DIAGNOSIS — L03.115: ICD-10-CM

## 2023-08-18 LAB
ALBUMIN SERPL-MCNC: 3.7 GM/DL (ref 3.2–4.5)
ALP SERPL-CCNC: 104 U/L (ref 40–136)
ALT SERPL-CCNC: 21 U/L (ref 0–55)
APTT PPP: YELLOW S
BACTERIA #/AREA URNS HPF: (no result) /HPF
BASOPHILS # BLD AUTO: 0 10^3/UL (ref 0–0.1)
BASOPHILS NFR BLD AUTO: 0 % (ref 0–10)
BILIRUB SERPL-MCNC: 0.6 MG/DL (ref 0.1–1)
BILIRUB UR QL STRIP: NEGATIVE
BLD SMEAR INTERP: YES
BUN/CREAT SERPL: 8
CALCIUM SERPL-MCNC: 8.4 MG/DL (ref 8.5–10.1)
CHLORIDE SERPL-SCNC: 105 MMOL/L (ref 98–107)
CO2 SERPL-SCNC: 23 MMOL/L (ref 21–32)
CREAT SERPL-MCNC: 0.91 MG/DL (ref 0.6–1.3)
EOSINOPHIL # BLD AUTO: 0.1 10^3/UL (ref 0–0.3)
EOSINOPHIL NFR BLD AUTO: 4 % (ref 0–10)
FIBRINOGEN PPP-MCNC: CLEAR MG/DL
GFR SERPLBLD BASED ON 1.73 SQ M-ARVRAT: 72 ML/MIN
GLUCOSE SERPL-MCNC: 367 MG/DL (ref 70–105)
GLUCOSE UR STRIP-MCNC: (no result) MG/DL
HCT VFR BLD CALC: 34 % (ref 35–52)
HGB BLD-MCNC: 10 G/DL (ref 11.5–16)
KETONES UR QL STRIP: NEGATIVE
LEUKOCYTE ESTERASE UR QL STRIP: NEGATIVE
LYMPHOCYTES # BLD AUTO: 0.3 10^3/UL (ref 1–4)
LYMPHOCYTES NFR BLD AUTO: 8 % (ref 12–44)
MAGNESIUM SERPL-MCNC: 1.5 MG/DL (ref 1.6–2.4)
MANUAL DIFFERENTIAL PERFORMED BLD QL: NO
MCH RBC QN AUTO: 25 PG (ref 25–34)
MCHC RBC AUTO-ENTMCNC: 30 G/DL (ref 32–36)
MCV RBC AUTO: 83 FL (ref 80–99)
MONOCYTES # BLD AUTO: 0.3 10^3/UL (ref 0–1)
MONOCYTES NFR BLD AUTO: 8 % (ref 0–12)
NEUTROPHILS # BLD AUTO: 2.8 10^3/UL (ref 1.8–7.8)
NEUTROPHILS NFR BLD AUTO: 80 % (ref 42–75)
NITRITE UR QL STRIP: POSITIVE
PH UR STRIP: 6 [PH] (ref 5–9)
PLATELET # BLD: 65 10^3/UL (ref 130–400)
POTASSIUM SERPL-SCNC: 3.6 MMOL/L (ref 3.6–5)
PROT SERPL-MCNC: 6.3 GM/DL (ref 6.4–8.2)
PROT UR QL STRIP: (no result)
RBC #/AREA URNS HPF: (no result) /HPF
SODIUM SERPL-SCNC: 137 MMOL/L (ref 135–145)
SP GR UR STRIP: >=1.03 (ref 1.02–1.02)
SQUAMOUS #/AREA URNS HPF: (no result) /HPF
WBC # BLD AUTO: 3.5 10^3/UL (ref 4.3–11)
WBC #/AREA URNS HPF: (no result) /HPF

## 2023-08-18 PROCEDURE — 80053 COMPREHEN METABOLIC PANEL: CPT

## 2023-08-18 PROCEDURE — 83735 ASSAY OF MAGNESIUM: CPT

## 2023-08-18 PROCEDURE — 87088 URINE BACTERIA CULTURE: CPT

## 2023-08-18 PROCEDURE — 82947 ASSAY GLUCOSE BLOOD QUANT: CPT

## 2023-08-18 PROCEDURE — 83605 ASSAY OF LACTIC ACID: CPT

## 2023-08-18 PROCEDURE — 87040 BLOOD CULTURE FOR BACTERIA: CPT

## 2023-08-18 PROCEDURE — 85025 COMPLETE CBC W/AUTO DIFF WBC: CPT

## 2023-08-18 PROCEDURE — 36415 COLL VENOUS BLD VENIPUNCTURE: CPT

## 2023-08-18 PROCEDURE — 81000 URINALYSIS NONAUTO W/SCOPE: CPT

## 2023-08-18 PROCEDURE — 87636 SARSCOV2 & INF A&B AMP PRB: CPT

## 2023-08-18 NOTE — ED GENERAL
General


Chief Complaint:  General Problems/Pain


Stated Complaint:  BLOOD SUGAR 400,STAGE 4 CIRRHOSIS OF LIVER,SOB


Nursing Triage Note:  


elevated glucose, low iron, headache, nausea, red legs x1 day.





History of Present Illness


Date Seen by Provider:  Aug 18, 2023


Time Seen by Provider:  05:57


Initial Comments


63-year-old female presents with headache, bilateral red legs x1 day.  Elevated 

blood sugar.  Patient has been on insulin and metformin in the past but reports 

that her primary care provider took her off of it.  Patient has not checked her 

blood sugars for months.  She has not follow-up with her primary care provider 

for couple months.  Reports that she just has some generalized malaise for the 

last couple days.  She denies a history of headaches.  She does report history 

of stage IV liver cirrhosis.  She is also want her "iron checked" because she 

has a history of low iron.  Patient reports her blood sugar at home was 404.  

They do not know what it typically runs because they have not checked in months.





Allergies and Home Medications


Allergies


Coded Allergies:  


     clindamycin (Verified  Allergy, Unknown, 22)


     hydrocodone (Verified  Allergy, Unknown, 22)


     hydrochlorothiazide (Verified  Adverse Reaction, Unknown, DIARRHEA, 22)





Patient Home Medication List


Home Medication List Reviewed:  Yes


Albuterol Sulfate (Albuterol Sulfate) 2.5 Mg/3 Ml Vial.neb, 2.5 MG NEB Q4H PRN 

for SHORTNESS OF BREATH, (Reported)


   Entered as Reported by: TRAN CHOWDARY on 20


Albuterol Sulfate (Ventolin Hfa) 1 Puff Puff, 2 PUFF IH Q4H


   Prescribed by: ANDRÉS HULL on 20 183


Aspirin (Adult Low Dose Aspirin EC) 81 Mg Tablet.dr, 81 MG PO DAILY, (Reported)


   Entered as Reported by: TRAN CHOWDARY on 20


Celecoxib (Celecoxib) 200 Mg Capsule, 200 MG PO DAILY, (Reported)


   Entered as Reported by: TRAN CHOWDARY on 20


Cephalexin (Cephalexin) 500 Mg Tablet, 500 MG PO QID


   Prescribed by: KVNG DAS on 23 0838


Famotidine (Pepcid) 40 Mg Tablet, 40 MG PO DAILY


   Prescribed by: DUSTIN RAY on 5/15/22 0052


Folic Acid (Folic Acid) 1 Mg Tablet, 1 MG PO DAILY, (Reported)


   Entered as Reported by: TRAN CHOWDARY on 20


Gabapentin (Gabapentin) 100 Mg Capsule, 100 MG PO BID, (Reported)


   Entered as Reported by: TRAN CHOWDARY on 20


Hydrochlorothiazide (Hydrochlorothiazide) 25 Mg Tablet, 25 MG PO DAILY, 

(Reported)


   Entered as Reported by: TRAN CHOWDARY on 20


Losartan Potassium (Losartan Potassium) 50 Mg Tablet, 50 MG PO DAILY, (Reported)


   Entered as Reported by: TRAN CHOWDARY on 20


Potassium Chloride (Potassium Chloride) 10 Meq Tab.er.prt, 10 MEQ PO DAILY, 

(Reported)


   Entered as Reported by: TRAN CHOWDARY on 20





Review of Systems


Review of Systems


Constitutional:  malaise


EENTM:  nose congestion


Respiratory:  No cough


Cardiovascular:  No chest pain


Gastrointestinal:  No abdominal pain, No vomiting


Skin:  see HPI, change in color


Psychiatric/Neurological:  No Symptoms Reported





Past Medical-Social-Family Hx


Patient Social History


Tobacco Use?:  Yes


Substance use?:  Yes


Substance type:  Methamphetamine


Alcohol Use?:  Yes


Pt feels they are or have been:  No





Immunizations Up To Date


Tetanus Booster (TDap):  More than 5yrs


First/Initial COVID19 Vaccinat:  unvaccinated


Second COVID19 Vaccination Jimbo:  unvaccinated


Third COVID19 Vaccination Date:  unvaccinated





Seasonal Allergies


Seasonal Allergies:  No





Past Medical History


Surgery/Hospitalization HX:  


DM?, Renal calculi


Surgeries:  Yes (LITHOTRIPSY; FACIAL RECONSTRUCTION;  X 1-TWINS)


 Section, Renal, Tubal Ligation


Respiratory:  Yes


Pneumonia


Cardiac:  Yes (VASCULITIS OF LEGS)


Chronic Edema/Swelling, Hypertension, Peripheral Vascular


Neurological:  Yes


Neuropathy


GYN History:  Menopausal


Genitourinary:  Yes


Kidney Stones


Gastrointestinal:  Yes (HEPATITIS C--NO TREATMENT)


Hepatitis, Cirrhosis


Musculoskeletal:  Yes


Arthritis, Rheumatoid Arthritis


Endocrine:  Yes


Diabetes, Non-Insulin dep


HEENT:  No


Cancer:  No


Psychosocial:  Yes


Anxiety


Integumentary:  Yes (CHRONIC LEG EDEMA AND CELLULITIS ; VASCULITIS)


Blood Disorders:  No





Family Medical History


Heart Disease, Cancer, Diabetes





Physical Exam


Vital Signs





Vital Signs - First Documented








 23





 05:51


 


Temp 37.7


 


Pulse 93


 


Resp 18


 


B/P (MAP) 158/71 (100)


 


Pulse Ox 98


 


O2 Delivery Room Air





Capillary Refill : Less Than 3 Seconds


Height, Weight, BMI


Height: 5'3.00"


Weight: 159lbs. 0oz. 72.276811br; 26.00 BMI


Method:Stated


General Appearance:  No Apparent Distress, WD/WN


Respiratory:  Lungs Clear, Normal Breath Sounds


Cardiovascular:  Regular Rate, Rhythm, No Edema


Gastrointestinal:  Non Tender, Soft; No Tenderness


Extremity:  Swelling (mildd bilatral )


Neurologic/Psychiatric:  Alert, Oriented x3


Skin:  Other (bilateral lower ext chronic changes, with new warmth/erythema)





Focused Exam


Lactate Level


23 06:14: Lactic Acid Level 2.76*H


23 07:50: Lactic Acid Level 1.65





Lactic Acid Level





Laboratory Tests








Test


 23


06:14 23


07:50


 


Lactic Acid Level


 2.76 MMOL/L


(0.50-2.00)  *H 1.65 MMOL/L


(0.50-2.00)











Progress/Results/Core Measures


Suspected Sepsis


SIRS


Temperature: 


Pulse: 93 


Respiratory Rate: 18


 


Laboratory Tests


23 06:14: White Blood Count 3.5L


Blood Pressure 158 /71 


Mean: 100


 





23 06:14: Lactic Acid Level 2.76*H


23 07:50: Lactic Acid Level 1.65


Laboratory Tests


23 06:14: 


Creatinine 0.91, Platelet Count 65L, Total Bilirubin 0.6








Results/Orders


Lab Results





Laboratory Tests








Test


 23


05:52 23


06:14 23


06:20 23


07:50 Range/Units


 


 


Glucometer 320 H      MG/DL


 


White Blood Count


 


 3.5 L


 


 


 4.3-11.0


10^3/uL


 


Red Blood Count


 


 4.04 


 


 


 3.80-5.11


10^6/uL


 


Hemoglobin  10.0 L   11.5-16.0  g/dL


 


Hematocrit  34 L   35-52  %


 


Mean Corpuscular Volume  83    80-99  fL


 


Mean Corpuscular Hemoglobin  25    25-34  pg


 


Mean Corpuscular Hemoglobin


Concent 


 30 L


 


 


 32-36  g/dL





 


Red Cell Distribution Width  20.8 H   10.0-14.5  %


 


Platelet Count


 


 65 L


 


 


 130-400


10^3/uL


 


Mean Platelet Volume      9.0-12.2  fL


 


Immature Granulocyte % (Auto)  0     %


 


Neutrophils (%) (Auto)  80 H   42-75  %


 


Lymphocytes (%) (Auto)  8 L   12-44  %


 


Monocytes (%) (Auto)  8    0-12  %


 


Eosinophils (%) (Auto)  4    0-10  %


 


Basophils (%) (Auto)  0    0-10  %


 


Neutrophils # (Auto)


 


 2.8 


 


 


 1.8-7.8


10^3/uL


 


Lymphocytes # (Auto)


 


 0.3 L


 


 


 1.0-4.0


10^3/uL


 


Monocytes # (Auto)


 


 0.3 


 


 


 0.0-1.0


10^3/uL


 


Eosinophils # (Auto)


 


 0.1 


 


 


 0.0-0.3


10^3/uL


 


Basophils # (Auto)


 


 0.0 


 


 


 0.0-0.1


10^3/uL


 


Immature Granulocyte # (Auto)


 


 0.0 


 


 


 0.0-0.1


10^3/uL


 


Percent Immature Platelet


Fraction 


 3.7 


 


 


 0.0-7.6  %





 


Sodium Level  137    135-145  MMOL/L


 


Potassium Level  3.6    3.6-5.0  MMOL/L


 


Chloride Level  105      MMOL/L


 


Carbon Dioxide Level  23    21-32  MMOL/L


 


Anion Gap  9    5-14  MMOL/L


 


Blood Urea Nitrogen  7    7-18  MG/DL


 


Creatinine


 


 0.91 


 


 


 0.60-1.30


MG/DL


 


Estimat Glomerular Filtration


Rate 


 72 


 


 


  





 


BUN/Creatinine Ratio  8     


 


Glucose Level  367 H     MG/DL


 


Lactic Acid Level


 


 2.76 *H


 


 1.65 


 0.50-2.00


MMOL/L


 


Calcium Level  8.4 L   8.5-10.1  MG/DL


 


Corrected Calcium  8.6    8.5-10.1  MG/DL


 


Magnesium Level  1.5 L   1.6-2.4  MG/DL


 


Total Bilirubin  0.6    0.1-1.0  MG/DL


 


Aspartate Amino Transf


(AST/SGOT) 


 24 


 


 


 5-34  U/L





 


Alanine Aminotransferase


(ALT/SGPT) 


 21 


 


 


 0-55  U/L





 


Alkaline Phosphatase  104      U/L


 


Total Protein  6.3 L   6.4-8.2  GM/DL


 


Albumin  3.7    3.2-4.5  GM/DL


 


Smear Scan  YES     


 


Influenza Type A (RT-PCR)   Not Detected   Not Detecte  


 


Influenza Type B (RT-PCR)   Not Detected   Not Detecte  


 


SARS-CoV-2 RNA (RT-PCR)   Not Detected   Not Detecte  


 


Test


 23


08:35 


 


 


 Range/Units


 


 


Urine Color YELLOW      


 


Urine Clarity CLEAR      


 


Urine pH 6.0     5-9  


 


Urine Specific Gravity >=1.030     1.016-1.022  


 


Urine Protein 1+ H    NEGATIVE  


 


Urine Glucose (UA) 3+ H    NEGATIVE  


 


Urine Ketones NEGATIVE     NEGATIVE  


 


Urine Nitrite POSITIVE H    NEGATIVE  


 


Urine Bilirubin NEGATIVE     NEGATIVE  


 


Urine Urobilinogen 0.2     < = 1.0  MG/DL


 


Urine Leukocyte Esterase NEGATIVE     NEGATIVE  


 


Urine RBC (Auto) TRACE H    NEGATIVE  


 


Urine RBC 0-2      /HPF


 


Urine WBC 5-10 H     /HPF


 


Urine Squamous Epithelial


Cells 0-2 


 


 


 


  /HPF





 


Urine Crystals NONE      /LPF


 


Urine Bacteria FEW H     /HPF


 


Urine Casts NONE      /LPF


 


Urine Mucus SMALL H     /LPF


 


Urine Culture Indicated YES      








My Orders





Orders - DAS,KVNG L DO


Cbc With Automated Diff (23 06:03)


Comprehensive Metabolic Panel (23 06:03)


Lactic Acid Analyzer (23 06:03)


Magnesium (23 06:03)


Ua Culture If Indicated (23 06:03)


Blood Culture (23 06:03)


Influenza A And B By Pcr (23 06:03)


Covid 19 Inhouse Test (23 06:03)


Ceftriaxone  Iv/Im (Ceftriaxone  Iv/Im) (23 06:03)


Ns Iv 1000 Ml (Sodium Chloride 0.9%) (23 06:03)


Urine Culture (23 08:35)





Vital Signs/I&O











 23





 05:51


 


Temp 37.7


 


Pulse 93


 


Resp 18


 


B/P (MAP) 158/71 (100)


 


Pulse Ox 98


 


O2 Delivery Room Air





Capillary Refill : Less Than 3 Seconds








Blood Pressure Mean:                    100











Point of Care Testing


Finger Stick Blood Glucose:  320


Blood Glucose Action Taken:  RN and physician notified.


Progress Note :  


Progress Note


Diagnostic studies were ordered reviewed and interpreted by me.  She did have a 

slight initial elevated lactic acid however plan IV fluids and treatment it 

returned to a normal level.  Patient has mild cellulitis of her bilateral lower 

extremities that is superficial along with a likely urinary tract infection.  

Patient was treated with 1 g IV Rocephin while in the ER.  Patient be started on

Keflex which should treat both her cellulitis and her urinary tract infection.  

Patient likely with mild diabetes.  Recommend she follow-up with her primary 

care provider this week for further evaluation recheck of her symptoms and 

discussion of further treatment and evaluation for diabetes.  This time there is

no indication for hospitalization.  Patient does not have any physical or 

significant findings for early sepsis.  Patient was stable and discharged home.





Departure


Impression





   Primary Impression:  


   Cellulitis of both lower extremities


   Additional Impressions:  


   Hyperglycemia, unspecified


   Pancytopenia


   Urinary tract infection


   Qualified Codes:  N30.00 - Acute cystitis without hematuria


Disposition:   HOME, SELF-CARE


Condition:  Stable





Departure-Patient Inst.


Referrals:  


JENARO SCHULER MD (PCP/Family)


Primary Care Physician


Patient Instructions:  Cellulitis (Skin Infection), Adult (DC), High Blood 

Sugar, Adult ED, Urinary Tract Infection, Adult ED





Add. Discharge Instructions:  


Follow-up with Dr. Schuler by the end of the week for recheck of all your 

symptoms





All discharge instructions reviewed with patient and/or family. Voiced unde

rstanding.


Scripts


Cephalexin (Cephalexin) 500 Mg Tablet


500 MG PO QID for 10 Days, #40 TAB


   Prov: KVNG DAS DO         23











KVNG DAS DO               Aug 18, 2023 05:57

## 2023-11-08 ENCOUNTER — HOSPITAL ENCOUNTER (OUTPATIENT)
Dept: HOSPITAL 75 - SDC | Age: 63
Discharge: HOME | End: 2023-11-08
Attending: INTERNAL MEDICINE
Payer: MEDICARE

## 2023-11-08 VITALS — WEIGHT: 150.36 LBS | BODY MASS INDEX: 26.64 KG/M2 | HEIGHT: 62.99 IN

## 2023-11-08 VITALS — DIASTOLIC BLOOD PRESSURE: 82 MMHG | SYSTOLIC BLOOD PRESSURE: 172 MMHG

## 2023-11-08 DIAGNOSIS — D50.9: Primary | ICD-10-CM

## 2023-11-08 PROCEDURE — 96365 THER/PROPH/DIAG IV INF INIT: CPT
